# Patient Record
Sex: MALE | Race: WHITE | Employment: OTHER | URBAN - METROPOLITAN AREA
[De-identification: names, ages, dates, MRNs, and addresses within clinical notes are randomized per-mention and may not be internally consistent; named-entity substitution may affect disease eponyms.]

---

## 2017-01-27 PROBLEM — Z01.810 PREOPERATIVE CARDIOVASCULAR EXAMINATION: Status: ACTIVE | Noted: 2017-01-27

## 2019-01-01 ENCOUNTER — TELEPHONE (OUTPATIENT)
Dept: CARDIAC CATH/INVASIVE PROCEDURES | Age: 79
End: 2019-01-01

## 2019-01-01 ENCOUNTER — HOSPITAL ENCOUNTER (INPATIENT)
Age: 79
LOS: 1 days | Discharge: HOME HEALTH | DRG: 638 | End: 2019-12-23
Attending: STUDENT IN AN ORGANIZED HEALTH CARE EDUCATION/TRAINING PROGRAM | Admitting: INTERNAL MEDICINE
Payer: MEDICARE

## 2019-01-01 ENCOUNTER — APPOINTMENT (OUTPATIENT)
Dept: GENERAL RADIOLOGY | Age: 79
DRG: 638 | End: 2019-01-01
Attending: STUDENT IN AN ORGANIZED HEALTH CARE EDUCATION/TRAINING PROGRAM
Payer: MEDICARE

## 2019-01-01 VITALS
WEIGHT: 315 LBS | HEIGHT: 75 IN | BODY MASS INDEX: 39.17 KG/M2 | HEART RATE: 76 BPM | SYSTOLIC BLOOD PRESSURE: 186 MMHG | DIASTOLIC BLOOD PRESSURE: 80 MMHG | TEMPERATURE: 98.1 F | RESPIRATION RATE: 18 BRPM | OXYGEN SATURATION: 99 %

## 2019-01-01 DIAGNOSIS — E16.2 HYPOGLYCEMIA: Primary | ICD-10-CM

## 2019-01-01 DIAGNOSIS — N39.0 URINARY TRACT INFECTION WITHOUT HEMATURIA, SITE UNSPECIFIED: ICD-10-CM

## 2019-01-01 LAB
ACC. NO. FROM MICRO ORDER, ACCP: ABNORMAL
ALBUMIN SERPL-MCNC: 2.5 G/DL (ref 3.2–4.6)
ALBUMIN/GLOB SERPL: 0.6 {RATIO} (ref 1.2–3.5)
ALP SERPL-CCNC: 50 U/L (ref 50–136)
ALT SERPL-CCNC: 10 U/L (ref 12–65)
ANION GAP SERPL CALC-SCNC: 5 MMOL/L (ref 7–16)
ANION GAP SERPL CALC-SCNC: 6 MMOL/L (ref 7–16)
APPEARANCE UR: CLEAR
AST SERPL-CCNC: 14 U/L (ref 15–37)
ATRIAL RATE: 77 BPM
BACTERIA SPEC CULT: ABNORMAL
BACTERIA SPEC CULT: NORMAL
BACTERIA SPEC CULT: NORMAL
BACTERIA URNS QL MICRO: 0 /HPF
BASOPHILS # BLD: 0 K/UL (ref 0–0.2)
BASOPHILS # BLD: 0 K/UL (ref 0–0.2)
BASOPHILS NFR BLD: 0 % (ref 0–2)
BASOPHILS NFR BLD: 0 % (ref 0–2)
BILIRUB SERPL-MCNC: 0.3 MG/DL (ref 0.2–1.1)
BILIRUB UR QL: NEGATIVE
BNP SERPL-MCNC: 294 PG/ML
BUN SERPL-MCNC: 18 MG/DL (ref 8–23)
BUN SERPL-MCNC: 20 MG/DL (ref 8–23)
CALCIUM SERPL-MCNC: 8.3 MG/DL (ref 8.3–10.4)
CALCIUM SERPL-MCNC: 8.8 MG/DL (ref 8.3–10.4)
CALCULATED P AXIS, ECG09: 88 DEGREES
CALCULATED R AXIS, ECG10: -74 DEGREES
CALCULATED T AXIS, ECG11: 82 DEGREES
CASTS URNS QL MICRO: ABNORMAL /LPF
CHLORIDE SERPL-SCNC: 108 MMOL/L (ref 98–107)
CHLORIDE SERPL-SCNC: 109 MMOL/L (ref 98–107)
CLINICAL CONSIDERATION: ABNORMAL
CO2 SERPL-SCNC: 26 MMOL/L (ref 21–32)
CO2 SERPL-SCNC: 27 MMOL/L (ref 21–32)
COLOR UR: YELLOW
CREAT SERPL-MCNC: 1.25 MG/DL (ref 0.8–1.5)
CREAT SERPL-MCNC: 1.5 MG/DL (ref 0.8–1.5)
DIAGNOSIS, 93000: NORMAL
DIFFERENTIAL METHOD BLD: ABNORMAL
DIFFERENTIAL METHOD BLD: ABNORMAL
EOSINOPHIL # BLD: 0.1 K/UL (ref 0–0.8)
EOSINOPHIL # BLD: 0.1 K/UL (ref 0–0.8)
EOSINOPHIL NFR BLD: 1 % (ref 0.5–7.8)
EOSINOPHIL NFR BLD: 1 % (ref 0.5–7.8)
EPI CELLS #/AREA URNS HPF: 0 /HPF
ERYTHROCYTE [DISTWIDTH] IN BLOOD BY AUTOMATED COUNT: 18.1 % (ref 11.9–14.6)
ERYTHROCYTE [DISTWIDTH] IN BLOOD BY AUTOMATED COUNT: 18.1 % (ref 11.9–14.6)
EST. AVERAGE GLUCOSE BLD GHB EST-MCNC: 154 MG/DL
GLOBULIN SER CALC-MCNC: 4.2 G/DL (ref 2.3–3.5)
GLUCOSE BLD STRIP.AUTO-MCNC: 108 MG/DL (ref 65–100)
GLUCOSE BLD STRIP.AUTO-MCNC: 117 MG/DL (ref 65–100)
GLUCOSE BLD STRIP.AUTO-MCNC: 119 MG/DL (ref 65–100)
GLUCOSE BLD STRIP.AUTO-MCNC: 149 MG/DL (ref 65–100)
GLUCOSE BLD STRIP.AUTO-MCNC: 151 MG/DL (ref 65–100)
GLUCOSE BLD STRIP.AUTO-MCNC: 160 MG/DL (ref 65–100)
GLUCOSE BLD STRIP.AUTO-MCNC: 180 MG/DL (ref 65–100)
GLUCOSE BLD STRIP.AUTO-MCNC: 198 MG/DL (ref 65–100)
GLUCOSE BLD STRIP.AUTO-MCNC: 58 MG/DL (ref 65–100)
GLUCOSE BLD STRIP.AUTO-MCNC: 62 MG/DL (ref 65–100)
GLUCOSE BLD STRIP.AUTO-MCNC: 84 MG/DL (ref 65–100)
GLUCOSE BLD STRIP.AUTO-MCNC: 91 MG/DL (ref 65–100)
GLUCOSE SERPL-MCNC: 150 MG/DL (ref 65–100)
GLUCOSE SERPL-MCNC: 57 MG/DL (ref 65–100)
GLUCOSE UR STRIP.AUTO-MCNC: NEGATIVE MG/DL
GRAM STN SPEC: ABNORMAL
HBA1C MFR BLD: 7 % (ref 4.8–6)
HCT VFR BLD AUTO: 28.1 % (ref 41.1–50.3)
HCT VFR BLD AUTO: 30.8 % (ref 41.1–50.3)
HGB BLD-MCNC: 8.2 G/DL (ref 13.6–17.2)
HGB BLD-MCNC: 9.3 G/DL (ref 13.6–17.2)
HGB UR QL STRIP: ABNORMAL
IMM GRANULOCYTES # BLD AUTO: 0 K/UL (ref 0–0.5)
IMM GRANULOCYTES # BLD AUTO: 0.1 K/UL (ref 0–0.5)
IMM GRANULOCYTES NFR BLD AUTO: 0 % (ref 0–5)
IMM GRANULOCYTES NFR BLD AUTO: 1 % (ref 0–5)
INTERPRETATION: ABNORMAL
KETONES UR QL STRIP.AUTO: NEGATIVE MG/DL
LACTATE BLD-SCNC: 0.52 MMOL/L (ref 0.5–1.9)
LEUKOCYTE ESTERASE UR QL STRIP.AUTO: ABNORMAL
LYMPHOCYTES # BLD: 1.5 K/UL (ref 0.5–4.6)
LYMPHOCYTES # BLD: 1.5 K/UL (ref 0.5–4.6)
LYMPHOCYTES NFR BLD: 12 % (ref 13–44)
LYMPHOCYTES NFR BLD: 18 % (ref 13–44)
MCH RBC QN AUTO: 21.9 PG (ref 26.1–32.9)
MCH RBC QN AUTO: 22.8 PG (ref 26.1–32.9)
MCHC RBC AUTO-ENTMCNC: 29.2 G/DL (ref 31.4–35)
MCHC RBC AUTO-ENTMCNC: 30.2 G/DL (ref 31.4–35)
MCV RBC AUTO: 75.1 FL (ref 79.6–97.8)
MCV RBC AUTO: 75.5 FL (ref 79.6–97.8)
MECA (METHICILLIN-RESISTANCE GENES), MRGP: NOT DETECTED
MM INDURATION POC: 0 MM (ref 0–5)
MONOCYTES # BLD: 0.6 K/UL (ref 0.1–1.3)
MONOCYTES # BLD: 0.8 K/UL (ref 0.1–1.3)
MONOCYTES NFR BLD: 6 % (ref 4–12)
MONOCYTES NFR BLD: 7 % (ref 4–12)
NEUTS SEG # BLD: 10.2 K/UL (ref 1.7–8.2)
NEUTS SEG # BLD: 6.3 K/UL (ref 1.7–8.2)
NEUTS SEG NFR BLD: 73 % (ref 43–78)
NEUTS SEG NFR BLD: 80 % (ref 43–78)
NITRITE UR QL STRIP.AUTO: NEGATIVE
NRBC # BLD: 0 K/UL (ref 0–0.2)
NRBC # BLD: 0 K/UL (ref 0–0.2)
P-R INTERVAL, ECG05: 240 MS
PH UR STRIP: 6.5 [PH] (ref 5–9)
PLATELET # BLD AUTO: 255 K/UL (ref 150–450)
PLATELET # BLD AUTO: 290 K/UL (ref 150–450)
PMV BLD AUTO: 11 FL (ref 9.4–12.3)
PMV BLD AUTO: 11.5 FL (ref 9.4–12.3)
POTASSIUM SERPL-SCNC: 3.7 MMOL/L (ref 3.5–5.1)
POTASSIUM SERPL-SCNC: 4.1 MMOL/L (ref 3.5–5.1)
PPD POC: NORMAL
PROCALCITONIN SERPL-MCNC: <0.05 NG/ML
PROT SERPL-MCNC: 6.7 G/DL (ref 6.3–8.2)
PROT UR STRIP-MCNC: 100 MG/DL
Q-T INTERVAL, ECG07: 502 MS
QRS DURATION, ECG06: 216 MS
QTC CALCULATION (BEZET), ECG08: 568 MS
RBC # BLD AUTO: 3.74 M/UL (ref 4.23–5.6)
RBC # BLD AUTO: 4.08 M/UL (ref 4.23–5.6)
RBC #/AREA URNS HPF: ABNORMAL /HPF
SERVICE CMNT-IMP: ABNORMAL
SERVICE CMNT-IMP: NORMAL
SERVICE CMNT-IMP: NORMAL
SODIUM SERPL-SCNC: 140 MMOL/L (ref 136–145)
SODIUM SERPL-SCNC: 141 MMOL/L (ref 136–145)
SP GR UR REFRACTOMETRY: 1.01 (ref 1–1.02)
STAPHYLOCOCCUS, STAPP: DETECTED
TROPONIN I SERPL-MCNC: <0.02 NG/ML (ref 0.02–0.05)
UROBILINOGEN UR QL STRIP.AUTO: 0.2 EU/DL (ref 0.2–1)
VENTRICULAR RATE, ECG03: 77 BPM
WBC # BLD AUTO: 12.7 K/UL (ref 4.3–11.1)
WBC # BLD AUTO: 8.7 K/UL (ref 4.3–11.1)
WBC URNS QL MICRO: >100 /HPF

## 2019-01-01 PROCEDURE — 77030019605

## 2019-01-01 PROCEDURE — 85025 COMPLETE CBC W/AUTO DIFF WBC: CPT

## 2019-01-01 PROCEDURE — 96361 HYDRATE IV INFUSION ADD-ON: CPT | Performed by: STUDENT IN AN ORGANIZED HEALTH CARE EDUCATION/TRAINING PROGRAM

## 2019-01-01 PROCEDURE — 84145 PROCALCITONIN (PCT): CPT

## 2019-01-01 PROCEDURE — 74011000258 HC RX REV CODE- 258: Performed by: STUDENT IN AN ORGANIZED HEALTH CARE EDUCATION/TRAINING PROGRAM

## 2019-01-01 PROCEDURE — 81001 URINALYSIS AUTO W/SCOPE: CPT

## 2019-01-01 PROCEDURE — 74011250636 HC RX REV CODE- 250/636: Performed by: FAMILY MEDICINE

## 2019-01-01 PROCEDURE — 74011250637 HC RX REV CODE- 250/637: Performed by: INTERNAL MEDICINE

## 2019-01-01 PROCEDURE — 65270000029 HC RM PRIVATE

## 2019-01-01 PROCEDURE — 96367 TX/PROPH/DG ADDL SEQ IV INF: CPT

## 2019-01-01 PROCEDURE — 74011250636 HC RX REV CODE- 250/636: Performed by: STUDENT IN AN ORGANIZED HEALTH CARE EDUCATION/TRAINING PROGRAM

## 2019-01-01 PROCEDURE — 99218 HC RM OBSERVATION: CPT

## 2019-01-01 PROCEDURE — 74011000302 HC RX REV CODE- 302: Performed by: INTERNAL MEDICINE

## 2019-01-01 PROCEDURE — 71046 X-RAY EXAM CHEST 2 VIEWS: CPT

## 2019-01-01 PROCEDURE — 93005 ELECTROCARDIOGRAM TRACING: CPT | Performed by: STUDENT IN AN ORGANIZED HEALTH CARE EDUCATION/TRAINING PROGRAM

## 2019-01-01 PROCEDURE — 82962 GLUCOSE BLOOD TEST: CPT

## 2019-01-01 PROCEDURE — 97162 PT EVAL MOD COMPLEX 30 MIN: CPT

## 2019-01-01 PROCEDURE — 87150 DNA/RNA AMPLIFIED PROBE: CPT

## 2019-01-01 PROCEDURE — 80053 COMPREHEN METABOLIC PANEL: CPT

## 2019-01-01 PROCEDURE — 99285 EMERGENCY DEPT VISIT HI MDM: CPT | Performed by: STUDENT IN AN ORGANIZED HEALTH CARE EDUCATION/TRAINING PROGRAM

## 2019-01-01 PROCEDURE — 74011000250 HC RX REV CODE- 250

## 2019-01-01 PROCEDURE — 96365 THER/PROPH/DIAG IV INF INIT: CPT | Performed by: STUDENT IN AN ORGANIZED HEALTH CARE EDUCATION/TRAINING PROGRAM

## 2019-01-01 PROCEDURE — 86580 TB INTRADERMAL TEST: CPT | Performed by: INTERNAL MEDICINE

## 2019-01-01 PROCEDURE — 83605 ASSAY OF LACTIC ACID: CPT

## 2019-01-01 PROCEDURE — 87086 URINE CULTURE/COLONY COUNT: CPT

## 2019-01-01 PROCEDURE — 80048 BASIC METABOLIC PNL TOTAL CA: CPT

## 2019-01-01 PROCEDURE — 97161 PT EVAL LOW COMPLEX 20 MIN: CPT

## 2019-01-01 PROCEDURE — 87040 BLOOD CULTURE FOR BACTERIA: CPT

## 2019-01-01 PROCEDURE — 83880 ASSAY OF NATRIURETIC PEPTIDE: CPT

## 2019-01-01 PROCEDURE — 96375 TX/PRO/DX INJ NEW DRUG ADDON: CPT

## 2019-01-01 PROCEDURE — 74011000258 HC RX REV CODE- 258: Performed by: INTERNAL MEDICINE

## 2019-01-01 PROCEDURE — 74011250636 HC RX REV CODE- 250/636: Performed by: INTERNAL MEDICINE

## 2019-01-01 PROCEDURE — 84484 ASSAY OF TROPONIN QUANT: CPT

## 2019-01-01 PROCEDURE — 36415 COLL VENOUS BLD VENIPUNCTURE: CPT

## 2019-01-01 PROCEDURE — 83036 HEMOGLOBIN GLYCOSYLATED A1C: CPT

## 2019-01-01 PROCEDURE — 96366 THER/PROPH/DIAG IV INF ADDON: CPT

## 2019-01-01 PROCEDURE — 87205 SMEAR GRAM STAIN: CPT

## 2019-01-01 RX ORDER — VANCOMYCIN HYDROCHLORIDE 1 G/20ML
INJECTION, POWDER, LYOPHILIZED, FOR SOLUTION INTRAVENOUS EVERY 24 HOURS
Status: DISCONTINUED | OUTPATIENT
Start: 2019-01-01 | End: 2019-01-01 | Stop reason: DRUGHIGH

## 2019-01-01 RX ORDER — ATORVASTATIN CALCIUM 10 MG/1
10 TABLET, FILM COATED ORAL
Status: DISCONTINUED | OUTPATIENT
Start: 2019-01-01 | End: 2019-01-01 | Stop reason: HOSPADM

## 2019-01-01 RX ORDER — SOTALOL HYDROCHLORIDE 80 MG/1
120 TABLET ORAL EVERY 12 HOURS
Status: DISCONTINUED | OUTPATIENT
Start: 2019-01-01 | End: 2019-01-01 | Stop reason: HOSPADM

## 2019-01-01 RX ORDER — VANCOMYCIN 2 GRAM/500 ML IN 0.9 % SODIUM CHLORIDE INTRAVENOUS
2000
Status: DISCONTINUED | OUTPATIENT
Start: 2019-01-01 | End: 2019-01-01

## 2019-01-01 RX ORDER — DEXTROSE 50 % IN WATER (D50W) INTRAVENOUS SYRINGE
50
Status: COMPLETED | OUTPATIENT
Start: 2019-01-01 | End: 2019-01-01

## 2019-01-01 RX ORDER — ACETAMINOPHEN 325 MG/1
650 TABLET ORAL
Status: DISCONTINUED | OUTPATIENT
Start: 2019-01-01 | End: 2019-01-01 | Stop reason: HOSPADM

## 2019-01-01 RX ORDER — NALOXONE HYDROCHLORIDE 0.4 MG/ML
0.4 INJECTION, SOLUTION INTRAMUSCULAR; INTRAVENOUS; SUBCUTANEOUS AS NEEDED
Status: DISCONTINUED | OUTPATIENT
Start: 2019-01-01 | End: 2019-01-01 | Stop reason: HOSPADM

## 2019-01-01 RX ORDER — PROBENECID 500 MG/1
500 TABLET, FILM COATED ORAL 2 TIMES DAILY
Status: DISCONTINUED | OUTPATIENT
Start: 2019-01-01 | End: 2019-01-01 | Stop reason: HOSPADM

## 2019-01-01 RX ORDER — CEPHALEXIN 250 MG/1
250 CAPSULE ORAL 4 TIMES DAILY
Qty: 20 CAP | Refills: 0 | Status: SHIPPED | OUTPATIENT
Start: 2019-01-01

## 2019-01-01 RX ORDER — SODIUM CHLORIDE 0.9 % (FLUSH) 0.9 %
5-40 SYRINGE (ML) INJECTION AS NEEDED
Status: DISCONTINUED | OUTPATIENT
Start: 2019-01-01 | End: 2019-01-01 | Stop reason: HOSPADM

## 2019-01-01 RX ORDER — FUROSEMIDE 40 MG/1
40 TABLET ORAL DAILY
Status: DISCONTINUED | OUTPATIENT
Start: 2019-01-01 | End: 2019-01-01 | Stop reason: HOSPADM

## 2019-01-01 RX ORDER — ONDANSETRON 2 MG/ML
4 INJECTION INTRAMUSCULAR; INTRAVENOUS
Status: DISCONTINUED | OUTPATIENT
Start: 2019-01-01 | End: 2019-01-01 | Stop reason: HOSPADM

## 2019-01-01 RX ORDER — DEXTROSE 50 % IN WATER (D50W) INTRAVENOUS SYRINGE
Status: COMPLETED
Start: 2019-01-01 | End: 2019-01-01

## 2019-01-01 RX ORDER — ONDANSETRON 4 MG/1
4 TABLET, ORALLY DISINTEGRATING ORAL
Qty: 8 TAB | Refills: 2 | Status: SHIPPED | OUTPATIENT
Start: 2019-01-01

## 2019-01-01 RX ORDER — GUAIFENESIN 100 MG/5ML
81 LIQUID (ML) ORAL DAILY
Status: DISCONTINUED | OUTPATIENT
Start: 2019-01-01 | End: 2019-01-01 | Stop reason: HOSPADM

## 2019-01-01 RX ORDER — HYDROCODONE BITARTRATE AND ACETAMINOPHEN 7.5; 325 MG/1; MG/1
1 TABLET ORAL
Status: DISCONTINUED | OUTPATIENT
Start: 2019-01-01 | End: 2019-01-01 | Stop reason: HOSPADM

## 2019-01-01 RX ORDER — INSULIN LISPRO 100 [IU]/ML
12 INJECTION, SOLUTION INTRAVENOUS; SUBCUTANEOUS 2 TIMES DAILY WITH MEALS
COMMUNITY
End: 2019-01-01

## 2019-01-01 RX ORDER — CEFPODOXIME PROXETIL 100 MG/1
200 TABLET, FILM COATED ORAL 2 TIMES DAILY
Qty: 28 TAB | Refills: 0 | Status: SHIPPED | OUTPATIENT
Start: 2019-01-01 | End: 2019-01-01

## 2019-01-01 RX ORDER — ADHESIVE BANDAGE
30 BANDAGE TOPICAL DAILY PRN
Status: DISCONTINUED | OUTPATIENT
Start: 2019-01-01 | End: 2019-01-01 | Stop reason: HOSPADM

## 2019-01-01 RX ORDER — TAMSULOSIN HYDROCHLORIDE 0.4 MG/1
0.4 CAPSULE ORAL DAILY
Status: DISCONTINUED | OUTPATIENT
Start: 2019-01-01 | End: 2019-01-01 | Stop reason: HOSPADM

## 2019-01-01 RX ORDER — INSULIN LISPRO 100 [IU]/ML
INJECTION, SOLUTION INTRAVENOUS; SUBCUTANEOUS
Qty: 1 VIAL | Refills: 0 | Status: SHIPPED | OUTPATIENT
Start: 2019-01-01

## 2019-01-01 RX ORDER — CEPHALEXIN 250 MG/1
250 CAPSULE ORAL 4 TIMES DAILY
Status: DISCONTINUED | OUTPATIENT
Start: 2019-01-01 | End: 2019-01-01 | Stop reason: HOSPADM

## 2019-01-01 RX ORDER — SODIUM CHLORIDE 0.9 % (FLUSH) 0.9 %
5-40 SYRINGE (ML) INJECTION EVERY 8 HOURS
Status: DISCONTINUED | OUTPATIENT
Start: 2019-01-01 | End: 2019-01-01 | Stop reason: HOSPADM

## 2019-01-01 RX ORDER — GABAPENTIN 300 MG/1
300 CAPSULE ORAL 2 TIMES DAILY
Status: DISCONTINUED | OUTPATIENT
Start: 2019-01-01 | End: 2019-01-01 | Stop reason: HOSPADM

## 2019-01-01 RX ORDER — INSULIN LISPRO 100 [IU]/ML
INJECTION, SOLUTION INTRAVENOUS; SUBCUTANEOUS
Status: DISCONTINUED | OUTPATIENT
Start: 2019-01-01 | End: 2019-01-01 | Stop reason: HOSPADM

## 2019-01-01 RX ADMIN — ASPIRIN 81 MG 81 MG: 81 TABLET ORAL at 08:23

## 2019-01-01 RX ADMIN — TAMSULOSIN HYDROCHLORIDE 0.4 MG: 0.4 CAPSULE ORAL at 08:22

## 2019-01-01 RX ADMIN — FUROSEMIDE 40 MG: 40 TABLET ORAL at 08:49

## 2019-01-01 RX ADMIN — HYDROCODONE BITARTRATE AND ACETAMINOPHEN 1 TABLET: 7.5; 325 TABLET ORAL at 02:48

## 2019-01-01 RX ADMIN — GABAPENTIN 300 MG: 300 CAPSULE ORAL at 08:49

## 2019-01-01 RX ADMIN — Medication 10 ML: at 13:17

## 2019-01-01 RX ADMIN — CEPHALEXIN 250 MG: 250 CAPSULE ORAL at 14:19

## 2019-01-01 RX ADMIN — ASPIRIN 81 MG 81 MG: 81 TABLET ORAL at 08:49

## 2019-01-01 RX ADMIN — Medication 10 ML: at 05:29

## 2019-01-01 RX ADMIN — Medication 10 ML: at 06:00

## 2019-01-01 RX ADMIN — APIXABAN 5 MG: 5 TABLET, FILM COATED ORAL at 08:22

## 2019-01-01 RX ADMIN — Medication 10 ML: at 21:01

## 2019-01-01 RX ADMIN — VANCOMYCIN HYDROCHLORIDE 2500 MG: 10 INJECTION, POWDER, LYOPHILIZED, FOR SOLUTION INTRAVENOUS at 00:57

## 2019-01-01 RX ADMIN — GABAPENTIN 300 MG: 300 CAPSULE ORAL at 18:00

## 2019-01-01 RX ADMIN — TUBERCULIN PURIFIED PROTEIN DERIVATIVE 5 UNITS: 5 INJECTION, SOLUTION INTRADERMAL at 05:13

## 2019-01-01 RX ADMIN — GABAPENTIN 300 MG: 300 CAPSULE ORAL at 08:22

## 2019-01-01 RX ADMIN — DEXTROSE 50 % IN WATER (D50W) INTRAVENOUS SYRINGE 25 G: at 23:08

## 2019-01-01 RX ADMIN — APIXABAN 5 MG: 5 TABLET, FILM COATED ORAL at 20:46

## 2019-01-01 RX ADMIN — SOTALOL HYDROCHLORIDE 120 MG: 80 TABLET ORAL at 20:46

## 2019-01-01 RX ADMIN — HYDROCODONE BITARTRATE AND ACETAMINOPHEN 1 TABLET: 7.5; 325 TABLET ORAL at 08:49

## 2019-01-01 RX ADMIN — APIXABAN 5 MG: 5 TABLET, FILM COATED ORAL at 08:49

## 2019-01-01 RX ADMIN — HYDROCODONE BITARTRATE AND ACETAMINOPHEN 1 TABLET: 7.5; 325 TABLET ORAL at 17:19

## 2019-01-01 RX ADMIN — TAMSULOSIN HYDROCHLORIDE 0.4 MG: 0.4 CAPSULE ORAL at 08:49

## 2019-01-01 RX ADMIN — CEFTRIAXONE 1 G: 1 INJECTION, POWDER, FOR SOLUTION INTRAMUSCULAR; INTRAVENOUS at 23:06

## 2019-01-01 RX ADMIN — SODIUM CHLORIDE 1000 ML: 900 INJECTION, SOLUTION INTRAVENOUS at 22:42

## 2019-01-01 RX ADMIN — PROBENECID 500 MG: 500 TABLET, FILM COATED ORAL at 08:22

## 2019-01-01 RX ADMIN — ATORVASTATIN CALCIUM 10 MG: 10 TABLET, FILM COATED ORAL at 20:46

## 2019-01-01 RX ADMIN — SOTALOL HYDROCHLORIDE 120 MG: 80 TABLET ORAL at 08:21

## 2019-01-01 RX ADMIN — SOTALOL HYDROCHLORIDE 120 MG: 80 TABLET ORAL at 08:50

## 2019-01-01 RX ADMIN — PROBENECID 500 MG: 500 TABLET, FILM COATED ORAL at 08:49

## 2019-01-01 RX ADMIN — CEFTRIAXONE 1 G: 1 INJECTION, POWDER, FOR SOLUTION INTRAMUSCULAR; INTRAVENOUS at 00:14

## 2019-01-01 RX ADMIN — PROBENECID 500 MG: 500 TABLET, FILM COATED ORAL at 17:18

## 2019-01-01 RX ADMIN — HYDROCODONE BITARTRATE AND ACETAMINOPHEN 1 TABLET: 7.5; 325 TABLET ORAL at 09:16

## 2019-08-14 NOTE — TELEPHONE ENCOUNTER
Patient contacted to discuss watchman procedure. The watchman procedure and process explained in depth to patient.  His watchman consult appointment is with Dr. Belkis Govea on 8/19/19

## 2019-09-25 PROBLEM — Z01.810 PREOPERATIVE CARDIOVASCULAR EXAMINATION: Status: RESOLVED | Noted: 2017-01-27 | Resolved: 2019-01-01

## 2019-12-22 PROBLEM — E16.2 HYPOGLYCEMIA: Status: ACTIVE | Noted: 2019-01-01

## 2019-12-22 PROBLEM — R55 SYNCOPE: Status: ACTIVE | Noted: 2019-01-01

## 2019-12-22 NOTE — PROGRESS NOTES
Patient seen and examined, HPI from H&P as below. Near syncope very very likely due to his documented hypoglycemia. Without any insulin sugars are below 150. Diabetic diet. Diabetic educator consulted. UTI. Likely discharge in a.m. if telemetry stable. Will need home health for medication supervision. Diabetic educator consulted for recommendations and outpatient assistance if possible. He will need home health at discharge and possibly home PTwe will order PT and OT eval.  Home health needed for medical supervision and it sounds like he and his spouse need a home health  unclear if they are capable of independent living. Wife is postop from bleeding gastric ulcer by his report. Addendum he reports that 3 weeks ago had cataract surgery and was given a special drop compounded from out [facility] not from drugstorespecial ordered by his ophthalmologist.  He has weaned this down to once daily. He asked that I call his ocular surgeon. I asked him to contact wife for name from prescription bottle and if truly a nonformulary drug someone will have to bring it from home. He appears to understand. If a prescription eyedropwe may order here for him. Expected discharge tomorrow a.m. HPI from history and physical: The patient is a 68-year-old male past medical history of hypertension, atrial fibrillation, diabetes mellitus, hyperlipidemia, essential tremor, equilibrium issue, CAD (status post stenting), and permanent pacemaker who presents to the emergency department with an episode of near syncope. Patient reports he has been living at home alone because his wife was in the hospital and he has been having issues taking care of himself and feeding himself. He is also been having issues with his medication. He reports sometimes he is unsure if he is taking his insulin and might be taking it multiple times.   He reports wife returned yesterday and he has been trying to take care of her. He reports today earlier he had made her aldridge and was sitting on the couch. He reports while he was sitting on the couch he began to feel lightheaded and like he was going to faint for about 30 minutes. He reports he was unable to get himself off the couch. He reported he asked for his wife to call EMS. Apparently patient's blood sugar was 64 for EMS providers.  This is extremely low for patient per his report.  His normal blood sugars run at approximately 120.  He denies ongoing fever or chills.  There is no chest pain, shortness of breath tightness or pressure.  He denies abdominal pain and states he has been able to have normal bowel movements for the past 24 hours.  He denies any nausea or vomiting. Patient complains of increased urinary frequency. He denies dysuria or dark urine. He reports foul odor for few days.

## 2019-12-22 NOTE — H&P
Hospitalist Note Admit Date:  2019  9:59 PM  
Name:  Jonathan Sidhu Age:  70 y.o. 
:  1940 MRN:  [id number] PCP:  Kaushik Olivia MD 
Treatment Team: Attending Provider: Tiffany Carnes MD; Primary Nurse: Jigar Betts RN 
 
HPI/Subjective: The patient is a 68-year-old male past medical history of hypertension, atrial fibrillation, diabetes mellitus, hyperlipidemia, essential tremor, equilibrium issue, CAD (status post stenting), and permanent pacemaker who presents to the emergency department with an episode of near syncope. Patient reports he has been living at home alone because his wife was in the hospital and he has been having issues taking care of himself and feeding himself. He is also been having issues with his medication. He reports sometimes he is unsure if he is taking his insulin and might be taking it multiple times. He reports wife returned yesterday and he has been trying to take care of her. He reports today earlier he had made her aldridge and was sitting on the couch. He reports while he was sitting on the couch he began to feel lightheaded and like he was going to faint for about 30 minutes. He reports he was unable to get himself off the couch. He reported he asked for his wife to call EMS. Apparently patient's blood sugar was 64 for EMS providers. This is extremely low for patient per his report. His normal blood sugars run at approximately 120. He denies ongoing fever or chills. There is no chest pain, shortness of breath tightness or pressure. He denies abdominal pain and states he has been able to have normal bowel movements for the past 24 hours. He denies any nausea or vomiting. Patient complains of increased urinary frequency. He denies dysuria or dark urine. He reports foul odor for few days. 10 systems reviewed and negative except as noted in HPI. Past Medical History:  
Diagnosis Date  Arrhythmia Paroxysmal Atrial fibrillation  Arthritis   
 hip and knee  CAD (coronary artery disease)  Cardiac pacemaker 2015  Chronic diastolic heart failure ([facility] [address] 2015  Chronic pain   
 hips & knees  Diabetes ([facility] [address]  Diabetes mellitus type II, uncontrolled ([facility] [address] adult onset  Dyspnea/shortness of breath 2015  GERD (gastroesophageal reflux disease)  Heart failure ([facility] [address]  HTN - uncontrolled, malignant 2016  Hyperlipidemia 2015  Hypertension   
 malignant, uncontrolled  Malaise/fatigue/weakness/tiredness 2015  Mixed hyperlipidemia  Morbid obesity ([facility] [address]  Orthostatic hypotension 2015  Other ill-defined conditions(799.89) mixed hyperlipidemia  Paroxysmal atrial fibrillation (HCC)   
 spontaneously converted from Afib to sinus with sotalol, no eCV  Permanent atrial fibrillation ([facility] [address] 2015  Tendon injury R foot  Unspecified sleep apnea Past Surgical History:  
Procedure Laterality Date  HX HERNIA REPAIR    
 HX OTHER SURGICAL    
 hemorrhoidectomy  HX PACEMAKER    
 [place] LEFT HEART CATH,PERCUTANEOUS  3/31/2014 BMS to mid LAD Allergies Allergen Reactions  Demerol [Meperidine] Other (comments) Bradycardia  Pcn [Penicillins] Rash Social History Tobacco Use  Smoking status: Former Smoker Years: 8.00 Last attempt to quit: 3/25/1970 Years since quittin.7  Smokeless tobacco: Never Used Substance Use Topics  Alcohol use: No  
  
Family History Problem Relation Age of Onset  Diabetes Mother  Cancer Father  Diabetes Brother  Hypertension Brother There is no immunization history on file for this patient. PTA Medications: 
Prior to Admission Medications Prescriptions Last Dose Informant Patient Reported? Taking? ATORVASTATIN CALCIUM (ATORVASTATIN PO)   Yes No  
Sig: Take 10 mg by mouth nightly. HYDROcodone-acetaminophen (NORCO) 7.5-325 mg per tablet   Yes No  
Sig: Take 1 Tab by mouth. apixaban (ELIQUIS) 5 mg tablet   No No  
Sig: Take 1 Tab by mouth two (2) times a day. aspirin 81 mg chewable tablet   Yes No  
Sig: Take 81 mg by mouth daily. clotrimazole-betamethasone (LOTRISONE) topical cream   Yes No  
Sig: Apply  to affected area two (2) times a day. furosemide (LASIX) 40 mg tablet   Yes No  
Sig: Take 40 mg by mouth as needed. gabapentin (NEURONTIN) 300 mg capsule   Yes No  
Sig: Take 300 mg by mouth two (2) times a day. indomethacin (INDOCIN) 50 mg capsule   Yes No  
Sig: Take  by mouth as needed. insulin aspart (NOVOLOG FLEXPEN) 100 unit/mL flexpen   Yes No  
Sig: by SubCUTAneous route. insulin detemir (LEVEMIR) 100 unit/mL injection   Yes No  
Sig: by SubCUTAneous route nightly. omeprazole (PRILOSEC) 20 mg capsule   Yes No  
Sig: Take 20 mg by mouth.  
probenecid (BENEMID) 500 mg tablet   Yes No  
Sig: Take 500 mg by mouth two (2) times a day. sotalol (BETAPACE) 120 mg tablet   No No  
Sig: Take 1 Tab by mouth every twelve (12) hours. tamsulosin (FLOMAX) 0.4 mg capsule   Yes No  
Sig: Take 0.4 mg by mouth daily. traMADol (ULTRAM) 50 mg tablet   Yes No  
Sig: Take 50 mg by mouth every six (6) hours as needed for Pain. Facility-Administered Medications: None Objective:  
 
Patient Vitals for the past 24 hrs: 
 Temp Pulse Resp BP SpO2  
12/22/19 0255  77   96 % 12/22/19 0017  75 24 166/88 99 % 12/21/19 2325     96 % 12/21/19 2320  77 21 177/84   
12/21/19 2315  82 19 175/87   
12/21/19 2240  77 19 (!) 197/94 98 % 12/21/19 2201 97.4 °F (36.3 °C) 81 18 (!) 196/92 98 % Oxygen Therapy O2 Sat (%): 96 % (12/22/19 0255) Pulse via Oximetry: 77 beats per minute (12/22/19 0255) O2 Device: Room air (12/21/19 9550) Estimated body mass index is 41.25 kg/m² as calculated from the following: 
  Height as of this encounter: 6' 3\" (1.905 m). Weight as of this encounter: 149.7 kg (330 lb). No intake or output data in the 24 hours ending 12/22/19 0343 *Note that automatically entered I/Os may not be accurate; dependent on patient compliance with collection and accurate  by assistants. Physical Exam: 
General: Elderly male in no acute distress Eyes:nonicteric ENT: Moist mucous membranes Cardiovascular: RRR, no significant rubs or murmurs appreciated Respiratory: No wheezes, rales, or rhonchi; clear to auscultation bilaterally Gastrointestinal: Soft, nontender, nondistended, bowel sounds active Genitourinary: No suprapubic tenderness Musculoskeletal: No joint swelling appreciated Skin: No lesions on examined area Neurological: Alert and oriented, no focal deficits noted Psychiatric: Normal mood and affect I reviewed the labs, imaging, EKGs, telemetry, and other studies done this admission. Data Review:  
Recent Results (from the past 24 hour(s)) GLUCOSE, POC Collection Time: 12/21/19  9:59 PM  
Result Value Ref Range Glucose (POC) 62 (L) 65 - 100 mg/dL CBC WITH AUTOMATED DIFF Collection Time: 12/21/19 10:05 PM  
Result Value Ref Range WBC 12.7 (H) 4.3 - 11.1 K/uL  
 RBC 4.08 (L) 4.23 - 5.6 M/uL HGB 9.3 (L) 13.6 - 17.2 g/dL HCT 30.8 (L) 41.1 - 50.3 % MCV 75.5 (L) 79.6 - 97.8 FL  
 MCH 22.8 (L) 26.1 - 32.9 PG  
 MCHC 30.2 (L) 31.4 - 35.0 g/dL  
 RDW 18.1 (H) 11.9 - 14.6 % PLATELET 553 567 - 105 K/uL MPV 11.0 9.4 - 12.3 FL ABSOLUTE NRBC 0.00 0.0 - 0.2 K/uL  
 DF AUTOMATED NEUTROPHILS 80 (H) 43 - 78 % LYMPHOCYTES 12 (L) 13 - 44 % MONOCYTES 6 4.0 - 12.0 % EOSINOPHILS 1 0.5 - 7.8 % BASOPHILS 0 0.0 - 2.0 % IMMATURE GRANULOCYTES 0 0.0 - 5.0 %  
 ABS. NEUTROPHILS 10.2 (H) 1.7 - 8.2 K/UL  
 ABS. LYMPHOCYTES 1.5 0.5 - 4.6 K/UL  
 ABS. MONOCYTES 0.8 0.1 - 1.3 K/UL  
 ABS. EOSINOPHILS 0.1 0.0 - 0.8 K/UL  
 ABS. BASOPHILS 0.0 0.0 - 0.2 K/UL  
 ABS. IMM. GRANS. 0.1 0.0 - 0.5 K/UL METABOLIC PANEL, COMPREHENSIVE Collection Time: 12/21/19 10:05 PM  
Result Value Ref Range Sodium 140 136 - 145 mmol/L Potassium 3.7 3.5 - 5.1 mmol/L Chloride 108 (H) 98 - 107 mmol/L  
 CO2 26 21 - 32 mmol/L Anion gap 6 (L) 7 - 16 mmol/L Glucose 57 (L) 65 - 100 mg/dL BUN 20 8 - 23 MG/DL Creatinine 1.50 0.8 - 1.5 MG/DL  
 GFR est AA 58 (L) >60 ml/min/1.73m2 GFR est non-AA 48 (L) >60 ml/min/1.73m2 Calcium 8.8 8.3 - 10.4 MG/DL Bilirubin, total 0.3 0.2 - 1.1 MG/DL  
 ALT (SGPT) 10 (L) 12 - 65 U/L  
 AST (SGOT) 14 (L) 15 - 37 U/L Alk. phosphatase 50 50 - 136 U/L Protein, total 6.7 6.3 - 8.2 g/dL Albumin 2.5 (L) 3.2 - 4.6 g/dL Globulin 4.2 (H) 2.3 - 3.5 g/dL A-G Ratio 0.6 (L) 1.2 - 3.5 PROCALCITONIN Collection Time: 12/21/19 10:05 PM  
Result Value Ref Range Procalcitonin <0.05 ng/mL NT-PRO BNP Collection Time: 12/21/19 10:05 PM  
Result Value Ref Range NT pro- <450 PG/ML  
TROPONIN I Collection Time: 12/21/19 10:05 PM  
Result Value Ref Range Troponin-I, Qt. <0.02 (L) 0.02 - 0.05 NG/ML  
URINALYSIS W/ RFLX MICROSCOPIC Collection Time: 12/21/19 10:44 PM  
Result Value Ref Range Color YELLOW Appearance CLEAR Specific gravity 1.011 1.001 - 1.023    
 pH (UA) 6.5 5.0 - 9.0 Protein 100 (A) NEG mg/dL Glucose NEGATIVE  mg/dL Ketone NEGATIVE  NEG mg/dL Bilirubin NEGATIVE  NEG Blood TRACE (A) NEG Urobilinogen 0.2 0.2 - 1.0 EU/dL Nitrites NEGATIVE  NEG Leukocyte Esterase MODERATE (A) NEG    
 WBC >100 (H) 0 /hpf  
 RBC 0-3 0 /hpf Epithelial cells 0 0 /hpf Bacteria 0 0 /hpf Casts 0-3 0 /lpf POC LACTIC ACID Collection Time: 12/21/19 10:54 PM  
Result Value Ref Range Lactic Acid (POC) 0.52 0.5 - 1.9 mmol/L  
GLUCOSE, POC Collection Time: 12/21/19 11:05 PM  
Result Value Ref Range Glucose (POC) 58 (L) 65 - 100 mg/dL GLUCOSE, POC  Collection Time: 12/21/19 11:26 PM  
 Result Value Ref Range Glucose (POC) 151 (H) 65 - 100 mg/dL GLUCOSE, POC Collection Time: 12/22/19  1:17 AM  
Result Value Ref Range Glucose (POC) 84 65 - 100 mg/dL GLUCOSE, POC Collection Time: 12/22/19  2:14 AM  
Result Value Ref Range Glucose (POC) 91 65 - 100 mg/dL All Micro Results Procedure Component Value Units Date/Time Last [place]av[place] [[id number]] Collected:  12/21/19 2244 Order Status:  Completed Specimen:  Urine from Clean catch Updated:  12/22/19 4751 CULTURE, BLOOD [[id number]] Collected:  12/22/19 0024 Order Status:  Completed Specimen:  Blood Updated:  12/22/19 0030 CULTURE, BLOOD [[id number]] Collected:  12/22/19 0014 Order Status:  Completed Specimen:  Blood Updated:  12/22/19 0030 Current Facility-Administered Medications Medication Dose Route Frequency  [START ON 12/23/2019] cefTRIAXone (ROCEPHIN) 1 g in 0.9% sodium chloride (MBP/ADV) 50 mL  1 g IntraVENous Q24H Current Outpatient Medications Medication Sig  
 ondansetron (ZOFRAN ODT) 4 mg disintegrating tablet Take 1 Tab by mouth every eight (8) hours as needed for Nausea.  cefpodoxime (VANTIN) 100 mg tablet Take 2 Tabs by mouth two (2) times a day for 7 days.  omeprazole (PRILOSEC) 20 mg capsule Take 20 mg by mouth.  sotalol (BETAPACE) 120 mg tablet Take 1 Tab by mouth every twelve (12) hours.  apixaban (ELIQUIS) 5 mg tablet Take 1 Tab by mouth two (2) times a day.  HYDROcodone-acetaminophen (NORCO) 7.5-325 mg per tablet Take 1 Tab by mouth.  probenecid (BENEMID) 500 mg tablet Take 500 mg by mouth two (2) times a day.  indomethacin (INDOCIN) 50 mg capsule Take  by mouth as needed.  traMADol (ULTRAM) 50 mg tablet Take 50 mg by mouth every six (6) hours as needed for Pain.  clotrimazole-betamethasone (LOTRISONE) topical cream Apply  to affected area two (2) times a day.  aspirin 81 mg chewable tablet Take 81 mg by mouth daily.  gabapentin (NEURONTIN) 300 mg capsule Take 300 mg by mouth two (2) times a day.  tamsulosin (FLOMAX) 0.4 mg capsule Take 0.4 mg by mouth daily.  insulin aspart (NOVOLOG FLEXPEN) 100 unit/mL flexpen by SubCUTAneous route.  insulin detemir (LEVEMIR) 100 unit/mL injection by SubCUTAneous route nightly.  ATORVASTATIN CALCIUM (ATORVASTATIN PO) Take 10 mg by mouth nightly.  furosemide (LASIX) 40 mg tablet Take 40 mg by mouth as needed. Other Studies: Xr Chest Pa Lat Result Date: 12/22/2019 Frontal and lateral views of the chest COMPARISON: April 3, 2014 INDICATION: Chest pain FINDINGS: There are bilateral interstitial opacities, likely pulmonary edema. No pneumothorax. Heart is enlarged. Mediastinal contour is within normal limits. Left-sided cardiac pacer is stable. IMPRESSION: Cardiomegaly and pulmonary edema. Assessment and Plan:  
 
Hospital Problems as of 12/22/2019 Date Reviewed: 7/15/2019 Codes Class Noted - Resolved POA Hypoglycemia ICD-10-CM: E16.2 ICD-9-CM: 251.2  12/22/2019 - Present Unknown Plan: Hypoglycemia/diabetes mellitus 
-Patient's episode of near syncope most likely related to hypoglycemia -Hypoglycemia most likely secondary to incorrect insulin usage as patient is unsure if he has been taking it properly or using it multiple times as well as poor p.o. intake Plan: 
-POC glucose every 4 
-Hold all insulin 
-Continue diabetic diet 
-Telemetry monitoring for any possibility for underlying arrhythmia 
-Observation UTI/leukocytosis 
-Urinalysis: Positive for leukocyte esterase but negative for bacteria 
-Patient complains of increased urinary frequency 
-WBC 12.7 
-CXR: No consolidation seen 
-Blood cultures ordered 
-Procalcitonin negative Plan: 
-Urine culture 
-Rocephin 1 g daily 
-Follow-up blood cultures Hypertension 
-Continue home medications Atrial fibrillation Status post permanent pacemaker -Continue home medications CAD Status post stent placement 
-Continue home medications Discharge planning: Home versus rehab DVT ppx ordered: On Eliquis Code status:  Full Estimated LOS:  Greater than 2 midnights Risk:  high Signed: 
Henderson Lanes, MD

## 2019-12-22 NOTE — ED PROVIDER NOTES
Male patient presents via EMS with reports of near syncopal episode. Patient states he felt as though he may pass out for approximately 30 minutes prior to arrival.  Patient states he has had multiple episodes of similar nature over the past several days. He reports simply experiencing nausea, and profuse diarrhea last approximately 36 to 48 hours. This subsided earlier this month. Since onset of the symptoms and subsequent resolution, patient reports little p.o. fluid or food. He continues to take his insulin as prescribed and does not regularly check his blood sugar. Apparently patient's blood sugar was 64 for EMS providers. This is extremely low for patient per his report. His normal blood sugars run at approximately 120. She denies ongoing fever or chills. There is no chest pain, shortness of breath tightness or pressure. He denies abdominal pain and states he has been able to have normal bowel movements for the past 24 hours. He denies any nausea or vomiting. Past Medical History:  
Diagnosis Date  Arrhythmia Paroxysmal Atrial fibrillation  Arthritis   
 hip and knee  CAD (coronary artery disease)  Cardiac pacemaker 11/12/2015  Chronic diastolic heart failure ([address] 11/12/2015  Chronic pain   
 hips & knees  Diabetes ([address]  Diabetes mellitus type II, uncontrolled ([address] adult onset  Dyspnea/shortness of breath 11/12/2015  GERD (gastroesophageal reflux disease)  Heart failure ([address]  HTN - uncontrolled, malignant 8/8/2016  Hyperlipidemia 11/12/2015  Hypertension   
 malignant, uncontrolled  Malaise/fatigue/weakness/tiredness 11/12/2015  Mixed hyperlipidemia  Morbid obesity ([address]  Orthostatic hypotension 11/12/2015  Other ill-defined conditions(799.89) mixed hyperlipidemia  Paroxysmal atrial fibrillation (HCC)   
 spontaneously converted from Afib to sinus with sotalol, no eCV  
  Permanent atrial fibrillation ([facility] [address] 2015  Tendon injury R foot  Unspecified sleep apnea Past Surgical History:  
Procedure Laterality Date  HX HERNIA REPAIR    
 HX OTHER SURGICAL    
 hemorrhoidectomy  HX PACEMAKER    
 [place] LEFT HEART CATH,PERCUTANEOUS  3/31/2014 BMS to mid LAD Family History:  
Problem Relation Age of Onset  Diabetes Mother  Cancer Father  Diabetes Brother  Hypertension Brother Social History Socioeconomic History  Marital status:  Spouse name: Not on file  Number of children: Not on file  Years of education: Not on file  Highest education level: Not on file Occupational History  Not on file Social Needs  Financial resource strain: Not on file  Food insecurity:  
  Worry: Not on file Inability: Not on file  Transportation needs:  
  Medical: Not on file Non-medical: Not on file Tobacco Use  Smoking status: Former Smoker Years: 8.00 Last attempt to quit: 3/25/1970 Years since quittin.7  Smokeless tobacco: Never Used Substance and Sexual Activity  Alcohol use: No  
 Drug use: No  
 Sexual activity: Not on file Lifestyle  Physical activity:  
  Days per week: Not on file Minutes per session: Not on file  Stress: Not on file Relationships  Social connections:  
  Talks on phone: Not on file Gets together: Not on file Attends Gnosticist service: Not on file Active member of club or organization: Not on file Attends meetings of clubs or organizations: Not on file Relationship status: Not on file  Intimate partner violence:  
  Fear of current or ex partner: Not on file Emotionally abused: Not on file Physically abused: Not on file Forced sexual activity: Not on file Other Topics Concern  Not on file Social History Narrative  Not on file ALLERGIES: Demerol [meperidine] and Pcn [penicillins] Review of Systems Constitutional: Negative for chills, diaphoresis and fever. HENT: Negative for congestion, sneezing and sore throat. Eyes: Negative for visual disturbance. Respiratory: Negative for cough, chest tightness, shortness of breath and wheezing. Cardiovascular: Negative for chest pain and leg swelling. Gastrointestinal: Positive for diarrhea and nausea. Negative for abdominal pain, blood in stool and vomiting. Endocrine: Negative for polyuria. Genitourinary: Negative for difficulty urinating, dysuria, flank pain, hematuria and urgency. Musculoskeletal: Negative for back pain, myalgias, neck pain and neck stiffness. Skin: Negative for color change and rash. Neurological: Positive for weakness and light-headedness. Negative for dizziness, syncope, speech difficulty, numbness and headaches. Psychiatric/Behavioral: Negative for behavioral problems. All other systems reviewed and are negative. Vitals:  
 12/21/19 2201 12/21/19 2240 BP: (!) 196/92 (!) 197/94 Pulse: 81 77 Resp: 18 19 Temp: 97.4 °F (36.3 °C) SpO2: 98% 98% Weight: 149.7 kg (330 lb) Height: 6' 3\" (1.905 m) Physical Exam 
Vitals signs and nursing note reviewed. Constitutional:   
   General: He is not in acute distress. Appearance: He is well-developed. He is not diaphoretic. Comments: Disheveled appearing, morbidly obese, alert and oriented to person place and time. No acute distress, speaks in clear, fluid sentences. HENT:  
   Head: Normocephalic and atraumatic. Right Ear: External ear normal.  
   Left Ear: External ear normal.  
   Nose: Nose normal.  
Eyes:  
   Pupils: Pupils are equal, round, and reactive to light. Neck: Musculoskeletal: Normal range of motion. Cardiovascular:  
   Rate and Rhythm: Normal rate and regular rhythm. Heart sounds: Normal heart sounds. No murmur. No friction rub. No gallop. Pulmonary: Effort: Pulmonary effort is normal. No respiratory distress. Breath sounds: Normal breath sounds. No stridor. No decreased breath sounds, wheezing, rhonchi or rales. Comments: Diminished bilaterally secondary to body habitus. No focal findings. Chest:  
   Chest wall: No tenderness. Abdominal:  
   General: There is no distension. Palpations: Abdomen is soft. There is no mass. Tenderness: There is no tenderness. There is no guarding or rebound. Hernia: No hernia is present. Comments: No reproducible abdominal discomfort. No rebound guarding or distention. Obese abdomen Musculoskeletal: Normal range of motion. General: No tenderness or deformity. Skin: 
   General: Skin is warm and dry. Neurological:  
   Mental Status: He is alert and oriented to person, place, and time. Cranial Nerves: No cranial nerve deficit. MDM Number of Diagnoses or Management Options Diagnosis management comments: Initial blood sugar of 64 in triage. Patient received juice and peanut butter, on recheck blood sugar had dropped to 58. Remains weak and headache. Orders placed for D50. He is being hydrated as well. Slight leukocytosis of 12.7. Urinalysis shows evidence of UTI. Orders placed for blood cultures and IV Rocephin. EKG obtained on arrival shows a sinus rhythm with a rate of 77 beats a minute. Left bundle branch block present. No acute ischemic change. Amount and/or Complexity of Data Reviewed Clinical lab tests: ordered and reviewed Tests in the radiology section of CPT®: ordered and reviewed Tests in the medicine section of CPT®: ordered and reviewed Independent visualization of images, tracings, or specimens: yes Risk of Complications, Morbidity, and/or Mortality Presenting problems: moderate Diagnostic procedures: low Management options: moderate Patient Progress Patient progress: stable Procedures

## 2019-12-22 NOTE — PROGRESS NOTES
END OF SHIFT NOTE: 
 
INTAKE/OUTPUT No intake/output data recorded. Voiding: NO 
Catheter: NO 
Drain:   
 
 
 
 
 
Flatus: Patient does have flatus present. Stool:  0 occurrences. Characteristics: 
  
 
Emesis: 0 occurrences. Characteristics: VITAL SIGNS Patient Vitals for the past 12 hrs: 
 Temp Pulse Resp BP SpO2  
12/22/19 0446 97.2 °F (36.2 °C) 75 22 177/71 93 % 12/22/19 0255  77   96 % 12/22/19 0017  75 24 166/88 99 % 12/21/19 2325     96 % 12/21/19 2320  77 21 177/84   
12/21/19 2315  82 19 175/87   
12/21/19 2240  77 19 (!) 197/94 98 % 12/21/19 2201 97.4 °F (36.3 °C) 81 18 (!) 196/92 98 % Pain Assessment Pain Intensity 1: 5 (12/22/19 0446) Pain Location 1: Back Ambulating No 
 
Shift report given to oncoming nurse at the bedside.  
 
Eric Cheema RN

## 2019-12-22 NOTE — ED NOTES
TRANSFER - OUT REPORT: 
 
Verbal report given to Endy Ramírez RN(name) on Waldemar Leonard  being transferred to 203(unit) for routine progression of care Report consisted of patients Situation, Background, Assessment and  
Recommendations(SBAR). Information from the following report(s) SBAR and ED Summary was reviewed with the receiving nurse. Lines:  
Peripheral IV Left Hand (Active) Peripheral IV Left Antecubital (Active) Peripheral IV Right Hand (Active) Opportunity for questions and clarification was provided. Patient transported with: 
 [employer]

## 2019-12-22 NOTE — ED TRIAGE NOTES
Patient arrives to ED from home. Patient called out for feeling light headed today. Patient feels like he is going to pass out when he stands up. Patient had stomach flu x 1 week ago. Patient has felt tired ever since he has been sick. Patient has poor appetite. Patient diabetic. BGL 64. Denies n/v/d. VS- 
BP: 160/70 HR: 70 paced O2: 96% RA

## 2019-12-23 NOTE — PROGRESS NOTES
PHYSICAL THERAPY: Initial Assessment, Discharge and AM 12/23/2019 INPATIENT:   
[place] / Plan[place] PART A [place] B / Product Type: Medicare /   
  
NAME/AGE/GENDER: Alfreda Castle is a 70 y.o. male PRIMARY DIAGNOSIS: Hypoglycemia [E16.2] Hypoglycemia [E16.2] Syncope Syncope ICD-10: Treatment Diagnosis:  
 · Generalized Muscle Weakness (M62.81) · Difficulty in walking, Not elsewhere classified (R26.2) · History of falling (Z91.81) Precaution/Allergies: 
Demerol [meperidine] and Pcn [penicillins] ASSESSMENT:  
 
Mr. Kit Stoll is a 70year old WM with an admitting diagnosis of Hypoglycemia. His PMH includes hypertension, atrial fibrillation, diabetes mellitus, hyperlipidemia, essential tremor, equilibrium issue, CAD (status post stenting), and permanent pacemaker. He presents today sitting on the toilet with the CNA at the bedside. He required SBA/minimal assist with sit to stand from the toilet using the r/walker once up in standing. He was supervision to ambulated 10' with the r/walker over to the bed. He reports he lives with his wife in a 1 level home with 2 small step entry. His PLOF has been with use of his rollator (he has 2) and he has a lift chair and power scooter at home. He appears to have a good routine for functioning but if he gets sick or weak he has more difficulty and he reports falls. He manages his size and weight well when allowed to do things his way. His BLE AROM is WFLs although he state his knees have issues and buckle at times. Sit to stand was SBA with use of light bed elevation. Standing balance was good with use of the r/walker. He ambulated 25' with the r/walker with supervision and his gait was steady and controlled. His standing posture is compromised with his head flexion and positioned down to the left, which he reports has been long standing.   He returned to his bed and then to supine with moderate assist.  Mr. Rohit Hirsch was pleasant and cooperative with therapy and appears to be functioning at his baseline level with his mobility. This section established at most recent assessment PROBLEM LIST (Impairments causing functional limitations): 1. Decreased ADL/Functional Activities 2. Decreased Ambulation Ability/Technique 3. Increased Pain 4. Decreased Activity Tolerance INTERVENTIONS PLANNED: (Benefits and precautions of physical therapy have been discussed with the patient.) 1. No PT planned at this time as patient is being discharged and he is functioning at his baseline level TREATMENT PLAN: Frequency/Duration: N/A Rehabilitation Potential For Stated Goals: N/A  
 
REHAB RECOMMENDATIONS (at time of discharge pending progress):   
Placement: It is my opinion, based on this patient's performance to date, that Mr. Rohit Hirsch may benefit from [address] after discharge due to the functional deficits listed above that are likely to improve with skilled rehabilitation because he/she has multiple medical issues that affect his/her functional mobility in the community. Equipment:  
? None at this time HISTORY:  
History of Present Injury/Illness (Reason for Referral): The patient is a 68-year-old male past medical history of hypertension, atrial fibrillation, diabetes mellitus, hyperlipidemia, essential tremor, equilibrium issue, CAD (status post stenting), and permanent pacemaker who presents to the emergency department with an episode of near syncope. Patient reports he has been living at home alone because his wife was in the hospital and he has been having issues taking care of himself and feeding himself. He is also been having issues with his medication. He reports sometimes he is unsure if he is taking his insulin and might be taking it multiple times.   He reports wife returned yesterday and he has been trying to take care of her. He reports today earlier he had made her aldridge and was sitting on the couch. He reports while he was sitting on the couch he began to feel lightheaded and like he was going to faint for about 30 minutes. He reports he was unable to get himself off the couch. He reported he asked for his wife to call EMS. Apparently patient's blood sugar was 64 for EMS providers.  This is extremely low for patient per his report.  His normal blood sugars run at approximately 120.  He denies ongoing fever or chills.  There is no chest pain, shortness of breath tightness or pressure.  He denies abdominal pain and states he has been able to have normal bowel movements for the past 24 hours.  He denies any nausea or vomiting. Patient complains of increased urinary frequency. He denies dysuria or dark urine. He reports foul odor for few days.  
Past Medical History/Comorbidities:  
Mr. Rohit Hirsch  has a past medical history of Arrhythmia, Arthritis, CAD (coronary artery disease), Cardiac pacemaker (11/12/2015), Chronic diastolic heart failure ([address] (11/12/2015), Chronic pain, Diabetes ([address] Diabetes mellitus type II, uncontrolled ([address] Dyspnea/shortness of breath (11/12/2015), GERD (gastroesophageal reflux disease), Heart failure ([address] HTN - uncontrolled, malignant (8/8/2016), Hyperlipidemia (11/12/2015), Hypertension, Malaise/fatigue/weakness/tiredness (11/12/2015), Mixed hyperlipidemia, Morbid obesity ([address] Orthostatic hypotension (11/12/2015), Other ill-defined conditions(799.89), Paroxysmal atrial fibrillation ([address] Permanent atrial fibrillation ([address] (11/12/2015), Tendon injury, and Unspecified sleep apnea. Mr. Rohit Hrisch  has a past surgical history that includes hx other surgical; hx hernia repair; pr left heart cath,percutaneous (3/31/2014); and hx pacemaker. Social History/Living Environment:  
Home Environment: Private residence # Steps to Enter: 2 One/Two Story Residence: One story Living Alone: No 
Support Systems: Spouse/Significant Other/Partner, Friends \ neighbors Patient Expects to be Discharged to[de-identified] Private residence Current DME Used/Available at Home: Wheelchair, power, Walker, rollator, [place], straight, Grab bars, Lift chair Prior Level of Function/Work/Activity: 
Patient reports he has been functioning at home with use of his rollator and/or scooter Number of Personal Factors/Comorbidities that affect the Plan of Care: 3+: HIGH COMPLEXITY EXAMINATION:  
Most Recent Physical Functioning:  
Gross Assessment: 
AROM: Generally decreased, functional 
PROM: Generally decreased, functional 
Strength: Generally decreased, functional 
Tone: Normal 
Sensation: Intact Posture: 
Posture (WDL): Exceptions to [facility] Posture Assessment: Cervical, Asymmetry (comment), Forward head, Kyphosis, Trunk flexion Balance: 
Sitting: Intact Standing: Impaired Standing - Static: Good;Constant support Standing - Dynamic : Fair;Good;Constant support Bed Mobility: 
Supine to Sit: Moderate assistance Scooting: Minimum assistance(Sit to stand and side steps to scoot) Wheelchair Mobility: 
  
Transfers: 
Sit to Stand: Stand-by assistance;Minimum assistance(Depending on his seat height) Stand to Sit: Stand-by assistance Bed to Chair: Supervision Gait: 
  
Base of Support: Widened Speed/Edith: Slow Step Length: Left shortened;Right shortened Gait Abnormalities: Decreased step clearance;Trunk sway increased; Path deviations Distance (ft): 25 Feet (ft) Assistive Device: Walker, rolling Ambulation - Level of Assistance: Supervision Body Structures Involved: 1. Metabolic 2. Endocrine Body Functions Affected: 1. Movement Related 2. Metobolic/Endocrine Activities and Participation Affected: 1. Mobility Number of elements that affect the Plan of Care: 3: MODERATE COMPLEXITY CLINICAL PRESENTATION:  
 Presentation: Evolving clinical presentation with changing clinical characteristics: MODERATE COMPLEXITY CLINICAL DECISION MAKIN[address] [place] [id number] AM-PAC 6 Clicks Basic Mobility Inpatient Short Form How much difficulty does the patient currently have. .. Unable A Lot A Little None 1. Turning over in bed (including adjusting bedclothes, sheets and blankets)? [] 1   [] 2   [x] 3   [] 4  
2. Sitting down on and standing up from a chair with arms ( e.g., wheelchair, bedside commode, etc.)   [] 1   [] 2   [x] 3   [] 4  
3. Moving from lying on back to sitting on the side of the bed? [] 1   [] 2   [x] 3   [] 4 How much help from another person does the patient currently need. .. Total A Lot A Little None 4. Moving to and from a bed to a chair (including a wheelchair)? [] 1   [] 2   [x] 3   [] 4  
5. Need to walk in hospital room? [] 1   [] 2   [x] 3   [] 4  
6. Climbing 3-5 steps with a railing? [] 1   [x] 2   [] 3   [] 4  
© , Trustees of [address] [place] [address] [address] [id number], under license to [employer]. All rights reserved Score:  Initial: 17 Most Recent: X (Date: -- ) Interpretation of Tool:  Represents activities that are increasingly more difficult (i.e. Bed mobility, Transfers, Gait). Medical Necessity: · No skilled intervention on this admission as patient is being discharged and is at his baselevel. Reason for Services/Other Comments: 
· Patient has been observed to be functioning at his base level before, during or after an intervention. Use of outcome tool(s) and clinical judgement create a POC that gives a: Questionable prediction of patient's progress: MODERATE COMPLEXITY  
  
 
 
 
TREATMENT:  
(In addition to Assessment/Re-Assessment sessions the following treatments were rendered) Pre-treatment Symptoms/Complaints:  Patient reports back and BLE knee pain but had pain medication and he states his pain is under control Pain: Initial: Pain Intensity 1: 1  Post Session:  1/10 Assessment/Reassessment only, no treatment provided today Braces/Orthotics/Lines/Etc:  
· O2 Device: Room air Treatment/Session Assessment:   
· Response to Treatment:  Patient participated and tolerated therapy well. He was pleasant and likes to talk a lot. · Interdisciplinary Collaboration:  
o Physical Therapist 
o Registered Nurse 
o  · After treatment position/precautions:  
o Bed in low position 
o Call light within reach 
o RN notified 
o Sitting up in the bed · Compliance with Program/Exercises: N/A 
· Recommendations/Intent for next treatment session:  N/A Total Treatment Duration: PT Patient Time In/Time Out Time In: 7690 Time Out: [address] [place]

## 2019-12-23 NOTE — PROGRESS NOTES
Very nice man to visit [employer] He shared his david with the  Has been a [employer] since the age of 10 Encouraged each other Prayer offered Ernst Pittman, staff [address], [phone] [facility]  /   [email or web address]

## 2019-12-23 NOTE — PROGRESS NOTES
END OF SHIFT NOTE: 
 
INTAKE/OUTPUT 
12/22 0701 - 12/23 0700 In: -  
Out: 925 [Urine:925] Voiding: YES Catheter: NO 
Drain:   
 
 
 
 
 
Flatus: Patient does have flatus present. Stool:  0 occurrences. Characteristics: 
  
 
Emesis: 0 occurrences. Characteristics: VITAL SIGNS Patient Vitals for the past 12 hrs: 
 Temp Pulse Resp BP SpO2  
12/23/19 0357 98.2 °F (36.8 °C) 77 18 152/76 92 % 12/22/19 2306 98.6 °F (37 °C) 76 18 130/67 93 % 12/22/19 2028  75     
12/22/19 1932 97.9 °F (36.6 °C) 76 17 144/78 96 % Pain Assessment Pain Intensity 1: 0 (12/23/19 0341) Pain Location 1: Back, Knee Pain Intervention(s) 1: Medication (see MAR) Patient Stated Pain Goal: 0 Ambulating No 
 
Shift report given to oncoming nurse at the bedside.  
 
Tom Borja RN

## 2019-12-23 NOTE — DISCHARGE SUMMARY
Hospitalist Discharge Summary Admit Date:  2019  9:59 PM  
Name:  Lila Klein Age:  70 y.o. 
:  1940 MRN:  [id number] PCP:  Vish Henry MD 
Treatment Team: Attending Provider: Joseline Vasquez DO; Primary Nurse: Pooja Hernandez, RN; Primary Nurse: Gisell Quintana RN; Care Manager: Srinath Rodriguez, RN; Staff Nurse: Owen Lentz RN 
 
Problem List for this Hospitalization: 
Hospital Problems as of 2019 Date Reviewed: 7/15/2019 Codes Class Noted - Resolved POA Hypoglycemia ICD-10-CM: E16.2 ICD-9-CM: 251.2  2019 - Present Unknown * (Principal) Syncope ICD-10-CM: R55 
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   
\"   
The patient is a 68-year-old male past medical history of hypertension, atrial fibrillation, diabetes mellitus, hyperlipidemia, essential tremor, equilibrium issue, CAD (status post stenting), and permanent pacemaker who presents to the emergency department with an episode of near syncope.  Patient reports he has been living at home alone because his wife was in the hospital and he has been having issues taking care of himself and feeding himself. Malu Lozano is also been having issues with his medication. Malu Lozano reports sometimes he is unsure if he is taking his insulin and might be taking it multiple times. Malu Lozano reports wife returned yesterday and he has been trying to take care of her. Malu Lozano reports today earlier he had made her aldridge and was sitting on the couch.  He reports while he was sitting on the couch he began to feel lightheaded and like he was going to faint for about 30 minutes.  He reports he was unable to get himself off the couch.  He reported he asked for his wife to call EMS. Apparently patient's blood sugar was 64 for EMS providers.  This is extremely low for patient per his report.  His normal blood sugars run at approximately 120.  He denies ongoing fever or chills.  There is no chest pain, shortness of breath tightness or pressure.  He denies abdominal pain and states he has been able to have normal bowel movements for the past 24 hours.  He denies any nausea or vomiting.  Patient complains of increased urinary frequency.  He denies dysuria or dark urine.  He reports foul odor for few days. [facility] Course: 
 
Patient had near syncope correlated with documented hypoglycemiaadmits to Not monitoring sugars and using insulin. On no insulin first 24 hours of hospitalization sugars remained below 200. He was not eating well as he presented with nausea vomiting like related to urinary tract infection. Culture and sensitivity pending at time of discharge but tolerating cephalosporin and he received several dosages of intravenous Rocephin and clinically is improved. Nausea vomiting resolved. He saw diabetic educator and discussed frequent monitoring of blood sugars. Hemoglobin A1c was sent. It sounds like he and his wife could both use some assistance in the home and he does have some debility and postural instability and we discussed home health with home physical and occupational therapy and possible  in the home. His sugars are stable urinary infection is being treated nausea and vomiting resolved and he may be discharged home from observation in stable condition. He claims that he has a follow-up visit with his primary care doctor tomorrow.   We discussed sliding scale only initially and then when sugars consistently are above 200 he may resume long-acting insulin but only 10-12 units nocturnally. Follow up instructions and discharge meds at bottom of this note. Plan was discussed with patient and staff. All questions answered. Patient was stable at time of discharge. 10 systems reviewed and negative except as noted in HPI. Diagnostic Imaging/Tests:  
Xr Chest Pa Lat Result Date: 12/22/2019 Frontal and lateral views of the chest COMPARISON: April 3, 2014 INDICATION: Chest pain FINDINGS: There are bilateral interstitial opacities, likely pulmonary edema. No pneumothorax. Heart is enlarged. Mediastinal contour is within normal limits. Left-sided cardiac pacer is stable. IMPRESSION: Cardiomegaly and pulmonary edema. Echocardiogram results: No results found for this visit on 12/21/19. All Micro Results Procedure Component Value Units Date/Time CULTURE, BLOOD [[id number]] Collected:  12/22/19 0014 Order Status:  Completed Specimen:  Blood Updated:  12/23/19 0900 Special Requests: --     
  LEFT Antecubital 
  
  Culture result: NO GROWTH 1 DAY     
 CULTURE, URINE [[id number]] Collected:  12/21/19 2244 Order Status:  Completed Specimen:  Urine from Clean catch Updated:  12/23/19 9653 Special Requests: NO SPECIAL REQUESTS Culture result:    
  >100,000 COLONIES/mL MIXED SKIN JENNIFER ISOLATED  
     
 CULTURE, BLOOD [[id number]] Collected:  12/22/19 0024 Order Status:  Completed Specimen:  Blood Updated:  12/23/19 0835 Special Requests: --     
  RIGHT 
HAND 
  
  GRAM STAIN GRAM POSITIVE COCCI AEROBIC BOTTLE POSITIVE RESULTS VERIFIED, PHONED TO AND READ BACK BY LAMBERTO FUENTES RN @ 8938 ON [id number] BY TB  
     
  Culture result:    
  CULTURE IN PROGRESS,FURTHER UPDATES TO FOLLOW REFER TO BIOFIRE PANEL [address] [id number] BLOOD CULTURE ID PANEL [[id number]]  (Abnormal) Collected:  12/22/19 0024 Order Status:  Completed Specimen:  Blood Updated:  12/23/19 8824 Acc. no. from [place] [id number] Staphylococcus DETECTED Comment: RESULTS VERIFIED, PHONED TO AND READ BACK BY 
Dl An RN ON 12/23/19 @2007, ADS 
  
  
  mecA (Methicillin-Resistance Genes) NOT DETECTED INTERPRETATION Gram positive cocci in clusters. Identified by realtime PCR as  Coagulase negative Staphylococci Clinical Consideration A single positive culture of coagulase negative Staph is likely to be a contaminant in adult patients. Consider discontinuation of antibiotics for gram positive bloodstream infections if patient asymptomatic. THIS TEST DOES NOT REPLACE SENSITIVITY TESTING. Labs: Results:  
   
BMP, Mg, Phos Recent Labs  
  12/23/19 0458 12/21/19 2205  140  
K 4.1 3.7 * 108* CO2 27 26 AGAP 5* 6*  
BUN 18 20 CREA 1.25 1.50 CA 8.3 8.8 * 57* CBC Recent Labs  
  12/23/19 0458 12/21/19 2205 WBC 8.7 12.7*  
RBC 3.74* 4.08* HGB 8.2* 9.3* HCT 28.1* 30.8*  290 GRANS 73 80* LYMPH 18 12* EOS 1 1 MONOS 7 6 BASOS 0 0 IG 1 0 ANEU 6.3 10.2* ABL 1.5 1.5 SILVERIO 0.1 0.1 ABM 0.6 0.8 ABB 0.0 0.0 AIG 0.0 0.1 LFT Recent Labs  
  12/21/19 2205 SGOT 14* ALT 10* AP 50  
TP 6.7 ALB 2.5*  
GLOB 4.2* AGRAT 0.6* Cardiac Testing Lab Results Component Value Date/Time  05/07/2014 11:34 AM  
 BNP 92 04/03/2014 05:28 AM  
 BNP 83 04/02/2014 04:55 AM  
 Troponin-I, Qt. <0.02 (L) 12/21/2019 10:05 PM  
 Troponin-I, Qt. <0.04 07/14/2015 09:22 PM  
  
Coagulation Tests Lab Results Component Value Date/Time  Prothrombin time 11.0 07/14/2015 09:22 PM  
 Prothrombin time 12.7 (H) 03/28/2014 08:40 AM  
 Prothrombin time 16.9 (H) 03/24/2014 05:11 PM  
 INR 1.0 07/14/2015 09:22 PM  
 INR 1.2 03/28/2014 08:40 AM  
 INR 1.6 (H) 03/24/2014 05:11 PM  
 aPTT 34.1 (H) 07/14/2015 09:22 PM  
 aPTT 39.7 (H) 03/31/2014 07:55 AM  
 aPTT 47.2 (H) 03/31/2014 01:34 AM  
  
A1c No results found for: HBA1C, HGBE8, EMF3BPNI, KKO7NHOT Lipid Panel Lab Results Component Value Date/Time Cholesterol, total 132 04/01/2014 05:15 AM  
 HDL Cholesterol 31 (L) 04/01/2014 05:15 AM  
 LDL, calculated 77.4 04/01/2014 05:15 AM  
 VLDL, calculated 23.6 (H) 04/01/2014 05:15 AM  
 Triglyceride 118 04/01/2014 05:15 AM  
 CHOL/HDL Ratio 4.3 04/01/2014 05:15 AM  
  
Thyroid Panel No results found for: TSH, T4, FT4, TT3, T3U, TSHEXT, TSHEXT Most Recent UA Lab Results Component Value Date/Time Color YELLOW 12/21/2019 10:44 PM  
 Appearance CLEAR 12/21/2019 10:44 PM  
 Specific gravity 1.011 12/21/2019 10:44 PM  
 pH (UA) 6.5 12/21/2019 10:44 PM  
 Protein 100 (A) 12/21/2019 10:44 PM  
 Glucose NEGATIVE  12/21/2019 10:44 PM  
 Ketone NEGATIVE  12/21/2019 10:44 PM  
 Bilirubin NEGATIVE  12/21/2019 10:44 PM  
 Blood TRACE (A) 12/21/2019 10:44 PM  
 Urobilinogen 0.2 12/21/2019 10:44 PM  
 Nitrites NEGATIVE  12/21/2019 10:44 PM  
 Leukocyte Esterase MODERATE (A) 12/21/2019 10:44 PM  
  
 
Allergies Allergen Reactions  Demerol [Meperidine] Other (comments) Bradycardia  Pcn [Penicillins] Rash Immunization History Administered Date(s) Administered  TB Skin Test (PPD) Intradermal 12/22/2019 All Labs from Last 24 Hrs: 
Recent Results (from the past 24 hour(s)) GLUCOSE, POC Collection Time: 12/22/19 12:30 PM  
Result Value Ref Range Glucose (POC) 119 (H) 65 - 100 mg/dL GLUCOSE, POC Collection Time: 12/22/19  5:08 PM  
Result Value Ref Range Glucose (POC) 160 (H) 65 - 100 mg/dL GLUCOSE, POC Collection Time: 12/22/19  9:55 PM  
Result Value Ref Range Glucose (POC) 198 (H) 65 - 100 mg/dL CBC WITH AUTOMATED DIFF Collection Time: 12/23/19  4:58 AM  
Result Value Ref Range WBC 8.7 4.3 - 11.1 K/uL  
 RBC 3.74 (L) 4.23 - 5.6 M/uL HGB 8.2 (L) 13.6 - 17.2 g/dL HCT 28.1 (L) 41.1 - 50.3 % MCV 75.1 (L) 79.6 - 97.8 FL  
 MCH 21.9 (L) 26.1 - 32.9 PG  
 MCHC 29.2 (L) 31.4 - 35.0 g/dL  
 RDW 18.1 (H) 11.9 - 14.6 % PLATELET 999 225 - 858 K/uL MPV 11.5 9.4 - 12.3 FL ABSOLUTE NRBC 0.00 0.0 - 0.2 K/uL  
 DF AUTOMATED NEUTROPHILS 73 43 - 78 % LYMPHOCYTES 18 13 - 44 % MONOCYTES 7 4.0 - 12.0 % EOSINOPHILS 1 0.5 - 7.8 % BASOPHILS 0 0.0 - 2.0 % IMMATURE GRANULOCYTES 1 0.0 - 5.0 %  
 ABS. NEUTROPHILS 6.3 1.7 - 8.2 K/UL  
 ABS. LYMPHOCYTES 1.5 0.5 - 4.6 K/UL  
 ABS. MONOCYTES 0.6 0.1 - 1.3 K/UL  
 ABS. EOSINOPHILS 0.1 0.0 - 0.8 K/UL  
 ABS. BASOPHILS 0.0 0.0 - 0.2 K/UL  
 ABS. IMM. GRANS. 0.0 0.0 - 0.5 K/UL METABOLIC PANEL, BASIC Collection Time: 12/23/19  4:58 AM  
Result Value Ref Range Sodium 141 136 - 145 mmol/L Potassium 4.1 3.5 - 5.1 mmol/L Chloride 109 (H) 98 - 107 mmol/L  
 CO2 27 21 - 32 mmol/L Anion gap 5 (L) 7 - 16 mmol/L Glucose 150 (H) 65 - 100 mg/dL BUN 18 8 - 23 MG/DL Creatinine 1.25 0.8 - 1.5 MG/DL  
 GFR est AA >60 >60 ml/min/1.73m2 GFR est non-AA 60 (L) >60 ml/min/1.73m2 Calcium 8.3 8.3 - 10.4 MG/DL  
PLEASE READ & DOCUMENT PPD TEST IN 24 HRS Collection Time: 12/23/19  5:13 AM  
Result Value Ref Range PPD    
 mm Induration 0 0 - 5 mm GLUCOSE, POC Collection Time: 12/23/19  7:39 AM  
Result Value Ref Range Glucose (POC) 149 (H) 65 - 100 mg/dL GLUCOSE, POC Collection Time: 12/23/19 11:43 AM  
Result Value Ref Range Glucose (POC) 180 (H) 65 - 100 mg/dL Discharge Exam: 
Patient Vitals for the past 24 hrs: 
 Temp Pulse Resp BP SpO2  
12/23/19 1136 98.1 °F (36.7 °C) 76 18 186/80 99 % 12/23/19 0834     95 % 12/23/19 0800  77     
12/23/19 0728 98 °F (36.7 °C) 79 18 150/80 95 % 12/23/19 0357 98.2 °F (36.8 °C) 77 18 152/76 92 % 12/22/19 2306 98.6 °F (37 °C) 76 18 130/67 93 % 12/22/19 2028  75     
12/22/19 1932 97.9 °F (36.6 °C) 76 17 144/78 96 % 12/22/19 1531 98.1 °F (36.7 °C) 61 20 119/77 94 % Oxygen Therapy O2 Sat (%): 99 % (12/23/19 1136) Pulse via Oximetry: 95 beats per minute (12/23/19 0834) O2 Device: Room air (12/23/19 0834) Intake/Output Summary (Last 24 hours) at 12/23/2019 1209 Last data filed at 12/23/2019 0140 Gross per 24 hour Intake  Output 875 ml Net -875 ml Physical exam: 
General:    Well nourished. Alert. No distress. Eyes:   Normal sclera. Extraocular movements intact. ENT:  Normocephalic, atraumatic. Moist mucous membranes CV:   Regular rate and rhythm. No murmur, rub, or gallop. Lungs:  Clear to auscultation bilaterally. No wheezing, rhonchi, or rales. Abdomen: Soft, nontender, nondistended. Bowel sounds normal.  
Extremities: Warm and dry. No cyanosis or edema. Neurologic: No focal deficits Skin:     No rashes or jaundice. Psych:  Normal mood and affect. Discharge Info:  
Current Discharge Medication List  
  
START taking these medications Details  
cephALEXin (KEFLEX) 250 mg capsule Take 1 Cap by mouth four (4) times daily. Qty: 20 Cap, Refills: 0  
  
insulin lispro (HUMALOG) 100 unit/mL injection INITIATE INSULIN CORRECTIVE PROTOCOL: 
INITIATE INSULIN CORRECTIVE PROTOCOL: 
Normal Insulin Sensitivity For Blood Sugar (mg/dL) of:    
Less than 150 =   0 units 150 -199 =   2 units 200 -249 =   4 units 250 -299 =   6 units 300 -349 =   8 units 350 and above = 10 units and Call Physician Initiate Hypoglycemia protocol if blood glucose is <70 mg/dL Fast Acting - Administer Immediately - or within 15 minutes of start of meal, if mealtime coverage. Qty: 1 Vial, Refills: 0  
  
ondansetron (ZOFRAN ODT) 4 mg disintegrating tablet Take 1 Tab by mouth every eight (8) hours as needed for Nausea. Qty: 8 Tab, Refills: 2 CONTINUE these medications which have CHANGED  Details  
insulin detemir U-100 (LEVEMIR U-100 INSULIN) 100 unit/mL injection 6 Units by SubCUTAneous route nightly. Qty: 1 Vial, Refills: 0 CONTINUE these medications which have NOT CHANGED Details  
omeprazole (PRILOSEC) 20 mg capsule Take 20 mg by mouth.  
  
sotalol (BETAPACE) 120 mg tablet Take 1 Tab by mouth every twelve (12) hours. Qty: 180 Tab, Refills: 3  
  
apixaban (ELIQUIS) 5 mg tablet Take 1 Tab by mouth two (2) times a day. Qty: 180 Tab, Refills: 3 HYDROcodone-acetaminophen (NORCO) 7.5-325 mg per tablet Take 1 Tab by mouth.  
  
probenecid (BENEMID) 500 mg tablet Take 500 mg by mouth two (2) times a day. indomethacin (INDOCIN) 50 mg capsule Take  by mouth as needed. clotrimazole-betamethasone (LOTRISONE) topical cream Apply  to affected area two (2) times a day. aspirin 81 mg chewable tablet Take 81 mg by mouth daily. gabapentin (NEURONTIN) 300 mg capsule Take 300 mg by mouth two (2) times a day. tamsulosin (FLOMAX) 0.4 mg capsule Take 0.4 mg by mouth daily. ATORVASTATIN CALCIUM (ATORVASTATIN PO) Take 10 mg by mouth nightly. traMADol (ULTRAM) 50 mg tablet Take 50 mg by mouth every six (6) hours as needed for Pain. furosemide (LASIX) 40 mg tablet Take 40 mg by mouth as needed. STOP taking these medications  
  
 insulin lispro (HUMALOG) 100 unit/mL kwikpen Comments:  
Reason for Stopping:   
   
  
 
 
 
Disposition: home with home health Activity: No driving until cleared by primary care Diet: DIET DIABETIC CONSISTENT CARB Regular Follow-up Appointments Procedures  FOLLOW UP VISIT Appointment in: 3 - 5 Days Follow-up with primary care as soon as possible. Need to discuss attenuated insulin regimen and close monitoring. No driving until hypoglycemia resolved Follow-up with primary care as soon as possible. Need to discuss attenuated insulin regimen and close monitoring. No driving until hypoglycemia resolved Standing Status:   Standing Number of Occurrences:   1 Order Specific Question:   Appointment in Answer:   3 - 5 Days Follow-up Information Follow up With Specialties Details Why Contact Info Nina Zuluaga MD Internal Medicine   [address] [facility] Internal Medicine  
[place] [address] 
[phone] Time spent in patient discharge planning and coordination 44minutes. Signed: 
Lizbeth Strauss

## 2019-12-23 NOTE — PROGRESS NOTES
12/23/19 0250 Dual Skin Pressure Injury Assessment Dual Skin Pressure Injury Assessment [facility] Second Care Provider (Based on [address] [place] [address]) Jill Ochoa RN

## 2019-12-23 NOTE — PROGRESS NOTES
Called for gram-positive cocci in 1/2 blood culture bottles. Patient admitted with syncope and UTI. Started vancomycin for coverage of enterococcus. Repeat blood cultures scheduled for 24 hours from now.   Will defer echocardiogram and infectious disease consult to day team.

## 2019-12-23 NOTE — DIABETES MGMT
Patient admitted with syncope. Admitting blood glucose 57. Blood glucose range yesterday  with pt receiving no diabetic medications. This AM blood glucose 149. Hgb 8.2. Creatinine 1.25. GFR >60. Pt in bed this AM, no visitors at bedside. Pt states he was diagnosed with diabetes \"more than 20 years ago. \" Pt states he has a family history of diabetes. Pt states he has also had formal diabetes education in the past. Pt states he typically takes Levemir 44 units BID and Humalog 12 units with breakfast and dinner. When questioning pt regarding fingerstick blood glucose checks, pt states \"I used to check it 4-5 times a day, but then Dr. Kurt Rubi said since my A1c has stayed 6.4-6.7% I could check it once a day in the morning. \" Noted pt takes Betapace and has hypoglycemia unawareness. Pt states in early December on the 8th he got a 24 hour virus, after that he had no appetite, pt states he stopped checking his blood glucose levels but was still taking his insulin. Pt admits to knowing he should not have done that and states \"I have no excuses. \" Pt also states his wife was admitted to the hospital in December and had emergent surgery. Empathized with pt regarding external circumstances and discussed insulin safety. Educated pt regarding sick day rules. Encouraged pt to discuss with PCP a sick day insulin plan regarding reduced insulin doses. Pt verbalized understanding. Reviewed hypoglycemia signs, symptoms, and treatment. Pt states he is supposed to go home today. Pt verbalized understanding and voices no further questions regarding diabetes management at this time.

## 2019-12-23 NOTE — DISCHARGE INSTRUCTIONS
Disposition: home with home health  Activity: No driving until cleared by primary care  Diet: DIET DIABETIC CONSISTENT CARB Regular     No driving until hypoglycemia resolved  Eat regular meals throughout the day to maintain steady blood sugars. Check blood sugar regularly especially before administering insulin. Drink plenty of clear liquids to ensure hydration and prevent further episodes of dizziness. Take the antibiotic prescribed as directed and to run out of medication. Arrange follow-up care within 2 days with your primary care provider. Urinary Tract Infections in Men: Care Instructions  Your Care Instructions    A urinary tract infection, or UTI, is a general term for an infection anywhere between the kidneys and the tip of the penis. UTIs can also be a result of a prostate problem. Most cause pain or burning when you urinate. Most UTIs are caused by bacteria and can be cured with antibiotics. It is important to complete your treatment so that the infection does not get worse. Follow-up care is a key part of your treatment and safety. Be sure to make and go to all appointments, and call your doctor if you are having problems. It's also a good idea to know your test results and keep a list of the medicines you take. How can you care for yourself at home? · Take your antibiotics as prescribed. Do not stop taking them just because you feel better. You need to take the full course of antibiotics. · Take your medicines exactly as prescribed. Your doctor may have prescribed a medicine, such as phenazopyridine (Pyridium), to help relieve pain when you urinate. This turns your urine orange. You may stop taking it when your symptoms get better. But be sure to take all of your antibiotics, which treat the infection. · Drink extra water for the next day or two. This will help make the urine less concentrated and help wash out the bacteria causing the infection.  (If you have kidney, heart, or liver disease and have to limit your fluids, talk with your doctor before you increase your fluid intake.)  · Avoid drinks that are carbonated or have caffeine. They can irritate the bladder. · Urinate often. Try to empty your bladder each time. · To relieve pain, take a hot bath or lay a heating pad (set on low) over your lower belly or genital area. Never go to sleep with a heating pad in place. To help prevent UTIs  · Drink plenty of fluids, enough so that your urine is light yellow or clear like water. If you have kidney, heart, or liver disease and have to limit fluids, talk with your doctor before you increase the amount of fluids you drink. · Urinate when you have the urge. Do not hold your urine for a long time. Urinate before you go to sleep. · Keep your penis clean. Catheter care  If you have a drainage tube (catheter) in place, the following steps will help you care for it. · Always wash your hands before and after touching your catheter. · Check the area around the urethra for inflammation or signs of infection. Signs of infection include irritated, swollen, red, or tender skin, or pus around the catheter. · Clean the area around the catheter with soap and water two times a day. Dry with a clean towel afterward. · Do not apply powder or lotion to the skin around the catheter. To empty the urine collection bag  · Wash your hands with soap and water. · Without touching the drain spout, remove the spout from its sleeve at the bottom of the collection bag. Open the valve on the spout. · Let the urine flow out of the bag and into the toilet or a container. Do not let the tubing or drain spout touch anything. · After you empty the bag, clean the end of the drain spout with tissue and water. Close the valve and put the drain spout back into its sleeve at the bottom of the collection bag. · Wash your hands with soap and water. When should you call for help?   Call your doctor now or seek immediate medical care if:    · Symptoms such as a fever, chills, nausea, or vomiting get worse or happen for the first time.     · You have new pain in your back just below your rib cage. This is called flank pain.     · There is new blood or pus in your urine.     · You are not able to take or keep down your antibiotics.    Watch closely for changes in your health, and be sure to contact your doctor if:    · You are not getting better after taking an antibiotic for 2 days.     · Your symptoms go away but then come back. Where can you learn more? Go to http://cheyenne-mera.info/. Enter C486 in the search box to learn more about \"Urinary Tract Infections in Men: Care Instructions. \"  Current as of: December 19, 2018  Content Version: 12.2  © 5656-9640 [employer]. Care instructions adapted under license by [employer] (which disclaims liability or warranty for this information). If you have questions about a medical condition or this instruction, always ask your healthcare professional. [place] [address] any warranty or liability for your use of this information. Patient Education        Hypoglycemia: Care Instructions  Your Care Instructions    Hypoglycemia means that your blood sugar is low and your body is not getting enough fuel. Some people get low blood sugar from not eating often enough. Some medicines to treat diabetes can cause low blood sugar. People who have had surgery on their stomachs or intestines may get hypoglycemia. Problems with the pancreas, kidneys, or liver also can cause low blood sugar. A snack or drink with sugar in it will raise your blood sugar and should ease your symptoms right away. Your doctor may recommend that you change or stop your medicines until you can get your blood sugar levels under control.  In the long run, you may need to change your diet and eating habits so that you get enough fuel for your body throughout the day.  Follow-up care is a key part of your treatment and safety. Be sure to make and go to all appointments, and call your doctor if you are having problems. It's also a good idea to know your test results and keep a list of the medicines you take. How can you care for yourself at home? · Learn to recognize the early signs of low blood sugar. Signs include:  ? Nausea. ? Hunger. ? Feeling nervous, irritable, or shaky. ? Cold, clammy, wet skin. ? Sweating (when you are not exercising). ? A fast heartbeat.  ? Numbness or tingling of the fingertips or lips. · If you feel an episode of low blood sugar coming on, drink fruit juice or sugared (not diet) soda, or eat sugar in the form of candy, cubes, or tablets. [employer] are another [employer]. · Eat small, frequent meals so that you do not get too hungry between meals. · Balance extra exercise with eating more. · Keep a written record of your low blood sugar episodes, including when you last ate and what you ate, so that you can learn what causes your blood sugar to drop. · Make sure your family, friends, and coworkers know the symptoms of low blood sugar and know what to do to get your sugar level up. · Wear medical alert jewelry that lists your condition. You can buy this at most drugstores. When should you call for help? Call 911 anytime you think you may need emergency care. For example, call if:    · You passed out (lost consciousness).     · You are confused or cannot think clearly.     · Your blood sugar is very high or very low.    Watch closely for changes in your health, and be sure to contact your doctor if:    · Your blood sugar stays outside the level your doctor set for you.     · You have any problems. Where can you learn more? Go to http://cheyenne-mera.info/. Enter O027 in the search box to learn more about \"Hypoglycemia: Care Instructions. \"  Current as of: April 16, 2019  Content Version: 12.2  © 4534-2024 [employer]. Care instructions adapted under license by [employer] (which disclaims liability or warranty for this information). If you have questions about a medical condition or this instruction, always ask your healthcare professional. [address] any warranty or liability for your use of this information. Patient Education        Lightheadedness or Faintness: Care Instructions  Your Care Instructions  Lightheadedness is a feeling that you are about to faint or \"pass out. \" You do not feel as if you or your surroundings are moving. It is different from vertigo, which is the feeling that you or things around you are spinning or tilting. Lightheadedness usually goes away or gets better when you lie down. If lightheadedness gets worse, it can lead to a fainting spell. It is common to feel lightheaded from time to time. Lightheadedness usually is not caused by a serious problem. It often is caused by a short-lasting drop in blood pressure and blood flow to your head that occurs when you get up too quickly from a seated or lying position. Follow-up care is a key part of your treatment and safety. Be sure to make and go to all appointments, and call your doctor if you are having problems. It's also a good idea to know your test results and keep a list of the medicines you take. How can you care for yourself at home? · Lie down for 1 or 2 minutes when you feel lightheaded. After lying down, sit up slowly and remain sitting for 1 to 2 minutes before slowly standing up. · Avoid movements, positions, or activities that have made you lightheaded in the past.  · Get plenty of rest, especially if you have a cold or flu, which can cause lightheadedness. · Make sure you drink plenty of fluids, especially if you have a fever or have been sweating. · Do not drive or put yourself and others in danger while you feel lightheaded. When should you call for help?   Call 911 anytime you think you may need emergency care. For example, call if:    · You have symptoms of a stroke. These may include:  ? Sudden numbness, tingling, weakness, or loss of movement in your face, arm, or leg, especially on only one side of your body. ? Sudden vision changes. ? Sudden trouble speaking. ? Sudden confusion or trouble understanding simple statements. ? Sudden problems with walking or balance. ? A sudden, severe headache that is different from past headaches.     · You have symptoms of a heart attack. These may include:  ? Chest pain or pressure, or a strange feeling in the chest.  ? Sweating. ? Shortness of breath. ? Nausea or vomiting. ? Pain, pressure, or a strange feeling in the back, neck, jaw, or upper belly or in one or both shoulders or arms. ? Lightheadedness or sudden weakness. ? A fast or irregular heartbeat. After you call 911, the  may tell you to chew 1 adult-strength or 2 to 4 low-dose aspirin. Wait for an ambulance. Do not try to drive yourself.    Watch closely for changes in your health, and be sure to contact your doctor if:    · Your lightheadedness gets worse or does not get better with home care. Where can you learn more? Go to http://cheyenne-mera.info/. Enter N860 in the search box to learn more about \"Lightheadedness or Faintness: Care Instructions. \"  Current as of: June 26, 2019  Content Version: 12.2  © 2218-9736 [employer]. Care instructions adapted under license by [employer] (which disclaims liability or warranty for this information). If you have questions about a medical condition or this instruction, always ask your healthcare professional. [place] [address] any warranty or liability for your use of this information.     DISCHARGE SUMMARY from Nurse    PATIENT INSTRUCTIONS:    After general anesthesia or intravenous sedation, for 24 hours or while taking prescription Narcotics:  · Limit your activities  · Do not drive and operate hazardous machinery  · Do not make important personal or business decisions  · Do  not drink alcoholic beverages  · If you have not urinated within 8 hours after discharge, please contact your surgeon on call. Report the following to your surgeon:  · Excessive pain, swelling, redness or odor of or around the surgical area  · Temperature over 100.5  · Nausea and vomiting lasting longer than 4 hours or if unable to take medications  · Any signs of decreased circulation or nerve impairment to extremity: change in color, persistent  numbness, tingling, coldness or increase pain  · Any questions    What to do at Home:  Recommended activity: Activity as tolerated. If you experience any of the following symptoms:  Fever greater than 100.5/ chills,  Lightheadedness or faint feeling,  please follow up with your doctor. *  Please give a list of your current medications to your Primary Care Provider. *  Please update this list whenever your medications are discontinued, doses are      changed, or new medications (including over-the-counter products) are added. *  Please carry medication information at all times in case of emergency situations. These are general instructions for a healthy lifestyle:    No smoking/ No tobacco products/ Avoid exposure to second hand smoke  Surgeon General's Warning:  Quitting smoking now greatly reduces serious risk to your health. Obesity, smoking, and sedentary lifestyle greatly increases your risk for illness    A healthy diet, regular physical exercise & weight monitoring are important for maintaining a healthy lifestyle    You may be retaining fluid if you have a history of heart failure or if you experience any of the following symptoms:  Weight gain of 3 pounds or more overnight or 5 pounds in a week, increased swelling in our hands or feet or shortness of breath while lying flat in bed.   Please call your doctor as soon as you notice any of these symptoms; do not wait until your next office visit. The discharge information has been reviewed with the patient. The patient verbalized understanding. Discharge medications reviewed with the patient and appropriate educational materials and side effects teaching were provided.   ___________________________________________________________________________________________________________________________________

## 2019-12-23 NOTE — PROGRESS NOTES
Spoke to Mr. Victoria Montoya in room 203 about Case Management (consult noted) and discharge planning. He lives with his [place] in a one-story house with 2 steps up. Prior to admit, Mr. Victoria Montoya was somewhat limited with ADLs for the last 5 years or so. However, he has adapted with DME such as a rollator and a lift chair. His wife just got out of the hospital related to a GI bleed. We discussed home health (as recommended by PT). Discussed various agencies ([facility] cannot take any new referrals today). He agreed to [facility] home health. Referral faxed to [facility] at fax [phone] (phone [phone]) for RN, OT, and PT (he declined SW and aide). No additional discharge needs identified, except for him and his wife to discuss longer-term plans, such as moving in with their daughter in [place], [place]. Care Management Interventions Transition of Care Consult (CM Consult): Home Health [address]: No 
Reason Outside [employer] Chosen: Unable to staff case Physical Therapy Consult: Yes Current Support Network: Lives with Spouse, Own Home The Plan for Transition of Care is Related to the Following Treatment Goals : Home health OT, PT, and RN The Patient and/or Patient Representative was Provided with a Choice of Provider and Agrees with the Discharge Plan?: Yes Name of the Patient Representative Who was Provided with a Choice of Provider and Agrees with the Discharge Plan: Mr. Victoria Montoya Freedom of Choice List was Provided with Basic Dialogue that Supports the Patient's Individualized Plan of Care/Goals, Treatment Preferences and Shares the Quality Data Associated with the Providers?: Yes [employer] Information Provided?: No

## 2020-01-01 ENCOUNTER — APPOINTMENT (OUTPATIENT)
Dept: CT IMAGING | Age: 80
DRG: 604 | End: 2020-01-01
Attending: EMERGENCY MEDICINE
Payer: MEDICARE

## 2020-01-01 ENCOUNTER — HOSPITAL ENCOUNTER (INPATIENT)
Age: 80
LOS: 18 days | DRG: 604 | End: 2020-02-04
Attending: EMERGENCY MEDICINE | Admitting: INTERNAL MEDICINE
Payer: MEDICARE

## 2020-01-01 ENCOUNTER — APPOINTMENT (OUTPATIENT)
Dept: GENERAL RADIOLOGY | Age: 80
DRG: 604 | End: 2020-01-01
Attending: INTERNAL MEDICINE
Payer: MEDICARE

## 2020-01-01 ENCOUNTER — APPOINTMENT (OUTPATIENT)
Dept: CT IMAGING | Age: 80
DRG: 604 | End: 2020-01-01
Attending: INTERNAL MEDICINE
Payer: MEDICARE

## 2020-01-01 ENCOUNTER — APPOINTMENT (OUTPATIENT)
Dept: ULTRASOUND IMAGING | Age: 80
DRG: 604 | End: 2020-01-01
Attending: INTERNAL MEDICINE
Payer: MEDICARE

## 2020-01-01 ENCOUNTER — APPOINTMENT (OUTPATIENT)
Dept: GENERAL RADIOLOGY | Age: 80
DRG: 604 | End: 2020-01-01
Attending: EMERGENCY MEDICINE
Payer: MEDICARE

## 2020-01-01 ENCOUNTER — APPOINTMENT (OUTPATIENT)
Dept: GENERAL RADIOLOGY | Age: 80
DRG: 604 | End: 2020-01-01
Attending: FAMILY MEDICINE
Payer: MEDICARE

## 2020-01-01 VITALS
OXYGEN SATURATION: 96 % | BODY MASS INDEX: 42.66 KG/M2 | RESPIRATION RATE: 30 BRPM | TEMPERATURE: 98.4 F | SYSTOLIC BLOOD PRESSURE: 74 MMHG | HEIGHT: 72 IN | HEART RATE: 78 BPM | DIASTOLIC BLOOD PRESSURE: 41 MMHG | WEIGHT: 315 LBS

## 2020-01-01 DIAGNOSIS — N64.89 HEMATOMA (NONTRAUMATIC) OF BREAST: Primary | ICD-10-CM

## 2020-01-01 DIAGNOSIS — I95.89 OTHER SPECIFIED HYPOTENSION: ICD-10-CM

## 2020-01-01 LAB
ABO + RH BLD: NORMAL
ABO + RH BLD: NORMAL
ACC. NO. FROM MICRO ORDER, ACCP: ABNORMAL
ALBUMIN SERPL-MCNC: 1.8 G/DL (ref 3.2–4.6)
ALBUMIN SERPL-MCNC: 1.8 G/DL (ref 3.2–4.6)
ALBUMIN SERPL-MCNC: 1.9 G/DL (ref 3.2–4.6)
ALBUMIN SERPL-MCNC: 2.1 G/DL (ref 3.2–4.6)
ALBUMIN/GLOB SERPL: 0.4 {RATIO} (ref 1.2–3.5)
ALBUMIN/GLOB SERPL: 0.5 {RATIO} (ref 1.2–3.5)
ALBUMIN/GLOB SERPL: 0.6 {RATIO} (ref 1.2–3.5)
ALP SERPL-CCNC: 33 U/L (ref 50–136)
ALP SERPL-CCNC: 34 U/L (ref 50–136)
ALP SERPL-CCNC: 35 U/L (ref 50–136)
ALP SERPL-CCNC: 36 U/L (ref 50–136)
ALP SERPL-CCNC: 37 U/L (ref 50–136)
ALP SERPL-CCNC: 38 U/L (ref 50–136)
ALP SERPL-CCNC: 38 U/L (ref 50–136)
ALP SERPL-CCNC: 40 U/L (ref 50–136)
ALP SERPL-CCNC: 41 U/L (ref 50–136)
ALP SERPL-CCNC: 49 U/L (ref 50–136)
ALT SERPL-CCNC: 11 U/L (ref 12–65)
ALT SERPL-CCNC: 12 U/L (ref 12–65)
ALT SERPL-CCNC: 13 U/L (ref 12–65)
ALT SERPL-CCNC: 15 U/L (ref 12–65)
ALT SERPL-CCNC: 9 U/L (ref 12–65)
ALT SERPL-CCNC: 93 U/L (ref 12–65)
ANION GAP SERPL CALC-SCNC: 10 MMOL/L (ref 7–16)
ANION GAP SERPL CALC-SCNC: 13 MMOL/L (ref 7–16)
ANION GAP SERPL CALC-SCNC: 13 MMOL/L (ref 7–16)
ANION GAP SERPL CALC-SCNC: 2 MMOL/L (ref 7–16)
ANION GAP SERPL CALC-SCNC: 4 MMOL/L (ref 7–16)
ANION GAP SERPL CALC-SCNC: 5 MMOL/L (ref 7–16)
ANION GAP SERPL CALC-SCNC: 6 MMOL/L (ref 7–16)
ANION GAP SERPL CALC-SCNC: 6 MMOL/L (ref 7–16)
ANION GAP SERPL CALC-SCNC: 7 MMOL/L (ref 7–16)
ANION GAP SERPL CALC-SCNC: 8 MMOL/L (ref 7–16)
ANION GAP SERPL CALC-SCNC: 9 MMOL/L (ref 7–16)
APPEARANCE UR: CLEAR
APPEARANCE UR: CLEAR
APTT PPP: 33.1 SEC (ref 24.7–39.8)
APTT PPP: 39.1 SEC (ref 24.7–39.8)
ARTERIAL PATENCY WRIST A: ABNORMAL
ARTERIAL PATENCY WRIST A: YES
AST SERPL-CCNC: 14 U/L (ref 15–37)
AST SERPL-CCNC: 14 U/L (ref 15–37)
AST SERPL-CCNC: 16 U/L (ref 15–37)
AST SERPL-CCNC: 19 U/L (ref 15–37)
AST SERPL-CCNC: 19 U/L (ref 15–37)
AST SERPL-CCNC: 20 U/L (ref 15–37)
AST SERPL-CCNC: 208 U/L (ref 15–37)
AST SERPL-CCNC: 21 U/L (ref 15–37)
AST SERPL-CCNC: 23 U/L (ref 15–37)
AST SERPL-CCNC: 26 U/L (ref 15–37)
ATRIAL RATE: 78 BPM
ATRIAL RATE: 82 BPM
BACTERIA SPEC CULT: ABNORMAL
BACTERIA SPEC CULT: NORMAL
BACTERIA URNS QL MICRO: ABNORMAL /HPF
BACTERIA URNS QL MICRO: ABNORMAL /HPF
BASE DEFICIT BLD-SCNC: 1 MMOL/L
BASE DEFICIT BLD-SCNC: 2 MMOL/L
BASE DEFICIT BLD-SCNC: 5 MMOL/L
BASE EXCESS BLD CALC-SCNC: 0 MMOL/L
BASOPHILS # BLD: 0 K/UL (ref 0–0.2)
BASOPHILS # BLD: 0.1 K/UL (ref 0–0.2)
BASOPHILS NFR BLD: 0 % (ref 0–2)
BDY SITE: ABNORMAL
BDY SITE: NORMAL
BILIRUB SERPL-MCNC: 0.3 MG/DL (ref 0.2–1.1)
BILIRUB SERPL-MCNC: 0.6 MG/DL (ref 0.2–1.1)
BILIRUB SERPL-MCNC: 0.7 MG/DL (ref 0.2–1.1)
BILIRUB SERPL-MCNC: 0.8 MG/DL (ref 0.2–1.1)
BILIRUB SERPL-MCNC: 1 MG/DL (ref 0.2–1.1)
BILIRUB SERPL-MCNC: 1.1 MG/DL (ref 0.2–1.1)
BILIRUB SERPL-MCNC: 1.3 MG/DL (ref 0.2–1.1)
BILIRUB SERPL-MCNC: 2.1 MG/DL (ref 0.2–1.1)
BILIRUB UR QL: NEGATIVE
BILIRUB UR QL: NEGATIVE
BLD PROD TYP BPU: NORMAL
BLOOD GROUP ANTIBODIES SERPL: NORMAL
BLOOD GROUP ANTIBODIES SERPL: NORMAL
BNP SERPL-MCNC: 2147 PG/ML
BPU ID: NORMAL
BUN SERPL-MCNC: 18 MG/DL (ref 8–23)
BUN SERPL-MCNC: 18 MG/DL (ref 8–23)
BUN SERPL-MCNC: 21 MG/DL (ref 8–23)
BUN SERPL-MCNC: 21 MG/DL (ref 8–23)
BUN SERPL-MCNC: 23 MG/DL (ref 8–23)
BUN SERPL-MCNC: 24 MG/DL (ref 8–23)
BUN SERPL-MCNC: 28 MG/DL (ref 8–23)
BUN SERPL-MCNC: 28 MG/DL (ref 8–23)
BUN SERPL-MCNC: 29 MG/DL (ref 8–23)
BUN SERPL-MCNC: 36 MG/DL (ref 8–23)
BUN SERPL-MCNC: 44 MG/DL (ref 8–23)
BUN SERPL-MCNC: 50 MG/DL (ref 8–23)
BUN SERPL-MCNC: 55 MG/DL (ref 8–23)
BUN SERPL-MCNC: 56 MG/DL (ref 8–23)
BUN SERPL-MCNC: 56 MG/DL (ref 8–23)
BUN SERPL-MCNC: 57 MG/DL (ref 8–23)
CALCIUM SERPL-MCNC: 7.5 MG/DL (ref 8.3–10.4)
CALCIUM SERPL-MCNC: 7.8 MG/DL (ref 8.3–10.4)
CALCIUM SERPL-MCNC: 8.1 MG/DL (ref 8.3–10.4)
CALCIUM SERPL-MCNC: 8.2 MG/DL (ref 8.3–10.4)
CALCIUM SERPL-MCNC: 8.2 MG/DL (ref 8.3–10.4)
CALCIUM SERPL-MCNC: 8.3 MG/DL (ref 8.3–10.4)
CALCIUM SERPL-MCNC: 8.5 MG/DL (ref 8.3–10.4)
CALCIUM SERPL-MCNC: 8.5 MG/DL (ref 8.3–10.4)
CALCIUM SERPL-MCNC: 8.6 MG/DL (ref 8.3–10.4)
CALCIUM SERPL-MCNC: 8.7 MG/DL (ref 8.3–10.4)
CALCIUM SERPL-MCNC: 8.8 MG/DL (ref 8.3–10.4)
CALCIUM SERPL-MCNC: 8.8 MG/DL (ref 8.3–10.4)
CALCIUM SERPL-MCNC: 9 MG/DL (ref 8.3–10.4)
CALCIUM SERPL-MCNC: 9.1 MG/DL (ref 8.3–10.4)
CALCULATED P AXIS, ECG09: -51 DEGREES
CALCULATED P AXIS, ECG09: 33 DEGREES
CALCULATED R AXIS, ECG10: -52 DEGREES
CALCULATED R AXIS, ECG10: -76 DEGREES
CALCULATED T AXIS, ECG11: -74 DEGREES
CALCULATED T AXIS, ECG11: 84 DEGREES
CASTS URNS QL MICRO: ABNORMAL /LPF
CASTS URNS QL MICRO: ABNORMAL /LPF
CHLORIDE SERPL-SCNC: 101 MMOL/L (ref 98–107)
CHLORIDE SERPL-SCNC: 103 MMOL/L (ref 98–107)
CHLORIDE SERPL-SCNC: 104 MMOL/L (ref 98–107)
CHLORIDE SERPL-SCNC: 105 MMOL/L (ref 98–107)
CHLORIDE SERPL-SCNC: 106 MMOL/L (ref 98–107)
CHLORIDE SERPL-SCNC: 107 MMOL/L (ref 98–107)
CHLORIDE SERPL-SCNC: 107 MMOL/L (ref 98–107)
CHLORIDE SERPL-SCNC: 108 MMOL/L (ref 98–107)
CHLORIDE SERPL-SCNC: 110 MMOL/L (ref 98–107)
CO2 BLD-SCNC: 23 MMOL/L
CO2 BLD-SCNC: 23 MMOL/L
CO2 BLD-SCNC: 26 MMOL/L
CO2 BLD-SCNC: 27 MMOL/L
CO2 SERPL-SCNC: 21 MMOL/L (ref 21–32)
CO2 SERPL-SCNC: 21 MMOL/L (ref 21–32)
CO2 SERPL-SCNC: 23 MMOL/L (ref 21–32)
CO2 SERPL-SCNC: 23 MMOL/L (ref 21–32)
CO2 SERPL-SCNC: 24 MMOL/L (ref 21–32)
CO2 SERPL-SCNC: 25 MMOL/L (ref 21–32)
CO2 SERPL-SCNC: 26 MMOL/L (ref 21–32)
CO2 SERPL-SCNC: 27 MMOL/L (ref 21–32)
CO2 SERPL-SCNC: 29 MMOL/L (ref 21–32)
COLLECT TIME,HTIME: 1519
COLLECT TIME,HTIME: 2120
COLLECT TIME,HTIME: 314
COLLECT TIME,HTIME: 429
COLLECT TIME,HTIME: 450
COLOR UR: YELLOW
COLOR UR: YELLOW
CREAT SERPL-MCNC: 1.34 MG/DL (ref 0.8–1.5)
CREAT SERPL-MCNC: 1.34 MG/DL (ref 0.8–1.5)
CREAT SERPL-MCNC: 1.36 MG/DL (ref 0.8–1.5)
CREAT SERPL-MCNC: 1.37 MG/DL (ref 0.8–1.5)
CREAT SERPL-MCNC: 1.39 MG/DL (ref 0.8–1.5)
CREAT SERPL-MCNC: 1.4 MG/DL (ref 0.8–1.5)
CREAT SERPL-MCNC: 1.44 MG/DL (ref 0.8–1.5)
CREAT SERPL-MCNC: 1.53 MG/DL (ref 0.8–1.5)
CREAT SERPL-MCNC: 1.63 MG/DL (ref 0.8–1.5)
CREAT SERPL-MCNC: 1.72 MG/DL (ref 0.8–1.5)
CREAT SERPL-MCNC: 1.87 MG/DL (ref 0.8–1.5)
CREAT SERPL-MCNC: 2.05 MG/DL (ref 0.8–1.5)
CREAT SERPL-MCNC: 2.48 MG/DL (ref 0.8–1.5)
CREAT SERPL-MCNC: 3.21 MG/DL (ref 0.8–1.5)
CREAT SERPL-MCNC: 3.45 MG/DL (ref 0.8–1.5)
CREAT SERPL-MCNC: 3.82 MG/DL (ref 0.8–1.5)
CROSSMATCH RESULT,%XM: NORMAL
DIAGNOSIS, 93000: NORMAL
DIAGNOSIS, 93000: NORMAL
DIFFERENTIAL METHOD BLD: ABNORMAL
EOSINOPHIL # BLD: 0 K/UL (ref 0–0.8)
EOSINOPHIL # BLD: 0.1 K/UL (ref 0–0.8)
EOSINOPHIL # BLD: 0.1 K/UL (ref 0–0.8)
EOSINOPHIL NFR BLD: 0 % (ref 0.5–7.8)
EOSINOPHIL NFR BLD: 1 % (ref 0.5–7.8)
EPI CELLS #/AREA URNS HPF: 0 /HPF
EPI CELLS #/AREA URNS HPF: ABNORMAL /HPF
ERYTHROCYTE [DISTWIDTH] IN BLOOD BY AUTOMATED COUNT: 18.2 % (ref 11.9–14.6)
ERYTHROCYTE [DISTWIDTH] IN BLOOD BY AUTOMATED COUNT: 18.6 % (ref 11.9–14.6)
ERYTHROCYTE [DISTWIDTH] IN BLOOD BY AUTOMATED COUNT: 19.5 % (ref 11.9–14.6)
ERYTHROCYTE [DISTWIDTH] IN BLOOD BY AUTOMATED COUNT: 19.8 % (ref 11.9–14.6)
ERYTHROCYTE [DISTWIDTH] IN BLOOD BY AUTOMATED COUNT: 20.6 % (ref 11.9–14.6)
ERYTHROCYTE [DISTWIDTH] IN BLOOD BY AUTOMATED COUNT: 21.8 % (ref 11.9–14.6)
ERYTHROCYTE [DISTWIDTH] IN BLOOD BY AUTOMATED COUNT: 22.5 % (ref 11.9–14.6)
ERYTHROCYTE [DISTWIDTH] IN BLOOD BY AUTOMATED COUNT: 23.2 % (ref 11.9–14.6)
ERYTHROCYTE [DISTWIDTH] IN BLOOD BY AUTOMATED COUNT: 23.4 % (ref 11.9–14.6)
ERYTHROCYTE [DISTWIDTH] IN BLOOD BY AUTOMATED COUNT: 23.7 % (ref 11.9–14.6)
ERYTHROCYTE [DISTWIDTH] IN BLOOD BY AUTOMATED COUNT: 23.7 % (ref 11.9–14.6)
ERYTHROCYTE [DISTWIDTH] IN BLOOD BY AUTOMATED COUNT: 23.8 % (ref 11.9–14.6)
ERYTHROCYTE [DISTWIDTH] IN BLOOD BY AUTOMATED COUNT: 23.9 % (ref 11.9–14.6)
ERYTHROCYTE [DISTWIDTH] IN BLOOD BY AUTOMATED COUNT: 24.6 % (ref 11.9–14.6)
ERYTHROCYTE [DISTWIDTH] IN BLOOD BY AUTOMATED COUNT: 24.7 % (ref 11.9–14.6)
ERYTHROCYTE [DISTWIDTH] IN BLOOD BY AUTOMATED COUNT: 24.8 % (ref 11.9–14.6)
ESCHERICHIA COLI: DETECTED
EXHALED MINUTE VOLUME, VE: 22 L/MIN
FERRITIN SERPL-MCNC: 36 NG/ML (ref 8–388)
FLOW RATE ISTAT,IFRATE: 10 L/MIN
FLOW RATE ISTAT,IFRATE: 2 L/MIN
FLOW RATE ISTAT,IFRATE: 3 L/MIN
GAS FLOW.O2 O2 DELIVERY SYS: ABNORMAL L/MIN
GAS FLOW.O2 O2 DELIVERY SYS: NORMAL L/MIN
GLOBULIN SER CALC-MCNC: 3.2 G/DL (ref 2.3–3.5)
GLOBULIN SER CALC-MCNC: 3.3 G/DL (ref 2.3–3.5)
GLOBULIN SER CALC-MCNC: 3.4 G/DL (ref 2.3–3.5)
GLOBULIN SER CALC-MCNC: 3.7 G/DL (ref 2.3–3.5)
GLOBULIN SER CALC-MCNC: 3.7 G/DL (ref 2.3–3.5)
GLOBULIN SER CALC-MCNC: 3.8 G/DL (ref 2.3–3.5)
GLOBULIN SER CALC-MCNC: 3.9 G/DL (ref 2.3–3.5)
GLOBULIN SER CALC-MCNC: 4 G/DL (ref 2.3–3.5)
GLOBULIN SER CALC-MCNC: 4 G/DL (ref 2.3–3.5)
GLOBULIN SER CALC-MCNC: 4.2 G/DL (ref 2.3–3.5)
GLUCOSE BLD STRIP.AUTO-MCNC: 102 MG/DL (ref 65–100)
GLUCOSE BLD STRIP.AUTO-MCNC: 103 MG/DL (ref 65–100)
GLUCOSE BLD STRIP.AUTO-MCNC: 106 MG/DL (ref 65–100)
GLUCOSE BLD STRIP.AUTO-MCNC: 116 MG/DL (ref 65–100)
GLUCOSE BLD STRIP.AUTO-MCNC: 116 MG/DL (ref 65–100)
GLUCOSE BLD STRIP.AUTO-MCNC: 118 MG/DL (ref 65–100)
GLUCOSE BLD STRIP.AUTO-MCNC: 122 MG/DL (ref 65–100)
GLUCOSE BLD STRIP.AUTO-MCNC: 122 MG/DL (ref 65–100)
GLUCOSE BLD STRIP.AUTO-MCNC: 123 MG/DL (ref 65–100)
GLUCOSE BLD STRIP.AUTO-MCNC: 123 MG/DL (ref 65–100)
GLUCOSE BLD STRIP.AUTO-MCNC: 124 MG/DL (ref 65–100)
GLUCOSE BLD STRIP.AUTO-MCNC: 127 MG/DL (ref 65–100)
GLUCOSE BLD STRIP.AUTO-MCNC: 129 MG/DL (ref 65–100)
GLUCOSE BLD STRIP.AUTO-MCNC: 130 MG/DL (ref 65–100)
GLUCOSE BLD STRIP.AUTO-MCNC: 131 MG/DL (ref 65–100)
GLUCOSE BLD STRIP.AUTO-MCNC: 131 MG/DL (ref 65–100)
GLUCOSE BLD STRIP.AUTO-MCNC: 133 MG/DL (ref 65–100)
GLUCOSE BLD STRIP.AUTO-MCNC: 134 MG/DL (ref 65–100)
GLUCOSE BLD STRIP.AUTO-MCNC: 135 MG/DL (ref 65–100)
GLUCOSE BLD STRIP.AUTO-MCNC: 136 MG/DL (ref 65–100)
GLUCOSE BLD STRIP.AUTO-MCNC: 139 MG/DL (ref 65–100)
GLUCOSE BLD STRIP.AUTO-MCNC: 139 MG/DL (ref 65–100)
GLUCOSE BLD STRIP.AUTO-MCNC: 140 MG/DL (ref 65–100)
GLUCOSE BLD STRIP.AUTO-MCNC: 141 MG/DL (ref 65–100)
GLUCOSE BLD STRIP.AUTO-MCNC: 141 MG/DL (ref 65–100)
GLUCOSE BLD STRIP.AUTO-MCNC: 142 MG/DL (ref 65–100)
GLUCOSE BLD STRIP.AUTO-MCNC: 142 MG/DL (ref 65–100)
GLUCOSE BLD STRIP.AUTO-MCNC: 143 MG/DL (ref 65–100)
GLUCOSE BLD STRIP.AUTO-MCNC: 144 MG/DL (ref 65–100)
GLUCOSE BLD STRIP.AUTO-MCNC: 145 MG/DL (ref 65–100)
GLUCOSE BLD STRIP.AUTO-MCNC: 146 MG/DL (ref 65–100)
GLUCOSE BLD STRIP.AUTO-MCNC: 146 MG/DL (ref 65–100)
GLUCOSE BLD STRIP.AUTO-MCNC: 148 MG/DL (ref 65–100)
GLUCOSE BLD STRIP.AUTO-MCNC: 149 MG/DL (ref 65–100)
GLUCOSE BLD STRIP.AUTO-MCNC: 151 MG/DL (ref 65–100)
GLUCOSE BLD STRIP.AUTO-MCNC: 152 MG/DL (ref 65–100)
GLUCOSE BLD STRIP.AUTO-MCNC: 154 MG/DL (ref 65–100)
GLUCOSE BLD STRIP.AUTO-MCNC: 156 MG/DL (ref 65–100)
GLUCOSE BLD STRIP.AUTO-MCNC: 157 MG/DL (ref 65–100)
GLUCOSE BLD STRIP.AUTO-MCNC: 159 MG/DL (ref 65–100)
GLUCOSE BLD STRIP.AUTO-MCNC: 159 MG/DL (ref 65–100)
GLUCOSE BLD STRIP.AUTO-MCNC: 160 MG/DL (ref 65–100)
GLUCOSE BLD STRIP.AUTO-MCNC: 162 MG/DL (ref 65–100)
GLUCOSE BLD STRIP.AUTO-MCNC: 162 MG/DL (ref 65–100)
GLUCOSE BLD STRIP.AUTO-MCNC: 163 MG/DL (ref 65–100)
GLUCOSE BLD STRIP.AUTO-MCNC: 164 MG/DL (ref 65–100)
GLUCOSE BLD STRIP.AUTO-MCNC: 166 MG/DL (ref 65–100)
GLUCOSE BLD STRIP.AUTO-MCNC: 167 MG/DL (ref 65–100)
GLUCOSE BLD STRIP.AUTO-MCNC: 170 MG/DL (ref 65–100)
GLUCOSE BLD STRIP.AUTO-MCNC: 172 MG/DL (ref 65–100)
GLUCOSE BLD STRIP.AUTO-MCNC: 172 MG/DL (ref 65–100)
GLUCOSE BLD STRIP.AUTO-MCNC: 173 MG/DL (ref 65–100)
GLUCOSE BLD STRIP.AUTO-MCNC: 174 MG/DL (ref 65–100)
GLUCOSE BLD STRIP.AUTO-MCNC: 179 MG/DL (ref 65–100)
GLUCOSE BLD STRIP.AUTO-MCNC: 183 MG/DL (ref 65–100)
GLUCOSE BLD STRIP.AUTO-MCNC: 185 MG/DL (ref 65–100)
GLUCOSE BLD STRIP.AUTO-MCNC: 186 MG/DL (ref 65–100)
GLUCOSE BLD STRIP.AUTO-MCNC: 186 MG/DL (ref 65–100)
GLUCOSE BLD STRIP.AUTO-MCNC: 187 MG/DL (ref 65–100)
GLUCOSE BLD STRIP.AUTO-MCNC: 187 MG/DL (ref 65–100)
GLUCOSE BLD STRIP.AUTO-MCNC: 188 MG/DL (ref 65–100)
GLUCOSE BLD STRIP.AUTO-MCNC: 191 MG/DL (ref 65–100)
GLUCOSE BLD STRIP.AUTO-MCNC: 194 MG/DL (ref 65–100)
GLUCOSE BLD STRIP.AUTO-MCNC: 197 MG/DL (ref 65–100)
GLUCOSE BLD STRIP.AUTO-MCNC: 197 MG/DL (ref 65–100)
GLUCOSE BLD STRIP.AUTO-MCNC: 198 MG/DL (ref 65–100)
GLUCOSE BLD STRIP.AUTO-MCNC: 200 MG/DL (ref 65–100)
GLUCOSE BLD STRIP.AUTO-MCNC: 207 MG/DL (ref 65–100)
GLUCOSE BLD STRIP.AUTO-MCNC: 208 MG/DL (ref 65–100)
GLUCOSE BLD STRIP.AUTO-MCNC: 212 MG/DL (ref 65–100)
GLUCOSE BLD STRIP.AUTO-MCNC: 82 MG/DL (ref 65–100)
GLUCOSE BLD STRIP.AUTO-MCNC: 84 MG/DL (ref 65–100)
GLUCOSE SERPL-MCNC: 117 MG/DL (ref 65–100)
GLUCOSE SERPL-MCNC: 120 MG/DL (ref 65–100)
GLUCOSE SERPL-MCNC: 126 MG/DL (ref 65–100)
GLUCOSE SERPL-MCNC: 127 MG/DL (ref 65–100)
GLUCOSE SERPL-MCNC: 130 MG/DL (ref 65–100)
GLUCOSE SERPL-MCNC: 132 MG/DL (ref 65–100)
GLUCOSE SERPL-MCNC: 132 MG/DL (ref 65–100)
GLUCOSE SERPL-MCNC: 134 MG/DL (ref 65–100)
GLUCOSE SERPL-MCNC: 141 MG/DL (ref 65–100)
GLUCOSE SERPL-MCNC: 149 MG/DL (ref 65–100)
GLUCOSE SERPL-MCNC: 152 MG/DL (ref 65–100)
GLUCOSE SERPL-MCNC: 176 MG/DL (ref 65–100)
GLUCOSE SERPL-MCNC: 177 MG/DL (ref 65–100)
GLUCOSE SERPL-MCNC: 181 MG/DL (ref 65–100)
GLUCOSE SERPL-MCNC: 183 MG/DL (ref 65–100)
GLUCOSE SERPL-MCNC: 77 MG/DL (ref 65–100)
GLUCOSE UR STRIP.AUTO-MCNC: NEGATIVE MG/DL
GLUCOSE UR STRIP.AUTO-MCNC: NEGATIVE MG/DL
GRAM STN SPEC: ABNORMAL
HCO3 BLD-SCNC: 21.7 MMOL/L (ref 22–26)
HCO3 BLD-SCNC: 22.2 MMOL/L (ref 22–26)
HCO3 BLD-SCNC: 25.1 MMOL/L (ref 22–26)
HCO3 BLD-SCNC: 25.3 MMOL/L (ref 22–26)
HCO3 BLDV-SCNC: 22.9 MMOL/L (ref 23–28)
HCT VFR BLD AUTO: 22.5 % (ref 41.1–50.3)
HCT VFR BLD AUTO: 22.8 % (ref 41.1–50.3)
HCT VFR BLD AUTO: 23.9 % (ref 41.1–50.3)
HCT VFR BLD AUTO: 24 % (ref 41.1–50.3)
HCT VFR BLD AUTO: 24.5 % (ref 41.1–50.3)
HCT VFR BLD AUTO: 25.2 % (ref 41.1–50.3)
HCT VFR BLD AUTO: 25.7 % (ref 41.1–50.3)
HCT VFR BLD AUTO: 25.7 % (ref 41.1–50.3)
HCT VFR BLD AUTO: 25.8 % (ref 41.1–50.3)
HCT VFR BLD AUTO: 27.2 % (ref 41.1–50.3)
HCT VFR BLD AUTO: 28 % (ref 41.1–50.3)
HCT VFR BLD AUTO: 28.3 % (ref 41.1–50.3)
HCT VFR BLD AUTO: 28.8 % (ref 41.1–50.3)
HCT VFR BLD AUTO: 28.8 % (ref 41.1–50.3)
HCT VFR BLD AUTO: 29 % (ref 41.1–50.3)
HCT VFR BLD AUTO: 29 % (ref 41.1–50.3)
HCT VFR BLD AUTO: 29.4 % (ref 41.1–50.3)
HCT VFR BLD AUTO: 30.1 % (ref 41.1–50.3)
HCT VFR BLD AUTO: 30.5 % (ref 41.1–50.3)
HCT VFR BLD AUTO: 31.3 % (ref 41.1–50.3)
HCT VFR BLD AUTO: 34.2 % (ref 41.1–50.3)
HCT VFR BLD AUTO: 37.4 % (ref 41.1–50.3)
HCT VFR BLD AUTO: 38.1 % (ref 41.1–50.3)
HGB BLD-MCNC: 10.2 G/DL (ref 13.6–17.2)
HGB BLD-MCNC: 10.2 G/DL (ref 13.6–17.2)
HGB BLD-MCNC: 10.9 G/DL (ref 13.6–17.2)
HGB BLD-MCNC: 11.1 G/DL (ref 13.6–17.2)
HGB BLD-MCNC: 6.7 G/DL (ref 13.6–17.2)
HGB BLD-MCNC: 7 G/DL (ref 13.6–17.2)
HGB BLD-MCNC: 7.2 G/DL (ref 13.6–17.2)
HGB BLD-MCNC: 7.2 G/DL (ref 13.6–17.2)
HGB BLD-MCNC: 7.3 G/DL (ref 13.6–17.2)
HGB BLD-MCNC: 7.5 G/DL (ref 13.6–17.2)
HGB BLD-MCNC: 7.8 G/DL (ref 13.6–17.2)
HGB BLD-MCNC: 8.3 G/DL (ref 13.6–17.2)
HGB BLD-MCNC: 8.5 G/DL (ref 13.6–17.2)
HGB BLD-MCNC: 8.7 G/DL (ref 13.6–17.2)
HGB BLD-MCNC: 8.7 G/DL (ref 13.6–17.2)
HGB BLD-MCNC: 8.8 G/DL (ref 13.6–17.2)
HGB BLD-MCNC: 8.9 G/DL (ref 13.6–17.2)
HGB BLD-MCNC: 8.9 G/DL (ref 13.6–17.2)
HGB BLD-MCNC: 9.1 G/DL (ref 13.6–17.2)
HGB BLD-MCNC: 9.1 G/DL (ref 13.6–17.2)
HGB UR QL STRIP: NEGATIVE
HGB UR QL STRIP: NEGATIVE
IMM GRANULOCYTES # BLD AUTO: 0.1 K/UL (ref 0–0.5)
IMM GRANULOCYTES # BLD AUTO: 0.2 K/UL (ref 0–0.5)
IMM GRANULOCYTES # BLD AUTO: 0.3 K/UL (ref 0–0.5)
IMM GRANULOCYTES # BLD AUTO: 0.5 K/UL (ref 0–0.5)
IMM GRANULOCYTES NFR BLD AUTO: 1 % (ref 0–5)
IMM GRANULOCYTES NFR BLD AUTO: 2 % (ref 0–5)
INR PPP: 1.1
INR PPP: 1.5
INR PPP: 1.5
INTERPRETATION: ABNORMAL
IRON SERPL-MCNC: 32 UG/DL (ref 35–150)
KETONES UR QL STRIP.AUTO: NEGATIVE MG/DL
KETONES UR QL STRIP.AUTO: NEGATIVE MG/DL
KPC (CARBAPENEM RESISTANCE GENE): NOT DETECTED
LACTATE SERPL-SCNC: 1.8 MMOL/L (ref 0.4–2)
LACTATE SERPL-SCNC: 2 MMOL/L (ref 0.4–2)
LACTATE SERPL-SCNC: 2.9 MMOL/L (ref 0.4–2)
LEUKOCYTE ESTERASE UR QL STRIP.AUTO: ABNORMAL
LEUKOCYTE ESTERASE UR QL STRIP.AUTO: NEGATIVE
LYMPHOCYTES # BLD: 0.4 K/UL (ref 0.5–4.6)
LYMPHOCYTES # BLD: 0.5 K/UL (ref 0.5–4.6)
LYMPHOCYTES # BLD: 0.5 K/UL (ref 0.5–4.6)
LYMPHOCYTES # BLD: 0.9 K/UL (ref 0.5–4.6)
LYMPHOCYTES # BLD: 1.1 K/UL (ref 0.5–4.6)
LYMPHOCYTES # BLD: 1.4 K/UL (ref 0.5–4.6)
LYMPHOCYTES # BLD: 1.6 K/UL (ref 0.5–4.6)
LYMPHOCYTES NFR BLD: 10 % (ref 13–44)
LYMPHOCYTES NFR BLD: 2 % (ref 13–44)
LYMPHOCYTES NFR BLD: 4 % (ref 13–44)
LYMPHOCYTES NFR BLD: 4 % (ref 13–44)
LYMPHOCYTES NFR BLD: 6 % (ref 13–44)
LYMPHOCYTES NFR BLD: 6 % (ref 13–44)
LYMPHOCYTES NFR BLD: 7 % (ref 13–44)
MAGNESIUM SERPL-MCNC: 1.6 MG/DL (ref 1.8–2.4)
MAGNESIUM SERPL-MCNC: 2.1 MG/DL (ref 1.8–2.4)
MCH RBC QN AUTO: 21.3 PG (ref 26.1–32.9)
MCH RBC QN AUTO: 23.4 PG (ref 26.1–32.9)
MCH RBC QN AUTO: 23.5 PG (ref 26.1–32.9)
MCH RBC QN AUTO: 23.6 PG (ref 26.1–32.9)
MCH RBC QN AUTO: 23.6 PG (ref 26.1–32.9)
MCH RBC QN AUTO: 23.7 PG (ref 26.1–32.9)
MCH RBC QN AUTO: 23.8 PG (ref 26.1–32.9)
MCH RBC QN AUTO: 23.9 PG (ref 26.1–32.9)
MCH RBC QN AUTO: 23.9 PG (ref 26.1–32.9)
MCH RBC QN AUTO: 24.2 PG (ref 26.1–32.9)
MCH RBC QN AUTO: 24.3 PG (ref 26.1–32.9)
MCH RBC QN AUTO: 24.3 PG (ref 26.1–32.9)
MCH RBC QN AUTO: 24.4 PG (ref 26.1–32.9)
MCH RBC QN AUTO: 24.4 PG (ref 26.1–32.9)
MCHC RBC AUTO-ENTMCNC: 28.1 G/DL (ref 31.4–35)
MCHC RBC AUTO-ENTMCNC: 28.8 G/DL (ref 31.4–35)
MCHC RBC AUTO-ENTMCNC: 29 G/DL (ref 31.4–35)
MCHC RBC AUTO-ENTMCNC: 29.1 G/DL (ref 31.4–35)
MCHC RBC AUTO-ENTMCNC: 29.1 G/DL (ref 31.4–35)
MCHC RBC AUTO-ENTMCNC: 29.6 G/DL (ref 31.4–35)
MCHC RBC AUTO-ENTMCNC: 29.8 G/DL (ref 31.4–35)
MCHC RBC AUTO-ENTMCNC: 30 G/DL (ref 31.4–35)
MCHC RBC AUTO-ENTMCNC: 30 G/DL (ref 31.4–35)
MCHC RBC AUTO-ENTMCNC: 30.2 G/DL (ref 31.4–35)
MCHC RBC AUTO-ENTMCNC: 30.3 G/DL (ref 31.4–35)
MCHC RBC AUTO-ENTMCNC: 30.4 G/DL (ref 31.4–35)
MCHC RBC AUTO-ENTMCNC: 30.5 G/DL (ref 31.4–35)
MCHC RBC AUTO-ENTMCNC: 30.6 G/DL (ref 31.4–35)
MCHC RBC AUTO-ENTMCNC: 30.7 G/DL (ref 31.4–35)
MCHC RBC AUTO-ENTMCNC: 30.7 G/DL (ref 31.4–35)
MCV RBC AUTO: 73.7 FL (ref 79.6–97.8)
MCV RBC AUTO: 76.9 FL (ref 79.6–97.8)
MCV RBC AUTO: 77.8 FL (ref 79.6–97.8)
MCV RBC AUTO: 77.8 FL (ref 79.6–97.8)
MCV RBC AUTO: 78.8 FL (ref 79.6–97.8)
MCV RBC AUTO: 79.2 FL (ref 79.6–97.8)
MCV RBC AUTO: 79.8 FL (ref 79.6–97.8)
MCV RBC AUTO: 80.7 FL (ref 79.6–97.8)
MCV RBC AUTO: 80.9 FL (ref 79.6–97.8)
MCV RBC AUTO: 81.3 FL (ref 79.6–97.8)
MCV RBC AUTO: 81.5 FL (ref 79.6–97.8)
MCV RBC AUTO: 81.6 FL (ref 79.6–97.8)
MCV RBC AUTO: 82.5 FL (ref 79.6–97.8)
MCV RBC AUTO: 84.8 FL (ref 79.6–97.8)
MM INDURATION POC: 0 MM (ref 0–5)
MM INDURATION POC: 0 MM (ref 0–5)
MONOCYTES # BLD: 0.6 K/UL (ref 0.1–1.3)
MONOCYTES # BLD: 0.7 K/UL (ref 0.1–1.3)
MONOCYTES # BLD: 0.8 K/UL (ref 0.1–1.3)
MONOCYTES # BLD: 1 K/UL (ref 0.1–1.3)
MONOCYTES # BLD: 1.4 K/UL (ref 0.1–1.3)
MONOCYTES # BLD: 1.4 K/UL (ref 0.1–1.3)
MONOCYTES # BLD: 3.5 K/UL (ref 0.1–1.3)
MONOCYTES NFR BLD: 14 % (ref 4–12)
MONOCYTES NFR BLD: 4 % (ref 4–12)
MONOCYTES NFR BLD: 4 % (ref 4–12)
MONOCYTES NFR BLD: 5 % (ref 4–12)
MONOCYTES NFR BLD: 5 % (ref 4–12)
MONOCYTES NFR BLD: 8 % (ref 4–12)
MONOCYTES NFR BLD: 9 % (ref 4–12)
NEUTS SEG # BLD: 12.2 K/UL (ref 1.7–8.2)
NEUTS SEG # BLD: 12.8 K/UL (ref 1.7–8.2)
NEUTS SEG # BLD: 12.9 K/UL (ref 1.7–8.2)
NEUTS SEG # BLD: 13.5 K/UL (ref 1.7–8.2)
NEUTS SEG # BLD: 13.5 K/UL (ref 1.7–8.2)
NEUTS SEG # BLD: 19.9 K/UL (ref 1.7–8.2)
NEUTS SEG # BLD: 21.7 K/UL (ref 1.7–8.2)
NEUTS SEG NFR BLD: 79 % (ref 43–78)
NEUTS SEG NFR BLD: 83 % (ref 43–78)
NEUTS SEG NFR BLD: 84 % (ref 43–78)
NEUTS SEG NFR BLD: 84 % (ref 43–78)
NEUTS SEG NFR BLD: 90 % (ref 43–78)
NEUTS SEG NFR BLD: 91 % (ref 43–78)
NEUTS SEG NFR BLD: 91 % (ref 43–78)
NITRITE UR QL STRIP.AUTO: NEGATIVE
NITRITE UR QL STRIP.AUTO: NEGATIVE
NRBC # BLD: 0 K/UL (ref 0–0.2)
NRBC # BLD: 0.02 K/UL (ref 0–0.2)
NRBC # BLD: 0.03 K/UL (ref 0–0.2)
NRBC # BLD: 0.04 K/UL (ref 0–0.2)
NRBC # BLD: 0.05 K/UL (ref 0–0.2)
NRBC # BLD: 0.06 K/UL (ref 0–0.2)
O2/TOTAL GAS SETTING VFR VENT: 100 %
O2/TOTAL GAS SETTING VFR VENT: 60 %
P-R INTERVAL, ECG05: 262 MS
P-R INTERVAL, ECG05: 280 MS
PCO2 BLD: 36.3 MMHG (ref 35–45)
PCO2 BLD: 39.4 MMHG (ref 35–45)
PCO2 BLD: 46.6 MMHG (ref 35–45)
PCO2 BLD: 48.7 MMHG (ref 35–45)
PCO2 BLDV: 82.4 MMHG (ref 41–51)
PH BLD: 7.26 [PH] (ref 7.35–7.45)
PH BLD: 7.34 [PH] (ref 7.35–7.45)
PH BLD: 7.39 [PH] (ref 7.35–7.45)
PH BLD: 7.41 [PH] (ref 7.35–7.45)
PH BLDV: 7.05 [PH] (ref 7.32–7.42)
PH UR STRIP: 5.5 [PH] (ref 5–9)
PH UR STRIP: 6 [PH] (ref 5–9)
PLATELET # BLD AUTO: 195 K/UL (ref 150–450)
PLATELET # BLD AUTO: 224 K/UL (ref 150–450)
PLATELET # BLD AUTO: 224 K/UL (ref 150–450)
PLATELET # BLD AUTO: 234 K/UL (ref 150–450)
PLATELET # BLD AUTO: 240 K/UL (ref 150–450)
PLATELET # BLD AUTO: 241 K/UL (ref 150–450)
PLATELET # BLD AUTO: 241 K/UL (ref 150–450)
PLATELET # BLD AUTO: 243 K/UL (ref 150–450)
PLATELET # BLD AUTO: 255 K/UL (ref 150–450)
PLATELET # BLD AUTO: 257 K/UL (ref 150–450)
PLATELET # BLD AUTO: 262 K/UL (ref 150–450)
PLATELET # BLD AUTO: 268 K/UL (ref 150–450)
PLATELET # BLD AUTO: 283 K/UL (ref 150–450)
PLATELET # BLD AUTO: 297 K/UL (ref 150–450)
PLATELET # BLD AUTO: 341 K/UL (ref 150–450)
PLATELET # BLD AUTO: 353 K/UL (ref 150–450)
PMV BLD AUTO: 10.5 FL (ref 9.4–12.3)
PMV BLD AUTO: 10.6 FL (ref 9.4–12.3)
PMV BLD AUTO: 10.7 FL (ref 9.4–12.3)
PMV BLD AUTO: 10.7 FL (ref 9.4–12.3)
PMV BLD AUTO: 10.8 FL (ref 9.4–12.3)
PMV BLD AUTO: 10.9 FL (ref 9.4–12.3)
PMV BLD AUTO: 11.1 FL (ref 9.4–12.3)
PMV BLD AUTO: 11.1 FL (ref 9.4–12.3)
PMV BLD AUTO: 11.2 FL (ref 9.4–12.3)
PMV BLD AUTO: 11.3 FL (ref 9.4–12.3)
PMV BLD AUTO: 11.3 FL (ref 9.4–12.3)
PMV BLD AUTO: 11.5 FL (ref 9.4–12.3)
PMV BLD AUTO: 11.5 FL (ref 9.4–12.3)
PO2 BLD: 126 MMHG (ref 75–100)
PO2 BLD: 197 MMHG (ref 75–100)
PO2 BLD: 50 MMHG (ref 75–100)
PO2 BLD: 87 MMHG (ref 75–100)
PO2 BLDV: 17 MMHG
POTASSIUM SERPL-SCNC: 4.4 MMOL/L (ref 3.5–5.1)
POTASSIUM SERPL-SCNC: 4.5 MMOL/L (ref 3.5–5.1)
POTASSIUM SERPL-SCNC: 4.6 MMOL/L (ref 3.5–5.1)
POTASSIUM SERPL-SCNC: 4.7 MMOL/L (ref 3.5–5.1)
POTASSIUM SERPL-SCNC: 4.9 MMOL/L (ref 3.5–5.1)
POTASSIUM SERPL-SCNC: 4.9 MMOL/L (ref 3.5–5.1)
POTASSIUM SERPL-SCNC: 5.3 MMOL/L (ref 3.5–5.1)
POTASSIUM SERPL-SCNC: 5.4 MMOL/L (ref 3.5–5.1)
POTASSIUM SERPL-SCNC: 5.5 MMOL/L (ref 3.5–5.1)
POTASSIUM SERPL-SCNC: 5.9 MMOL/L (ref 3.5–5.1)
PPD POC: NEGATIVE NEGATIVE
PPD POC: NEGATIVE NEGATIVE
PROT SERPL-MCNC: 5 G/DL (ref 6.3–8.2)
PROT SERPL-MCNC: 5.3 G/DL (ref 6.3–8.2)
PROT SERPL-MCNC: 5.4 G/DL (ref 6.3–8.2)
PROT SERPL-MCNC: 5.6 G/DL (ref 6.3–8.2)
PROT SERPL-MCNC: 5.8 G/DL (ref 6.3–8.2)
PROT SERPL-MCNC: 5.9 G/DL (ref 6.3–8.2)
PROT SERPL-MCNC: 6 G/DL (ref 6.3–8.2)
PROT SERPL-MCNC: 6 G/DL (ref 6.3–8.2)
PROT UR STRIP-MCNC: 100 MG/DL
PROT UR STRIP-MCNC: 30 MG/DL
PROTHROMBIN TIME: 14.3 SEC (ref 11.7–14.5)
PROTHROMBIN TIME: 18.7 SEC (ref 11.7–14.5)
PROTHROMBIN TIME: 18.8 SEC (ref 11.7–14.5)
Q-T INTERVAL, ECG07: 438 MS
Q-T INTERVAL, ECG07: 490 MS
QRS DURATION, ECG06: 130 MS
QRS DURATION, ECG06: 186 MS
QTC CALCULATION (BEZET), ECG08: 511 MS
QTC CALCULATION (BEZET), ECG08: 558 MS
RBC # BLD AUTO: 2.93 M/UL (ref 4.23–5.6)
RBC # BLD AUTO: 3.15 M/UL (ref 4.23–5.6)
RBC # BLD AUTO: 3.34 M/UL (ref 4.23–5.6)
RBC # BLD AUTO: 3.41 M/UL (ref 4.23–5.6)
RBC # BLD AUTO: 3.42 M/UL (ref 4.23–5.6)
RBC # BLD AUTO: 3.43 M/UL (ref 4.23–5.6)
RBC # BLD AUTO: 3.48 M/UL (ref 4.23–5.6)
RBC # BLD AUTO: 3.49 M/UL (ref 4.23–5.6)
RBC # BLD AUTO: 3.66 M/UL (ref 4.23–5.6)
RBC # BLD AUTO: 3.68 M/UL (ref 4.23–5.6)
RBC # BLD AUTO: 3.69 M/UL (ref 4.23–5.6)
RBC # BLD AUTO: 3.73 M/UL (ref 4.23–5.6)
RBC # BLD AUTO: 3.73 M/UL (ref 4.23–5.6)
RBC # BLD AUTO: 4.34 M/UL (ref 4.23–5.6)
RBC # BLD AUTO: 4.59 M/UL (ref 4.23–5.6)
RBC # BLD AUTO: 4.71 M/UL (ref 4.23–5.6)
RBC #/AREA URNS HPF: ABNORMAL /HPF
RBC #/AREA URNS HPF: ABNORMAL /HPF
SAO2 % BLD: 100 % (ref 95–98)
SAO2 % BLD: 79 % (ref 95–98)
SAO2 % BLD: 97 % (ref 95–98)
SAO2 % BLD: 99 % (ref 95–98)
SAO2 % BLDV: 13 % (ref 65–88)
SERVICE CMNT-IMP: ABNORMAL
SERVICE CMNT-IMP: NORMAL
SODIUM SERPL-SCNC: 136 MMOL/L (ref 136–145)
SODIUM SERPL-SCNC: 137 MMOL/L (ref 136–145)
SODIUM SERPL-SCNC: 137 MMOL/L (ref 136–145)
SODIUM SERPL-SCNC: 138 MMOL/L (ref 136–145)
SODIUM SERPL-SCNC: 139 MMOL/L (ref 136–145)
SODIUM SERPL-SCNC: 140 MMOL/L (ref 136–145)
SODIUM SERPL-SCNC: 144 MMOL/L (ref 136–145)
SP GR UR REFRACTOMETRY: 1.01 (ref 1–1.02)
SP GR UR REFRACTOMETRY: 1.01 (ref 1–1.02)
SPECIMEN EXP DATE BLD: NORMAL
SPECIMEN EXP DATE BLD: NORMAL
SPECIMEN TYPE: ABNORMAL
SPECIMEN TYPE: NORMAL
STATUS OF UNIT,%ST: NORMAL
UNIT DIVISION, %UDIV: 0
UROBILINOGEN UR QL STRIP.AUTO: 0.2 EU/DL (ref 0.2–1)
UROBILINOGEN UR QL STRIP.AUTO: 1 EU/DL (ref 0.2–1)
VENTRICULAR RATE, ECG03: 78 BPM
VENTRICULAR RATE, ECG03: 82 BPM
WBC # BLD AUTO: 10.9 K/UL (ref 4.3–11.1)
WBC # BLD AUTO: 11.5 K/UL (ref 4.3–11.1)
WBC # BLD AUTO: 12.1 K/UL (ref 4.3–11.1)
WBC # BLD AUTO: 13 K/UL (ref 4.3–11.1)
WBC # BLD AUTO: 14.3 K/UL (ref 4.3–11.1)
WBC # BLD AUTO: 14.4 K/UL (ref 4.3–11.1)
WBC # BLD AUTO: 14.5 K/UL (ref 4.3–11.1)
WBC # BLD AUTO: 14.8 K/UL (ref 4.3–11.1)
WBC # BLD AUTO: 15.2 K/UL (ref 4.3–11.1)
WBC # BLD AUTO: 15.9 K/UL (ref 4.3–11.1)
WBC # BLD AUTO: 16.2 K/UL (ref 4.3–11.1)
WBC # BLD AUTO: 17 K/UL (ref 4.3–11.1)
WBC # BLD AUTO: 23.8 K/UL (ref 4.3–11.1)
WBC # BLD AUTO: 25.3 K/UL (ref 4.3–11.1)
WBC # BLD AUTO: 25.4 K/UL (ref 4.3–11.1)
WBC # BLD AUTO: 8.8 K/UL (ref 4.3–11.1)
WBC URNS QL MICRO: ABNORMAL /HPF
WBC URNS QL MICRO: ABNORMAL /HPF

## 2020-01-01 PROCEDURE — 97112 NEUROMUSCULAR REEDUCATION: CPT

## 2020-01-01 PROCEDURE — 36592 COLLECT BLOOD FROM PICC: CPT

## 2020-01-01 PROCEDURE — 74011000258 HC RX REV CODE- 258: Performed by: INTERNAL MEDICINE

## 2020-01-01 PROCEDURE — 77030019605

## 2020-01-01 PROCEDURE — 74011250637 HC RX REV CODE- 250/637: Performed by: FAMILY MEDICINE

## 2020-01-01 PROCEDURE — 82962 GLUCOSE BLOOD TEST: CPT

## 2020-01-01 PROCEDURE — 74011636637 HC RX REV CODE- 636/637: Performed by: HOSPITALIST

## 2020-01-01 PROCEDURE — 74011250637 HC RX REV CODE- 250/637: Performed by: SURGERY

## 2020-01-01 PROCEDURE — 77010033678 HC OXYGEN DAILY

## 2020-01-01 PROCEDURE — 74011250637 HC RX REV CODE- 250/637: Performed by: INTERNAL MEDICINE

## 2020-01-01 PROCEDURE — 97530 THERAPEUTIC ACTIVITIES: CPT

## 2020-01-01 PROCEDURE — 85027 COMPLETE CBC AUTOMATED: CPT

## 2020-01-01 PROCEDURE — 82803 BLOOD GASES ANY COMBINATION: CPT

## 2020-01-01 PROCEDURE — 30233N1 TRANSFUSION OF NONAUTOLOGOUS RED BLOOD CELLS INTO PERIPHERAL VEIN, PERCUTANEOUS APPROACH: ICD-10-PCS | Performed by: INTERNAL MEDICINE

## 2020-01-01 PROCEDURE — 85610 PROTHROMBIN TIME: CPT

## 2020-01-01 PROCEDURE — 85018 HEMOGLOBIN: CPT

## 2020-01-01 PROCEDURE — 80053 COMPREHEN METABOLIC PANEL: CPT

## 2020-01-01 PROCEDURE — 87150 DNA/RNA AMPLIFIED PROBE: CPT

## 2020-01-01 PROCEDURE — 94760 N-INVAS EAR/PLS OXIMETRY 1: CPT

## 2020-01-01 PROCEDURE — C8929 TTE W OR WO FOL WCON,DOPPLER: HCPCS

## 2020-01-01 PROCEDURE — 74011636637 HC RX REV CODE- 636/637: Performed by: INTERNAL MEDICINE

## 2020-01-01 PROCEDURE — 77030040830 HC CATH URETH FOL MDII -A

## 2020-01-01 PROCEDURE — 74011250636 HC RX REV CODE- 250/636: Performed by: INTERNAL MEDICINE

## 2020-01-01 PROCEDURE — 65270000029 HC RM PRIVATE

## 2020-01-01 PROCEDURE — 36430 TRANSFUSION BLD/BLD COMPNT: CPT

## 2020-01-01 PROCEDURE — 96361 HYDRATE IV INFUSION ADD-ON: CPT | Performed by: EMERGENCY MEDICINE

## 2020-01-01 PROCEDURE — 36600 WITHDRAWAL OF ARTERIAL BLOOD: CPT

## 2020-01-01 PROCEDURE — 74011000250 HC RX REV CODE- 250: Performed by: FAMILY MEDICINE

## 2020-01-01 PROCEDURE — 80048 BASIC METABOLIC PNL TOTAL CA: CPT

## 2020-01-01 PROCEDURE — 97110 THERAPEUTIC EXERCISES: CPT

## 2020-01-01 PROCEDURE — 97165 OT EVAL LOW COMPLEX 30 MIN: CPT

## 2020-01-01 PROCEDURE — 82728 ASSAY OF FERRITIN: CPT

## 2020-01-01 PROCEDURE — 86923 COMPATIBILITY TEST ELECTRIC: CPT

## 2020-01-01 PROCEDURE — 74011250637 HC RX REV CODE- 250/637: Performed by: HOSPITALIST

## 2020-01-01 PROCEDURE — 77030040361 HC SLV COMPR DVT MDII -B

## 2020-01-01 PROCEDURE — 36415 COLL VENOUS BLD VENIPUNCTURE: CPT

## 2020-01-01 PROCEDURE — 85730 THROMBOPLASTIN TIME PARTIAL: CPT

## 2020-01-01 PROCEDURE — 71045 X-RAY EXAM CHEST 1 VIEW: CPT

## 2020-01-01 PROCEDURE — 84132 ASSAY OF SERUM POTASSIUM: CPT

## 2020-01-01 PROCEDURE — 74011000302 HC RX REV CODE- 302: Performed by: INTERNAL MEDICINE

## 2020-01-01 PROCEDURE — 74011000250 HC RX REV CODE- 250

## 2020-01-01 PROCEDURE — 77030027138 HC INCENT SPIROMETER -A

## 2020-01-01 PROCEDURE — 5A09357 ASSISTANCE WITH RESPIRATORY VENTILATION, LESS THAN 24 CONSECUTIVE HOURS, CONTINUOUS POSITIVE AIRWAY PRESSURE: ICD-10-PCS | Performed by: HOSPITALIST

## 2020-01-01 PROCEDURE — 93005 ELECTROCARDIOGRAM TRACING: CPT | Performed by: EMERGENCY MEDICINE

## 2020-01-01 PROCEDURE — 36556 INSERT NON-TUNNEL CV CATH: CPT

## 2020-01-01 PROCEDURE — 74011250636 HC RX REV CODE- 250/636: Performed by: HOSPITALIST

## 2020-01-01 PROCEDURE — 77030020263 HC SOL INJ SOD CL0.9% LFCR 1000ML

## 2020-01-01 PROCEDURE — 94660 CPAP INITIATION&MGMT: CPT

## 2020-01-01 PROCEDURE — 83735 ASSAY OF MAGNESIUM: CPT

## 2020-01-01 PROCEDURE — 85025 COMPLETE CBC W/AUTO DIFF WBC: CPT

## 2020-01-01 PROCEDURE — 83605 ASSAY OF LACTIC ACID: CPT

## 2020-01-01 PROCEDURE — 87088 URINE BACTERIA CULTURE: CPT

## 2020-01-01 PROCEDURE — 70450 CT HEAD/BRAIN W/O DYE: CPT

## 2020-01-01 PROCEDURE — 74011000250 HC RX REV CODE- 250: Performed by: HOSPITALIST

## 2020-01-01 PROCEDURE — 86900 BLOOD TYPING SEROLOGIC ABO: CPT

## 2020-01-01 PROCEDURE — 74011250636 HC RX REV CODE- 250/636: Performed by: SURGERY

## 2020-01-01 PROCEDURE — 87205 SMEAR GRAM STAIN: CPT

## 2020-01-01 PROCEDURE — 71260 CT THORAX DX C+: CPT

## 2020-01-01 PROCEDURE — 96375 TX/PRO/DX INJ NEW DRUG ADDON: CPT | Performed by: EMERGENCY MEDICINE

## 2020-01-01 PROCEDURE — 74011250636 HC RX REV CODE- 250/636: Performed by: FAMILY MEDICINE

## 2020-01-01 PROCEDURE — 83540 ASSAY OF IRON: CPT

## 2020-01-01 PROCEDURE — 65610000001 HC ROOM ICU GENERAL

## 2020-01-01 PROCEDURE — C1751 CATH, INF, PER/CENT/MIDLINE: HCPCS

## 2020-01-01 PROCEDURE — 82947 ASSAY GLUCOSE BLOOD QUANT: CPT

## 2020-01-01 PROCEDURE — 94002 VENT MGMT INPAT INIT DAY: CPT

## 2020-01-01 PROCEDURE — 97162 PT EVAL MOD COMPLEX 30 MIN: CPT

## 2020-01-01 PROCEDURE — 87186 SC STD MICRODIL/AGAR DIL: CPT

## 2020-01-01 PROCEDURE — 74011000250 HC RX REV CODE- 250: Performed by: INTERNAL MEDICINE

## 2020-01-01 PROCEDURE — 97116 GAIT TRAINING THERAPY: CPT

## 2020-01-01 PROCEDURE — 87077 CULTURE AEROBIC IDENTIFY: CPT

## 2020-01-01 PROCEDURE — 86580 TB INTRADERMAL TEST: CPT | Performed by: INTERNAL MEDICINE

## 2020-01-01 PROCEDURE — 74011250636 HC RX REV CODE- 250/636: Performed by: EMERGENCY MEDICINE

## 2020-01-01 PROCEDURE — 3E033XZ INTRODUCTION OF VASOPRESSOR INTO PERIPHERAL VEIN, PERCUTANEOUS APPROACH: ICD-10-PCS | Performed by: HOSPITALIST

## 2020-01-01 PROCEDURE — 97535 SELF CARE MNGMENT TRAINING: CPT

## 2020-01-01 PROCEDURE — 77030019905 HC CATH URETH INTMIT MDII -A

## 2020-01-01 PROCEDURE — 99285 EMERGENCY DEPT VISIT HI MDM: CPT | Performed by: EMERGENCY MEDICINE

## 2020-01-01 PROCEDURE — 87086 URINE CULTURE/COLONY COUNT: CPT

## 2020-01-01 PROCEDURE — 74011000258 HC RX REV CODE- 258: Performed by: EMERGENCY MEDICINE

## 2020-01-01 PROCEDURE — 05H633Z INSERTION OF INFUSION DEVICE INTO LEFT SUBCLAVIAN VEIN, PERCUTANEOUS APPROACH: ICD-10-PCS | Performed by: INTERNAL MEDICINE

## 2020-01-01 PROCEDURE — 93970 EXTREMITY STUDY: CPT

## 2020-01-01 PROCEDURE — P9016 RBC LEUKOCYTES REDUCED: HCPCS

## 2020-01-01 PROCEDURE — 77030005537 HC CATH URETH BARD -A

## 2020-01-01 PROCEDURE — 74011000258 HC RX REV CODE- 258: Performed by: HOSPITALIST

## 2020-01-01 PROCEDURE — 96374 THER/PROPH/DIAG INJ IV PUSH: CPT | Performed by: EMERGENCY MEDICINE

## 2020-01-01 PROCEDURE — 83880 ASSAY OF NATRIURETIC PEPTIDE: CPT

## 2020-01-01 PROCEDURE — 31500 INSERT EMERGENCY AIRWAY: CPT

## 2020-01-01 PROCEDURE — 74011636320 HC RX REV CODE- 636/320: Performed by: EMERGENCY MEDICINE

## 2020-01-01 PROCEDURE — 74011000258 HC RX REV CODE- 258: Performed by: FAMILY MEDICINE

## 2020-01-01 PROCEDURE — 51798 US URINE CAPACITY MEASURE: CPT

## 2020-01-01 PROCEDURE — 65620000000 HC RM CCU GENERAL

## 2020-01-01 PROCEDURE — 77030020256 HC SOL INJ NACL 0.9%  500ML

## 2020-01-01 PROCEDURE — 73562 X-RAY EXAM OF KNEE 3: CPT

## 2020-01-01 PROCEDURE — 94640 AIRWAY INHALATION TREATMENT: CPT

## 2020-01-01 PROCEDURE — 81001 URINALYSIS AUTO W/SCOPE: CPT

## 2020-01-01 PROCEDURE — 97164 PT RE-EVAL EST PLAN CARE: CPT

## 2020-01-01 PROCEDURE — 87040 BLOOD CULTURE FOR BACTERIA: CPT

## 2020-01-01 RX ORDER — FUROSEMIDE 10 MG/ML
40 INJECTION INTRAMUSCULAR; INTRAVENOUS ONCE
Status: COMPLETED | OUTPATIENT
Start: 2020-01-01 | End: 2020-01-01

## 2020-01-01 RX ORDER — HYDROCORTISONE SODIUM SUCCINATE 100 MG/2ML
50 INJECTION, POWDER, FOR SOLUTION INTRAMUSCULAR; INTRAVENOUS EVERY 8 HOURS
Status: DISCONTINUED | OUTPATIENT
Start: 2020-01-01 | End: 2020-01-01

## 2020-01-01 RX ORDER — INSULIN LISPRO 100 [IU]/ML
5 INJECTION, SOLUTION INTRAVENOUS; SUBCUTANEOUS
Status: DISCONTINUED | OUTPATIENT
Start: 2020-01-01 | End: 2020-01-01

## 2020-01-01 RX ORDER — NOREPINEPHRINE BITARTRATE/D5W 4MG/250ML
.5-16 PLASTIC BAG, INJECTION (ML) INTRAVENOUS
Status: DISCONTINUED | OUTPATIENT
Start: 2020-01-01 | End: 2020-01-01

## 2020-01-01 RX ORDER — SODIUM CHLORIDE 0.9 % (FLUSH) 0.9 %
10 SYRINGE (ML) INJECTION
Status: COMPLETED | OUTPATIENT
Start: 2020-01-01 | End: 2020-01-01

## 2020-01-01 RX ORDER — ONDANSETRON 2 MG/ML
4 INJECTION INTRAMUSCULAR; INTRAVENOUS
Status: COMPLETED | OUTPATIENT
Start: 2020-01-01 | End: 2020-01-01

## 2020-01-01 RX ORDER — NYSTATIN 100000 [USP'U]/G
POWDER TOPICAL 2 TIMES DAILY
Status: DISCONTINUED | OUTPATIENT
Start: 2020-01-01 | End: 2020-01-01 | Stop reason: HOSPADM

## 2020-01-01 RX ORDER — GABAPENTIN 100 MG/1
100 CAPSULE ORAL EVERY 12 HOURS
Status: DISCONTINUED | OUTPATIENT
Start: 2020-01-01 | End: 2020-01-01

## 2020-01-01 RX ORDER — SODIUM CHLORIDE 9 MG/ML
250 INJECTION, SOLUTION INTRAVENOUS AS NEEDED
Status: DISCONTINUED | OUTPATIENT
Start: 2020-01-01 | End: 2020-01-01 | Stop reason: SDUPTHER

## 2020-01-01 RX ORDER — GABAPENTIN 100 MG/1
200 CAPSULE ORAL EVERY 12 HOURS
Status: DISCONTINUED | OUTPATIENT
Start: 2020-01-01 | End: 2020-01-01

## 2020-01-01 RX ORDER — FUROSEMIDE 20 MG/1
20 TABLET ORAL DAILY
Status: DISCONTINUED | OUTPATIENT
Start: 2020-01-01 | End: 2020-01-01

## 2020-01-01 RX ORDER — CALCIUM CHLORIDE INJECTION 100 MG/ML
INJECTION, SOLUTION INTRAVENOUS
Status: COMPLETED | OUTPATIENT
Start: 2020-01-01 | End: 2020-01-01

## 2020-01-01 RX ORDER — FUROSEMIDE 10 MG/ML
40 INJECTION INTRAMUSCULAR; INTRAVENOUS DAILY
Status: DISCONTINUED | OUTPATIENT
Start: 2020-01-01 | End: 2020-01-01

## 2020-01-01 RX ORDER — MAGNESIUM SULFATE HEPTAHYDRATE 40 MG/ML
2 INJECTION, SOLUTION INTRAVENOUS EVERY 4 HOURS
Status: COMPLETED | OUTPATIENT
Start: 2020-01-01 | End: 2020-01-01

## 2020-01-01 RX ORDER — EPINEPHRINE 0.1 MG/ML
INJECTION INTRACARDIAC; INTRAVENOUS
Status: COMPLETED | OUTPATIENT
Start: 2020-01-01 | End: 2020-01-01

## 2020-01-01 RX ORDER — SOTALOL HYDROCHLORIDE 80 MG/1
80 TABLET ORAL EVERY 12 HOURS
Status: DISCONTINUED | OUTPATIENT
Start: 2020-01-01 | End: 2020-01-01 | Stop reason: HOSPADM

## 2020-01-01 RX ORDER — HALOPERIDOL 5 MG/ML
5 INJECTION INTRAMUSCULAR ONCE
Status: COMPLETED | OUTPATIENT
Start: 2020-01-01 | End: 2020-01-01

## 2020-01-01 RX ORDER — IPRATROPIUM BROMIDE AND ALBUTEROL SULFATE 2.5; .5 MG/3ML; MG/3ML
3 SOLUTION RESPIRATORY (INHALATION)
Status: DISCONTINUED | OUTPATIENT
Start: 2020-01-01 | End: 2020-01-01 | Stop reason: HOSPADM

## 2020-01-01 RX ORDER — FAMOTIDINE 20 MG/1
20 TABLET, FILM COATED ORAL 2 TIMES DAILY
Status: DISCONTINUED | OUTPATIENT
Start: 2020-01-01 | End: 2020-01-01

## 2020-01-01 RX ORDER — PROBENECID 500 MG/1
500 TABLET, FILM COATED ORAL EVERY 12 HOURS
Status: DISCONTINUED | OUTPATIENT
Start: 2020-01-01 | End: 2020-01-01

## 2020-01-01 RX ORDER — TRAMADOL HYDROCHLORIDE 50 MG/1
50 TABLET ORAL
Status: DISCONTINUED | OUTPATIENT
Start: 2020-01-01 | End: 2020-01-01 | Stop reason: HOSPADM

## 2020-01-01 RX ORDER — HYDROCORTISONE SODIUM SUCCINATE 100 MG/2ML
50 INJECTION, POWDER, FOR SOLUTION INTRAMUSCULAR; INTRAVENOUS EVERY 12 HOURS
Status: COMPLETED | OUTPATIENT
Start: 2020-01-01 | End: 2020-01-01

## 2020-01-01 RX ORDER — GABAPENTIN 300 MG/1
300 CAPSULE ORAL EVERY 12 HOURS
Status: DISCONTINUED | OUTPATIENT
Start: 2020-01-01 | End: 2020-01-01

## 2020-01-01 RX ORDER — SOTALOL HYDROCHLORIDE 80 MG/1
120 TABLET ORAL EVERY 12 HOURS
Status: DISCONTINUED | OUTPATIENT
Start: 2020-01-01 | End: 2020-01-01

## 2020-01-01 RX ORDER — SODIUM BICARBONATE 1 MEQ/ML
SYRINGE (ML) INTRAVENOUS
Status: COMPLETED | OUTPATIENT
Start: 2020-01-01 | End: 2020-01-01

## 2020-01-01 RX ORDER — HYDROCODONE BITARTRATE AND ACETAMINOPHEN 7.5; 325 MG/1; MG/1
1 TABLET ORAL
Status: DISCONTINUED | OUTPATIENT
Start: 2020-01-01 | End: 2020-01-01

## 2020-01-01 RX ORDER — LORAZEPAM 1 MG/1
1 TABLET ORAL
Status: DISCONTINUED | OUTPATIENT
Start: 2020-01-01 | End: 2020-01-01

## 2020-01-01 RX ORDER — MORPHINE SULFATE 10 MG/ML
2 INJECTION, SOLUTION INTRAMUSCULAR; INTRAVENOUS
Status: DISCONTINUED | OUTPATIENT
Start: 2020-01-01 | End: 2020-01-01 | Stop reason: SDUPTHER

## 2020-01-01 RX ORDER — SODIUM CHLORIDE 9 MG/ML
250 INJECTION, SOLUTION INTRAVENOUS AS NEEDED
Status: DISCONTINUED | OUTPATIENT
Start: 2020-01-01 | End: 2020-01-01

## 2020-01-01 RX ORDER — HYDROCORTISONE SODIUM SUCCINATE 100 MG/2ML
50 INJECTION, POWDER, FOR SOLUTION INTRAMUSCULAR; INTRAVENOUS ONCE
Status: COMPLETED | OUTPATIENT
Start: 2020-01-01 | End: 2020-01-01

## 2020-01-01 RX ORDER — INSULIN GLARGINE 100 [IU]/ML
5 INJECTION, SOLUTION SUBCUTANEOUS
Status: DISCONTINUED | OUTPATIENT
Start: 2020-01-01 | End: 2020-01-01

## 2020-01-01 RX ORDER — HYDROCODONE BITARTRATE AND ACETAMINOPHEN 5; 325 MG/1; MG/1
1 TABLET ORAL
Status: DISCONTINUED | OUTPATIENT
Start: 2020-01-01 | End: 2020-01-01 | Stop reason: HOSPADM

## 2020-01-01 RX ORDER — TAMSULOSIN HYDROCHLORIDE 0.4 MG/1
0.4 CAPSULE ORAL DAILY
Status: DISCONTINUED | OUTPATIENT
Start: 2020-01-01 | End: 2020-01-01

## 2020-01-01 RX ORDER — INSULIN LISPRO 100 [IU]/ML
INJECTION, SOLUTION INTRAVENOUS; SUBCUTANEOUS
Status: DISCONTINUED | OUTPATIENT
Start: 2020-01-01 | End: 2020-01-01 | Stop reason: HOSPADM

## 2020-01-01 RX ORDER — MORPHINE SULFATE 10 MG/ML
2-6 INJECTION, SOLUTION INTRAMUSCULAR; INTRAVENOUS
Status: DISCONTINUED | OUTPATIENT
Start: 2020-01-01 | End: 2020-01-01

## 2020-01-01 RX ORDER — ACETAMINOPHEN 325 MG/1
650 TABLET ORAL
Status: DISCONTINUED | OUTPATIENT
Start: 2020-01-01 | End: 2020-01-01 | Stop reason: HOSPADM

## 2020-01-01 RX ORDER — MORPHINE SULFATE 2 MG/ML
2 INJECTION, SOLUTION INTRAMUSCULAR; INTRAVENOUS
Status: DISCONTINUED | OUTPATIENT
Start: 2020-01-01 | End: 2020-01-01

## 2020-01-01 RX ORDER — NOREPINEPHRINE BITARTRATE/D5W 4MG/250ML
PLASTIC BAG, INJECTION (ML) INTRAVENOUS
Status: COMPLETED
Start: 2020-01-01 | End: 2020-01-01

## 2020-01-01 RX ORDER — ONDANSETRON 2 MG/ML
4 INJECTION INTRAMUSCULAR; INTRAVENOUS
Status: DISCONTINUED | OUTPATIENT
Start: 2020-01-01 | End: 2020-01-01 | Stop reason: HOSPADM

## 2020-01-01 RX ORDER — CIPROFLOXACIN 500 MG/1
500 TABLET ORAL EVERY 12 HOURS
Status: DISCONTINUED | OUTPATIENT
Start: 2020-01-01 | End: 2020-01-01

## 2020-01-01 RX ORDER — FUROSEMIDE 10 MG/ML
40 INJECTION INTRAMUSCULAR; INTRAVENOUS EVERY 12 HOURS
Status: DISCONTINUED | OUTPATIENT
Start: 2020-01-01 | End: 2020-01-01

## 2020-01-01 RX ORDER — SODIUM CHLORIDE 9 MG/ML
50 INJECTION, SOLUTION INTRAVENOUS CONTINUOUS
Status: DISCONTINUED | OUTPATIENT
Start: 2020-01-01 | End: 2020-01-01

## 2020-01-01 RX ORDER — SODIUM CHLORIDE 9 MG/ML
75 INJECTION, SOLUTION INTRAVENOUS CONTINUOUS
Status: DISPENSED | OUTPATIENT
Start: 2020-01-01 | End: 2020-01-01

## 2020-01-01 RX ORDER — PHYTONADIONE 5 MG/1
5 TABLET ORAL
Status: COMPLETED | OUTPATIENT
Start: 2020-01-01 | End: 2020-01-01

## 2020-01-01 RX ORDER — TRANEXAMIC ACID 100 MG/ML
10 INJECTION, SOLUTION INTRAVENOUS ONCE
Status: COMPLETED | OUTPATIENT
Start: 2020-01-01 | End: 2020-01-01

## 2020-01-01 RX ORDER — CEFPODOXIME PROXETIL 200 MG/1
200 TABLET, FILM COATED ORAL EVERY 12 HOURS
Status: DISCONTINUED | OUTPATIENT
Start: 2020-01-01 | End: 2020-01-01

## 2020-01-01 RX ORDER — SODIUM CHLORIDE 9 MG/ML
250 INJECTION, SOLUTION INTRAVENOUS AS NEEDED
Status: DISCONTINUED | OUTPATIENT
Start: 2020-01-01 | End: 2020-01-01 | Stop reason: HOSPADM

## 2020-01-01 RX ORDER — MORPHINE SULFATE 4 MG/ML
4 INJECTION INTRAVENOUS
Status: COMPLETED | OUTPATIENT
Start: 2020-01-01 | End: 2020-01-01

## 2020-01-01 RX ORDER — SODIUM CHLORIDE 9 MG/ML
100 INJECTION, SOLUTION INTRAVENOUS CONTINUOUS
Status: DISCONTINUED | OUTPATIENT
Start: 2020-01-01 | End: 2020-01-01

## 2020-01-01 RX ORDER — HYDROCORTISONE SODIUM SUCCINATE 100 MG/2ML
100 INJECTION, POWDER, FOR SOLUTION INTRAMUSCULAR; INTRAVENOUS EVERY 8 HOURS
Status: DISCONTINUED | OUTPATIENT
Start: 2020-01-01 | End: 2020-01-01

## 2020-01-01 RX ORDER — VANCOMYCIN 2 GRAM/500 ML IN 0.9 % SODIUM CHLORIDE INTRAVENOUS
2000
Status: DISCONTINUED | OUTPATIENT
Start: 2020-01-01 | End: 2020-01-01 | Stop reason: HOSPADM

## 2020-01-01 RX ADMIN — LORAZEPAM 1 MG: 1 TABLET ORAL at 22:14

## 2020-01-01 RX ADMIN — LORAZEPAM 1 MG: 1 TABLET ORAL at 01:39

## 2020-01-01 RX ADMIN — HYDROCODONE BITARTRATE AND ACETAMINOPHEN 1 TABLET: 7.5; 325 TABLET ORAL at 17:47

## 2020-01-01 RX ADMIN — NYSTATIN: 100000 POWDER TOPICAL at 16:41

## 2020-01-01 RX ADMIN — PROBENECID 500 MG: 500 TABLET, FILM COATED ORAL at 09:04

## 2020-01-01 RX ADMIN — INSULIN LISPRO 3 UNITS: 100 INJECTION, SOLUTION INTRAVENOUS; SUBCUTANEOUS at 15:55

## 2020-01-01 RX ADMIN — Medication 12 MCG/MIN: at 04:28

## 2020-01-01 RX ADMIN — FAMOTIDINE 20 MG: 10 INJECTION INTRAVENOUS at 08:39

## 2020-01-01 RX ADMIN — INSULIN LISPRO 5 UNITS: 100 INJECTION, SOLUTION INTRAVENOUS; SUBCUTANEOUS at 16:58

## 2020-01-01 RX ADMIN — ACETAMINOPHEN 650 MG: 325 TABLET, FILM COATED ORAL at 00:23

## 2020-01-01 RX ADMIN — INSULIN LISPRO 3 UNITS: 100 INJECTION, SOLUTION INTRAVENOUS; SUBCUTANEOUS at 09:18

## 2020-01-01 RX ADMIN — PROBENECID 500 MG: 500 TABLET, FILM COATED ORAL at 21:31

## 2020-01-01 RX ADMIN — GABAPENTIN 300 MG: 300 CAPSULE ORAL at 21:32

## 2020-01-01 RX ADMIN — PROBENECID 500 MG: 500 TABLET, FILM COATED ORAL at 22:37

## 2020-01-01 RX ADMIN — HALOPERIDOL LACTATE 5 MG: 5 INJECTION INTRAMUSCULAR at 03:47

## 2020-01-01 RX ADMIN — SODIUM CHLORIDE 1000 ML: 900 INJECTION, SOLUTION INTRAVENOUS at 16:40

## 2020-01-01 RX ADMIN — SOTALOL HYDROCHLORIDE 120 MG: 80 TABLET ORAL at 20:26

## 2020-01-01 RX ADMIN — NYSTATIN: 100000 POWDER TOPICAL at 09:00

## 2020-01-01 RX ADMIN — EPINEPHRINE 1 MG: 0.1 INJECTION, SOLUTION ENDOTRACHEAL; INTRACARDIAC; INTRAVENOUS at 04:26

## 2020-01-01 RX ADMIN — SOTALOL HYDROCHLORIDE 120 MG: 80 TABLET ORAL at 10:33

## 2020-01-01 RX ADMIN — FAMOTIDINE 20 MG: 20 TABLET, FILM COATED ORAL at 17:34

## 2020-01-01 RX ADMIN — VANCOMYCIN HYDROCHLORIDE 2500 MG: 10 INJECTION, POWDER, LYOPHILIZED, FOR SOLUTION INTRAVENOUS at 16:09

## 2020-01-01 RX ADMIN — INSULIN LISPRO 3 UNITS: 100 INJECTION, SOLUTION INTRAVENOUS; SUBCUTANEOUS at 17:47

## 2020-01-01 RX ADMIN — INSULIN GLARGINE 5 UNITS: 100 INJECTION, SOLUTION SUBCUTANEOUS at 21:33

## 2020-01-01 RX ADMIN — INSULIN LISPRO 3 UNITS: 100 INJECTION, SOLUTION INTRAVENOUS; SUBCUTANEOUS at 16:47

## 2020-01-01 RX ADMIN — TRAMADOL HYDROCHLORIDE 50 MG: 50 TABLET, FILM COATED ORAL at 00:07

## 2020-01-01 RX ADMIN — FAMOTIDINE 20 MG: 10 INJECTION INTRAVENOUS at 21:40

## 2020-01-01 RX ADMIN — GABAPENTIN 300 MG: 300 CAPSULE ORAL at 20:52

## 2020-01-01 RX ADMIN — PROBENECID 500 MG: 500 TABLET, FILM COATED ORAL at 08:44

## 2020-01-01 RX ADMIN — EPINEPHRINE 1 MG: 0.1 INJECTION, SOLUTION ENDOTRACHEAL; INTRACARDIAC; INTRAVENOUS at 04:54

## 2020-01-01 RX ADMIN — INSULIN LISPRO 3 UNITS: 100 INJECTION, SOLUTION INTRAVENOUS; SUBCUTANEOUS at 21:55

## 2020-01-01 RX ADMIN — PROBENECID 500 MG: 500 TABLET, FILM COATED ORAL at 08:38

## 2020-01-01 RX ADMIN — PROBENECID 500 MG: 500 TABLET, FILM COATED ORAL at 08:37

## 2020-01-01 RX ADMIN — PROBENECID 500 MG: 500 TABLET, FILM COATED ORAL at 09:17

## 2020-01-01 RX ADMIN — CEFEPIME HYDROCHLORIDE 1 G: 1 INJECTION, POWDER, FOR SOLUTION INTRAMUSCULAR; INTRAVENOUS at 08:48

## 2020-01-01 RX ADMIN — SODIUM CHLORIDE 250 ML: 900 INJECTION, SOLUTION INTRAVENOUS at 16:35

## 2020-01-01 RX ADMIN — FAMOTIDINE 20 MG: 20 TABLET, FILM COATED ORAL at 17:27

## 2020-01-01 RX ADMIN — FUROSEMIDE 40 MG: 40 INJECTION, SOLUTION INTRAMUSCULAR; INTRAVENOUS at 12:35

## 2020-01-01 RX ADMIN — FAMOTIDINE 20 MG: 20 TABLET, FILM COATED ORAL at 09:18

## 2020-01-01 RX ADMIN — EPINEPHRINE 1 MG: 0.1 INJECTION, SOLUTION ENDOTRACHEAL; INTRACARDIAC; INTRAVENOUS at 04:30

## 2020-01-01 RX ADMIN — GABAPENTIN 300 MG: 300 CAPSULE ORAL at 09:17

## 2020-01-01 RX ADMIN — GABAPENTIN 300 MG: 300 CAPSULE ORAL at 10:33

## 2020-01-01 RX ADMIN — NYSTATIN: 100000 POWDER TOPICAL at 18:00

## 2020-01-01 RX ADMIN — FUROSEMIDE 20 MG: 20 TABLET ORAL at 08:42

## 2020-01-01 RX ADMIN — SOTALOL HYDROCHLORIDE 80 MG: 80 TABLET ORAL at 20:30

## 2020-01-01 RX ADMIN — PROBENECID 500 MG: 500 TABLET, FILM COATED ORAL at 20:25

## 2020-01-01 RX ADMIN — HYDROCODONE BITARTRATE AND ACETAMINOPHEN 1 TABLET: 5; 325 TABLET ORAL at 03:50

## 2020-01-01 RX ADMIN — FAMOTIDINE 20 MG: 20 TABLET, FILM COATED ORAL at 17:26

## 2020-01-01 RX ADMIN — Medication 5 ML: at 08:16

## 2020-01-01 RX ADMIN — CEFTRIAXONE SODIUM 2 G: 2 INJECTION, POWDER, FOR SOLUTION INTRAMUSCULAR; INTRAVENOUS at 14:48

## 2020-01-01 RX ADMIN — NYSTATIN: 100000 POWDER TOPICAL at 08:38

## 2020-01-01 RX ADMIN — CEFTRIAXONE 2 G: 2 INJECTION, POWDER, FOR SOLUTION INTRAMUSCULAR; INTRAVENOUS at 08:14

## 2020-01-01 RX ADMIN — PROBENECID 500 MG: 500 TABLET, FILM COATED ORAL at 20:14

## 2020-01-01 RX ADMIN — EPINEPHRINE 1 MG: 0.1 INJECTION, SOLUTION ENDOTRACHEAL; INTRACARDIAC; INTRAVENOUS at 04:48

## 2020-01-01 RX ADMIN — SODIUM CHLORIDE 100 ML/HR: 900 INJECTION, SOLUTION INTRAVENOUS at 19:01

## 2020-01-01 RX ADMIN — HYDROCODONE BITARTRATE AND ACETAMINOPHEN 1 TABLET: 7.5; 325 TABLET ORAL at 14:55

## 2020-01-01 RX ADMIN — FAMOTIDINE 20 MG: 20 TABLET, FILM COATED ORAL at 17:36

## 2020-01-01 RX ADMIN — SODIUM CHLORIDE 100 ML/HR: 900 INJECTION, SOLUTION INTRAVENOUS at 18:00

## 2020-01-01 RX ADMIN — INSULIN LISPRO 3 UNITS: 100 INJECTION, SOLUTION INTRAVENOUS; SUBCUTANEOUS at 16:30

## 2020-01-01 RX ADMIN — GABAPENTIN 300 MG: 300 CAPSULE ORAL at 08:11

## 2020-01-01 RX ADMIN — NYSTATIN: 100000 POWDER TOPICAL at 18:03

## 2020-01-01 RX ADMIN — SOTALOL HYDROCHLORIDE 80 MG: 80 TABLET ORAL at 08:00

## 2020-01-01 RX ADMIN — GABAPENTIN 300 MG: 300 CAPSULE ORAL at 12:22

## 2020-01-01 RX ADMIN — EPINEPHRINE 1 MG: 0.1 INJECTION, SOLUTION ENDOTRACHEAL; INTRACARDIAC; INTRAVENOUS at 04:41

## 2020-01-01 RX ADMIN — GABAPENTIN 300 MG: 300 CAPSULE ORAL at 09:03

## 2020-01-01 RX ADMIN — GABAPENTIN 300 MG: 300 CAPSULE ORAL at 20:46

## 2020-01-01 RX ADMIN — GABAPENTIN 300 MG: 300 CAPSULE ORAL at 21:31

## 2020-01-01 RX ADMIN — DOPAMINE HCL 20 MCG/KG/MIN: 80 INJECTION, SOLUTION INTRAVENOUS at 05:06

## 2020-01-01 RX ADMIN — EPINEPHRINE 1 MG: 0.1 INJECTION, SOLUTION ENDOTRACHEAL; INTRACARDIAC; INTRAVENOUS at 04:44

## 2020-01-01 RX ADMIN — Medication 5 ML: at 12:00

## 2020-01-01 RX ADMIN — INSULIN LISPRO 3 UNITS: 100 INJECTION, SOLUTION INTRAVENOUS; SUBCUTANEOUS at 17:27

## 2020-01-01 RX ADMIN — PHYTONADIONE 5 MG: 5 TABLET ORAL at 08:46

## 2020-01-01 RX ADMIN — GABAPENTIN 300 MG: 300 CAPSULE ORAL at 08:41

## 2020-01-01 RX ADMIN — Medication 5 ML: at 00:00

## 2020-01-01 RX ADMIN — PROBENECID 500 MG: 500 TABLET, FILM COATED ORAL at 07:56

## 2020-01-01 RX ADMIN — GABAPENTIN 300 MG: 300 CAPSULE ORAL at 08:38

## 2020-01-01 RX ADMIN — CALCIUM CHLORIDE 1 G: 100 INJECTION, SOLUTION INTRAVENOUS; INTRAVENTRICULAR at 04:24

## 2020-01-01 RX ADMIN — NYSTATIN: 100000 POWDER TOPICAL at 08:02

## 2020-01-01 RX ADMIN — HYDROCODONE BITARTRATE AND ACETAMINOPHEN 1 TABLET: 7.5; 325 TABLET ORAL at 21:34

## 2020-01-01 RX ADMIN — ONDANSETRON 4 MG: 2 INJECTION INTRAMUSCULAR; INTRAVENOUS at 16:04

## 2020-01-01 RX ADMIN — Medication 5 ML: at 21:11

## 2020-01-01 RX ADMIN — SOTALOL HYDROCHLORIDE 120 MG: 80 TABLET ORAL at 08:38

## 2020-01-01 RX ADMIN — NYSTATIN: 100000 POWDER TOPICAL at 08:48

## 2020-01-01 RX ADMIN — INSULIN LISPRO 3 UNITS: 100 INJECTION, SOLUTION INTRAVENOUS; SUBCUTANEOUS at 08:43

## 2020-01-01 RX ADMIN — Medication 4 MCG/MIN: at 17:58

## 2020-01-01 RX ADMIN — SODIUM CHLORIDE 75 ML/HR: 900 INJECTION, SOLUTION INTRAVENOUS at 19:10

## 2020-01-01 RX ADMIN — VANCOMYCIN HYDROCHLORIDE 2500 MG: 10 INJECTION, POWDER, LYOPHILIZED, FOR SOLUTION INTRAVENOUS at 16:39

## 2020-01-01 RX ADMIN — MAGNESIUM SULFATE HEPTAHYDRATE 2 G: 40 INJECTION, SOLUTION INTRAVENOUS at 12:49

## 2020-01-01 RX ADMIN — SOTALOL HYDROCHLORIDE 120 MG: 80 TABLET ORAL at 09:17

## 2020-01-01 RX ADMIN — EPINEPHRINE 1 MG: 0.1 INJECTION, SOLUTION ENDOTRACHEAL; INTRACARDIAC; INTRAVENOUS at 04:51

## 2020-01-01 RX ADMIN — PROBENECID 500 MG: 500 TABLET, FILM COATED ORAL at 21:22

## 2020-01-01 RX ADMIN — HYDROCODONE BITARTRATE AND ACETAMINOPHEN 1 TABLET: 7.5; 325 TABLET ORAL at 17:30

## 2020-01-01 RX ADMIN — INSULIN LISPRO 3 UNITS: 100 INJECTION, SOLUTION INTRAVENOUS; SUBCUTANEOUS at 13:21

## 2020-01-01 RX ADMIN — PROBENECID 500 MG: 500 TABLET, FILM COATED ORAL at 09:19

## 2020-01-01 RX ADMIN — FUROSEMIDE 40 MG: 40 INJECTION, SOLUTION INTRAMUSCULAR; INTRAVENOUS at 09:33

## 2020-01-01 RX ADMIN — Medication 5 ML: at 04:00

## 2020-01-01 RX ADMIN — SOTALOL HYDROCHLORIDE 80 MG: 80 TABLET ORAL at 08:15

## 2020-01-01 RX ADMIN — SODIUM BICARBONATE 50 MEQ: 84 INJECTION, SOLUTION INTRAVENOUS at 05:01

## 2020-01-01 RX ADMIN — INSULIN GLARGINE 5 UNITS: 100 INJECTION, SOLUTION SUBCUTANEOUS at 21:55

## 2020-01-01 RX ADMIN — INSULIN LISPRO 5 UNITS: 100 INJECTION, SOLUTION INTRAVENOUS; SUBCUTANEOUS at 17:18

## 2020-01-01 RX ADMIN — INSULIN GLARGINE 5 UNITS: 100 INJECTION, SOLUTION SUBCUTANEOUS at 22:12

## 2020-01-01 RX ADMIN — NYSTATIN: 100000 POWDER TOPICAL at 17:01

## 2020-01-01 RX ADMIN — HYDROCORTISONE SODIUM SUCCINATE 50 MG: 100 INJECTION, POWDER, FOR SOLUTION INTRAMUSCULAR; INTRAVENOUS at 21:18

## 2020-01-01 RX ADMIN — FAMOTIDINE 20 MG: 10 INJECTION INTRAVENOUS at 07:55

## 2020-01-01 RX ADMIN — INSULIN LISPRO 3 UNITS: 100 INJECTION, SOLUTION INTRAVENOUS; SUBCUTANEOUS at 16:58

## 2020-01-01 RX ADMIN — SODIUM CHLORIDE 75 ML/HR: 900 INJECTION, SOLUTION INTRAVENOUS at 05:00

## 2020-01-01 RX ADMIN — HYDROCORTISONE SODIUM SUCCINATE 50 MG: 100 INJECTION, POWDER, FOR SOLUTION INTRAMUSCULAR; INTRAVENOUS at 05:24

## 2020-01-01 RX ADMIN — INSULIN LISPRO 3 UNITS: 100 INJECTION, SOLUTION INTRAVENOUS; SUBCUTANEOUS at 11:31

## 2020-01-01 RX ADMIN — NYSTATIN: 100000 POWDER TOPICAL at 08:30

## 2020-01-01 RX ADMIN — Medication 5 ML: at 20:00

## 2020-01-01 RX ADMIN — SOTALOL HYDROCHLORIDE 120 MG: 80 TABLET ORAL at 08:41

## 2020-01-01 RX ADMIN — IPRATROPIUM BROMIDE AND ALBUTEROL SULFATE 3 ML: .5; 3 SOLUTION RESPIRATORY (INHALATION) at 23:52

## 2020-01-01 RX ADMIN — INSULIN LISPRO 3 UNITS: 100 INJECTION, SOLUTION INTRAVENOUS; SUBCUTANEOUS at 18:05

## 2020-01-01 RX ADMIN — NYSTATIN: 100000 POWDER TOPICAL at 08:41

## 2020-01-01 RX ADMIN — PROBENECID 500 MG: 500 TABLET, FILM COATED ORAL at 20:52

## 2020-01-01 RX ADMIN — GABAPENTIN 300 MG: 300 CAPSULE ORAL at 22:38

## 2020-01-01 RX ADMIN — SODIUM CHLORIDE 125 ML/HR: 900 INJECTION, SOLUTION INTRAVENOUS at 04:50

## 2020-01-01 RX ADMIN — EPINEPHRINE 1 MG: 0.1 INJECTION, SOLUTION ENDOTRACHEAL; INTRACARDIAC; INTRAVENOUS at 04:23

## 2020-01-01 RX ADMIN — SOTALOL HYDROCHLORIDE 120 MG: 80 TABLET ORAL at 08:44

## 2020-01-01 RX ADMIN — NYSTATIN: 100000 POWDER TOPICAL at 08:06

## 2020-01-01 RX ADMIN — GABAPENTIN 300 MG: 300 CAPSULE ORAL at 21:28

## 2020-01-01 RX ADMIN — SOTALOL HYDROCHLORIDE 120 MG: 80 TABLET ORAL at 21:11

## 2020-01-01 RX ADMIN — CALCIUM CHLORIDE 1 G: 100 INJECTION, SOLUTION INTRAVENOUS; INTRAVENTRICULAR at 04:29

## 2020-01-01 RX ADMIN — HYDROCODONE BITARTRATE AND ACETAMINOPHEN 1 TABLET: 7.5; 325 TABLET ORAL at 07:58

## 2020-01-01 RX ADMIN — FAMOTIDINE 20 MG: 10 INJECTION INTRAVENOUS at 08:35

## 2020-01-01 RX ADMIN — HYDROCODONE BITARTRATE AND ACETAMINOPHEN 1 TABLET: 5; 325 TABLET ORAL at 14:18

## 2020-01-01 RX ADMIN — HYDROCORTISONE SODIUM SUCCINATE 50 MG: 100 INJECTION, POWDER, FOR SOLUTION INTRAMUSCULAR; INTRAVENOUS at 05:22

## 2020-01-01 RX ADMIN — INSULIN LISPRO 2 UNITS: 100 INJECTION, SOLUTION INTRAVENOUS; SUBCUTANEOUS at 17:22

## 2020-01-01 RX ADMIN — INSULIN LISPRO 6 UNITS: 100 INJECTION, SOLUTION INTRAVENOUS; SUBCUTANEOUS at 13:03

## 2020-01-01 RX ADMIN — INSULIN LISPRO 5 UNITS: 100 INJECTION, SOLUTION INTRAVENOUS; SUBCUTANEOUS at 08:00

## 2020-01-01 RX ADMIN — INSULIN LISPRO 5 UNITS: 100 INJECTION, SOLUTION INTRAVENOUS; SUBCUTANEOUS at 07:43

## 2020-01-01 RX ADMIN — Medication 5 ML: at 20:36

## 2020-01-01 RX ADMIN — SODIUM CHLORIDE 125 ML/HR: 900 INJECTION, SOLUTION INTRAVENOUS at 21:31

## 2020-01-01 RX ADMIN — SOTALOL HYDROCHLORIDE 80 MG: 80 TABLET ORAL at 20:00

## 2020-01-01 RX ADMIN — IOPAMIDOL 80 ML: 755 INJECTION, SOLUTION INTRAVENOUS at 18:40

## 2020-01-01 RX ADMIN — INSULIN LISPRO 3 UNITS: 100 INJECTION, SOLUTION INTRAVENOUS; SUBCUTANEOUS at 16:39

## 2020-01-01 RX ADMIN — SOTALOL HYDROCHLORIDE 120 MG: 80 TABLET ORAL at 20:14

## 2020-01-01 RX ADMIN — CEFPODOXIME PROXETIL 200 MG: 200 TABLET, FILM COATED ORAL at 08:39

## 2020-01-01 RX ADMIN — EPINEPHRINE 1 MG: 0.1 INJECTION, SOLUTION ENDOTRACHEAL; INTRACARDIAC; INTRAVENOUS at 04:20

## 2020-01-01 RX ADMIN — FAMOTIDINE 20 MG: 20 TABLET, FILM COATED ORAL at 08:41

## 2020-01-01 RX ADMIN — INSULIN LISPRO 2 UNITS: 100 INJECTION, SOLUTION INTRAVENOUS; SUBCUTANEOUS at 16:30

## 2020-01-01 RX ADMIN — SOTALOL HYDROCHLORIDE 120 MG: 80 TABLET ORAL at 21:28

## 2020-01-01 RX ADMIN — HYDROCORTISONE SODIUM SUCCINATE 50 MG: 100 INJECTION, POWDER, FOR SOLUTION INTRAMUSCULAR; INTRAVENOUS at 13:16

## 2020-01-01 RX ADMIN — GABAPENTIN 300 MG: 300 CAPSULE ORAL at 20:25

## 2020-01-01 RX ADMIN — NYSTATIN: 100000 POWDER TOPICAL at 17:28

## 2020-01-01 RX ADMIN — ACETAMINOPHEN 650 MG: 325 TABLET, FILM COATED ORAL at 15:00

## 2020-01-01 RX ADMIN — NYSTATIN: 100000 POWDER TOPICAL at 17:30

## 2020-01-01 RX ADMIN — SOTALOL HYDROCHLORIDE 120 MG: 80 TABLET ORAL at 09:03

## 2020-01-01 RX ADMIN — GABAPENTIN 300 MG: 300 CAPSULE ORAL at 08:36

## 2020-01-01 RX ADMIN — SOTALOL HYDROCHLORIDE 120 MG: 80 TABLET ORAL at 20:45

## 2020-01-01 RX ADMIN — INSULIN LISPRO 5 UNITS: 100 INJECTION, SOLUTION INTRAVENOUS; SUBCUTANEOUS at 08:09

## 2020-01-01 RX ADMIN — FUROSEMIDE 40 MG: 40 INJECTION, SOLUTION INTRAMUSCULAR; INTRAVENOUS at 03:57

## 2020-01-01 RX ADMIN — NYSTATIN: 100000 POWDER TOPICAL at 17:17

## 2020-01-01 RX ADMIN — GABAPENTIN 300 MG: 300 CAPSULE ORAL at 08:35

## 2020-01-01 RX ADMIN — INSULIN LISPRO 3 UNITS: 100 INJECTION, SOLUTION INTRAVENOUS; SUBCUTANEOUS at 11:30

## 2020-01-01 RX ADMIN — SOTALOL HYDROCHLORIDE 120 MG: 80 TABLET ORAL at 21:34

## 2020-01-01 RX ADMIN — FUROSEMIDE 40 MG: 40 INJECTION, SOLUTION INTRAMUSCULAR; INTRAVENOUS at 09:19

## 2020-01-01 RX ADMIN — GABAPENTIN 300 MG: 300 CAPSULE ORAL at 07:55

## 2020-01-01 RX ADMIN — NYSTATIN: 100000 POWDER TOPICAL at 17:40

## 2020-01-01 RX ADMIN — SOTALOL HYDROCHLORIDE 120 MG: 80 TABLET ORAL at 22:42

## 2020-01-01 RX ADMIN — ACETAMINOPHEN 650 MG: 325 TABLET, FILM COATED ORAL at 08:00

## 2020-01-01 RX ADMIN — SODIUM CHLORIDE 100 ML: 900 INJECTION, SOLUTION INTRAVENOUS at 18:40

## 2020-01-01 RX ADMIN — FUROSEMIDE 20 MG: 20 TABLET ORAL at 09:03

## 2020-01-01 RX ADMIN — SOTALOL HYDROCHLORIDE 80 MG: 80 TABLET ORAL at 08:48

## 2020-01-01 RX ADMIN — NYSTATIN: 100000 POWDER TOPICAL at 08:15

## 2020-01-01 RX ADMIN — Medication 5 ML: at 16:41

## 2020-01-01 RX ADMIN — HYDROCODONE BITARTRATE AND ACETAMINOPHEN 1 TABLET: 5; 325 TABLET ORAL at 07:40

## 2020-01-01 RX ADMIN — SODIUM BICARBONATE 50 MEQ: 84 INJECTION, SOLUTION INTRAVENOUS at 04:22

## 2020-01-01 RX ADMIN — CEFEPIME HYDROCHLORIDE 1 G: 1 INJECTION, POWDER, FOR SOLUTION INTRAMUSCULAR; INTRAVENOUS at 07:41

## 2020-01-01 RX ADMIN — HYDROCORTISONE SODIUM SUCCINATE 50 MG: 100 INJECTION, POWDER, FOR SOLUTION INTRAMUSCULAR; INTRAVENOUS at 08:00

## 2020-01-01 RX ADMIN — TAMSULOSIN HYDROCHLORIDE 0.4 MG: 0.4 CAPSULE ORAL at 08:40

## 2020-01-01 RX ADMIN — NYSTATIN: 100000 POWDER TOPICAL at 17:34

## 2020-01-01 RX ADMIN — HYDROCORTISONE SODIUM SUCCINATE 50 MG: 100 INJECTION, POWDER, FOR SOLUTION INTRAMUSCULAR; INTRAVENOUS at 20:31

## 2020-01-01 RX ADMIN — GABAPENTIN 300 MG: 300 CAPSULE ORAL at 21:10

## 2020-01-01 RX ADMIN — FUROSEMIDE 40 MG: 40 INJECTION, SOLUTION INTRAMUSCULAR; INTRAVENOUS at 18:22

## 2020-01-01 RX ADMIN — SODIUM BICARBONATE 50 MEQ: 84 INJECTION, SOLUTION INTRAVENOUS at 04:28

## 2020-01-01 RX ADMIN — INSULIN LISPRO 3 UNITS: 100 INJECTION, SOLUTION INTRAVENOUS; SUBCUTANEOUS at 07:30

## 2020-01-01 RX ADMIN — FAMOTIDINE 20 MG: 10 INJECTION INTRAVENOUS at 20:47

## 2020-01-01 RX ADMIN — HYDROCODONE BITARTRATE AND ACETAMINOPHEN 1 TABLET: 7.5; 325 TABLET ORAL at 18:18

## 2020-01-01 RX ADMIN — FAMOTIDINE 20 MG: 20 TABLET, FILM COATED ORAL at 08:44

## 2020-01-01 RX ADMIN — PROBENECID 500 MG: 500 TABLET, FILM COATED ORAL at 21:33

## 2020-01-01 RX ADMIN — SOTALOL HYDROCHLORIDE 120 MG: 80 TABLET ORAL at 09:19

## 2020-01-01 RX ADMIN — FAMOTIDINE 20 MG: 10 INJECTION INTRAVENOUS at 21:22

## 2020-01-01 RX ADMIN — MORPHINE SULFATE 4 MG: 4 INJECTION INTRAVENOUS at 16:04

## 2020-01-01 RX ADMIN — HYDROCODONE BITARTRATE AND ACETAMINOPHEN 1 TABLET: 7.5; 325 TABLET ORAL at 11:13

## 2020-01-01 RX ADMIN — GABAPENTIN 300 MG: 300 CAPSULE ORAL at 21:39

## 2020-01-01 RX ADMIN — GABAPENTIN 300 MG: 300 CAPSULE ORAL at 20:14

## 2020-01-01 RX ADMIN — SODIUM CHLORIDE 75 ML/HR: 900 INJECTION, SOLUTION INTRAVENOUS at 05:33

## 2020-01-01 RX ADMIN — CEFTRIAXONE 2 G: 2 INJECTION, POWDER, FOR SOLUTION INTRAMUSCULAR; INTRAVENOUS at 08:48

## 2020-01-01 RX ADMIN — INSULIN LISPRO 5 UNITS: 100 INJECTION, SOLUTION INTRAVENOUS; SUBCUTANEOUS at 13:03

## 2020-01-01 RX ADMIN — INSULIN LISPRO 3 UNITS: 100 INJECTION, SOLUTION INTRAVENOUS; SUBCUTANEOUS at 21:27

## 2020-01-01 RX ADMIN — HYDROCODONE BITARTRATE AND ACETAMINOPHEN 1 TABLET: 7.5; 325 TABLET ORAL at 23:11

## 2020-01-01 RX ADMIN — Medication 10 ML: at 18:40

## 2020-01-01 RX ADMIN — FAMOTIDINE 20 MG: 20 TABLET, FILM COATED ORAL at 17:22

## 2020-01-01 RX ADMIN — MAGNESIUM SULFATE HEPTAHYDRATE 2 G: 40 INJECTION, SOLUTION INTRAVENOUS at 08:34

## 2020-01-01 RX ADMIN — INSULIN LISPRO 5 UNITS: 100 INJECTION, SOLUTION INTRAVENOUS; SUBCUTANEOUS at 16:38

## 2020-01-01 RX ADMIN — PROBENECID 500 MG: 500 TABLET, FILM COATED ORAL at 08:12

## 2020-01-01 RX ADMIN — SOTALOL HYDROCHLORIDE 120 MG: 80 TABLET ORAL at 08:12

## 2020-01-01 RX ADMIN — PROBENECID 500 MG: 500 TABLET, FILM COATED ORAL at 20:46

## 2020-01-01 RX ADMIN — PROBENECID 500 MG: 500 TABLET, FILM COATED ORAL at 08:35

## 2020-01-01 RX ADMIN — TRAMADOL HYDROCHLORIDE 50 MG: 50 TABLET, FILM COATED ORAL at 06:17

## 2020-01-01 RX ADMIN — SODIUM CHLORIDE 100 ML/HR: 900 INJECTION, SOLUTION INTRAVENOUS at 05:24

## 2020-01-01 RX ADMIN — HYDROCODONE BITARTRATE AND ACETAMINOPHEN 1 TABLET: 5; 325 TABLET ORAL at 21:09

## 2020-01-01 RX ADMIN — INSULIN LISPRO 5 UNITS: 100 INJECTION, SOLUTION INTRAVENOUS; SUBCUTANEOUS at 15:55

## 2020-01-01 RX ADMIN — HYDROCODONE BITARTRATE AND ACETAMINOPHEN 1 TABLET: 7.5; 325 TABLET ORAL at 20:17

## 2020-01-01 RX ADMIN — PROBENECID 500 MG: 500 TABLET, FILM COATED ORAL at 21:11

## 2020-01-01 RX ADMIN — INSULIN LISPRO 3 UNITS: 100 INJECTION, SOLUTION INTRAVENOUS; SUBCUTANEOUS at 21:24

## 2020-01-01 RX ADMIN — INSULIN LISPRO 3 UNITS: 100 INJECTION, SOLUTION INTRAVENOUS; SUBCUTANEOUS at 07:43

## 2020-01-01 RX ADMIN — FAMOTIDINE 20 MG: 20 TABLET, FILM COATED ORAL at 17:30

## 2020-01-01 RX ADMIN — FUROSEMIDE 20 MG: 20 TABLET ORAL at 09:17

## 2020-01-01 RX ADMIN — Medication 5 ML: at 08:51

## 2020-01-01 RX ADMIN — INSULIN GLARGINE 5 UNITS: 100 INJECTION, SOLUTION SUBCUTANEOUS at 21:20

## 2020-01-01 RX ADMIN — SODIUM CHLORIDE 500 ML: 900 INJECTION, SOLUTION INTRAVENOUS at 15:19

## 2020-01-01 RX ADMIN — Medication 16 MCG/MIN: at 22:28

## 2020-01-01 RX ADMIN — NYSTATIN: 100000 POWDER TOPICAL at 17:06

## 2020-01-01 RX ADMIN — HYDROCODONE BITARTRATE AND ACETAMINOPHEN 1 TABLET: 7.5; 325 TABLET ORAL at 08:46

## 2020-01-01 RX ADMIN — FUROSEMIDE 20 MG: 20 TABLET ORAL at 10:33

## 2020-01-01 RX ADMIN — PROBENECID 500 MG: 500 TABLET, FILM COATED ORAL at 21:28

## 2020-01-01 RX ADMIN — PROBENECID 500 MG: 500 TABLET, FILM COATED ORAL at 08:41

## 2020-01-01 RX ADMIN — SOTALOL HYDROCHLORIDE 120 MG: 80 TABLET ORAL at 21:32

## 2020-01-01 RX ADMIN — Medication 5 ML: at 15:46

## 2020-01-01 RX ADMIN — HYDROCODONE BITARTRATE AND ACETAMINOPHEN 1 TABLET: 7.5; 325 TABLET ORAL at 22:53

## 2020-01-01 RX ADMIN — NYSTATIN: 100000 POWDER TOPICAL at 14:31

## 2020-01-01 RX ADMIN — TRANEXAMIC ACID 1497 MG: 1 INJECTION, SOLUTION INTRAVENOUS at 18:23

## 2020-01-01 RX ADMIN — SOTALOL HYDROCHLORIDE 80 MG: 80 TABLET ORAL at 20:20

## 2020-01-01 RX ADMIN — GABAPENTIN 300 MG: 300 CAPSULE ORAL at 21:22

## 2020-01-01 RX ADMIN — INSULIN LISPRO 3 UNITS: 100 INJECTION, SOLUTION INTRAVENOUS; SUBCUTANEOUS at 22:12

## 2020-01-01 RX ADMIN — EPINEPHRINE 1 MG: 0.1 INJECTION, SOLUTION ENDOTRACHEAL; INTRACARDIAC; INTRAVENOUS at 04:57

## 2020-01-01 RX ADMIN — HYDROCODONE BITARTRATE AND ACETAMINOPHEN 1 TABLET: 5; 325 TABLET ORAL at 11:25

## 2020-01-01 RX ADMIN — INSULIN LISPRO 5 UNITS: 100 INJECTION, SOLUTION INTRAVENOUS; SUBCUTANEOUS at 11:32

## 2020-01-01 RX ADMIN — CEFTRIAXONE 2 G: 2 INJECTION, POWDER, FOR SOLUTION INTRAMUSCULAR; INTRAVENOUS at 07:54

## 2020-01-01 RX ADMIN — TRAMADOL HYDROCHLORIDE 50 MG: 50 TABLET, FILM COATED ORAL at 10:37

## 2020-01-01 RX ADMIN — TUBERCULIN PURIFIED PROTEIN DERIVATIVE 5 UNITS: 5 INJECTION, SOLUTION INTRADERMAL at 17:29

## 2020-01-01 RX ADMIN — INSULIN LISPRO 3 UNITS: 100 INJECTION, SOLUTION INTRAVENOUS; SUBCUTANEOUS at 21:38

## 2020-01-01 RX ADMIN — INSULIN LISPRO 3 UNITS: 100 INJECTION, SOLUTION INTRAVENOUS; SUBCUTANEOUS at 22:15

## 2020-01-01 RX ADMIN — HYDROCODONE BITARTRATE AND ACETAMINOPHEN 1 TABLET: 7.5; 325 TABLET ORAL at 18:21

## 2020-01-01 RX ADMIN — FAMOTIDINE 20 MG: 20 TABLET, FILM COATED ORAL at 10:33

## 2020-01-01 RX ADMIN — Medication 5 ML: at 17:01

## 2020-01-01 RX ADMIN — INSULIN LISPRO 5 UNITS: 100 INJECTION, SOLUTION INTRAVENOUS; SUBCUTANEOUS at 07:30

## 2020-01-01 RX ADMIN — HYDROCODONE BITARTRATE AND ACETAMINOPHEN 1 TABLET: 7.5; 325 TABLET ORAL at 09:19

## 2020-01-01 RX ADMIN — HYDROCORTISONE SODIUM SUCCINATE 50 MG: 100 INJECTION, POWDER, FOR SOLUTION INTRAMUSCULAR; INTRAVENOUS at 23:00

## 2020-01-01 RX ADMIN — INSULIN LISPRO 3 UNITS: 100 INJECTION, SOLUTION INTRAVENOUS; SUBCUTANEOUS at 08:00

## 2020-01-01 RX ADMIN — NYSTATIN: 100000 POWDER TOPICAL at 08:52

## 2020-01-01 RX ADMIN — INSULIN LISPRO 5 UNITS: 100 INJECTION, SOLUTION INTRAVENOUS; SUBCUTANEOUS at 11:30

## 2020-01-01 RX ADMIN — Medication 5 ML: at 20:22

## 2020-01-01 RX ADMIN — FUROSEMIDE 20 MG: 20 TABLET ORAL at 08:11

## 2020-01-01 RX ADMIN — INSULIN LISPRO 6 UNITS: 100 INJECTION, SOLUTION INTRAVENOUS; SUBCUTANEOUS at 17:18

## 2020-01-01 RX ADMIN — HALOPERIDOL LACTATE 5 MG: 5 INJECTION, SOLUTION INTRAMUSCULAR at 21:18

## 2020-01-01 RX ADMIN — PROBENECID 500 MG: 500 TABLET, FILM COATED ORAL at 10:33

## 2020-01-01 RX ADMIN — PERFLUTREN 1 ML: 6.52 INJECTION, SUSPENSION INTRAVENOUS at 09:50

## 2020-01-01 RX ADMIN — INSULIN LISPRO 5 UNITS: 100 INJECTION, SOLUTION INTRAVENOUS; SUBCUTANEOUS at 08:37

## 2020-01-01 RX ADMIN — NYSTATIN: 100000 POWDER TOPICAL at 17:23

## 2020-01-01 RX ADMIN — GABAPENTIN 300 MG: 300 CAPSULE ORAL at 09:27

## 2020-01-01 RX ADMIN — SOTALOL HYDROCHLORIDE 120 MG: 80 TABLET ORAL at 07:56

## 2020-01-01 RX ADMIN — FUROSEMIDE 40 MG: 10 INJECTION, SOLUTION INTRAMUSCULAR; INTRAVENOUS at 09:00

## 2020-01-01 RX ADMIN — CEFEPIME HYDROCHLORIDE 2 G: 2 INJECTION, POWDER, FOR SOLUTION INTRAVENOUS at 19:19

## 2020-01-01 RX ADMIN — CEFTRIAXONE 2 G: 2 INJECTION, POWDER, FOR SOLUTION INTRAMUSCULAR; INTRAVENOUS at 08:09

## 2020-01-01 RX ADMIN — FUROSEMIDE 20 MG: 20 TABLET ORAL at 08:39

## 2020-01-01 RX ADMIN — SODIUM CHLORIDE 1000 ML: 900 INJECTION, SOLUTION INTRAVENOUS at 18:10

## 2020-01-01 RX ADMIN — PROBENECID 500 MG: 500 TABLET, FILM COATED ORAL at 21:39

## 2020-01-01 RX ADMIN — CEFEPIME HYDROCHLORIDE 2 G: 2 INJECTION, POWDER, FOR SOLUTION INTRAVENOUS at 03:14

## 2020-01-01 RX ADMIN — LORAZEPAM 1 MG: 1 TABLET ORAL at 23:54

## 2020-01-01 RX ADMIN — FAMOTIDINE 20 MG: 20 TABLET, FILM COATED ORAL at 09:19

## 2020-01-01 RX ADMIN — INSULIN LISPRO 3 UNITS: 100 INJECTION, SOLUTION INTRAVENOUS; SUBCUTANEOUS at 21:28

## 2020-01-17 PROBLEM — T14.8XXA SUBCUTANEOUS HEMATOMA: Status: ACTIVE | Noted: 2020-01-01

## 2020-01-17 PROBLEM — S20.211A HEMATOMA OF RIGHT CHEST WALL: Status: ACTIVE | Noted: 2020-01-01

## 2020-01-17 PROBLEM — D62 ACUTE BLOOD LOSS ANEMIA: Status: ACTIVE | Noted: 2020-01-01

## 2020-01-17 PROBLEM — E87.70 FLUID OVERLOAD: Status: ACTIVE | Noted: 2020-01-01

## 2020-01-17 PROBLEM — I48.91 A-FIB (HCC): Status: ACTIVE | Noted: 2020-01-01

## 2020-01-17 PROBLEM — S20.219A CHEST WALL HEMATOMA: Status: ACTIVE | Noted: 2020-01-01

## 2020-01-17 PROBLEM — I95.9 HYPOTENSION: Status: ACTIVE | Noted: 2020-01-01

## 2020-01-17 PROBLEM — Z79.01 ANTICOAGULATED: Status: ACTIVE | Noted: 2020-01-01

## 2020-01-17 PROBLEM — D50.0 BLOOD LOSS ANEMIA: Status: ACTIVE | Noted: 2020-01-01

## 2020-01-17 PROBLEM — M25.561 ACUTE PAIN OF RIGHT KNEE: Status: ACTIVE | Noted: 2020-01-01

## 2020-01-17 PROBLEM — D68.9 COAGULOPATHY (HCC): Status: ACTIVE | Noted: 2020-01-01

## 2020-01-17 PROBLEM — R53.81 DEBILITY: Status: ACTIVE | Noted: 2020-01-01
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
## 2020-01-17 NOTE — PROGRESS NOTES
TRANSFER - IN REPORT: 
 
Verbal report received from [address] [place] [address] Road (name) on Sherrill Apgar  being received from ed(unit) for routine progression of care Report consisted of patients Situation, Background, Assessment and  
Recommendations(SBAR). Information from the following report(s) SBAR, Kardex, ED Summary, Intake/Output, MAR, Med Rec Status, Cardiac Rhythm a fib and Alarm Parameters  was reviewed with the receiving nurse. Opportunity for questions and clarification was provided. Assessment completed upon patients arrival to unit and care assumed.

## 2020-01-17 NOTE — H&P
HOSPITALIST H&P/CONSULT 
NAME:  Beth Contreras Age:  70 y.o. 
:   1940 MRN:   [id number] PCP: Tami Levin MD 
Consulting MD: Treatment Team: Attending Provider: Evert Menjivar MD; Primary Nurse: Mac Lawson; Surgeon: Ivonne Win MD 
HPI:  
 
CC: Pain in the right side of the chest 
 
This is a 68-year-old male patient who has a past medical history of diabetes, atrial fibrillation on anticoagulation with Eliquis, obesity, status post pacemaker placement, chronic diastolic heart failure, frequent falls. The patient has been having unsteady gait and frequent falls lately and for that reason he has been evaluated by cardiology in order to have a watchman device inserted. This afternoon the patient lost his balance and while he was walking with the help of his Rollator walker he felt on his right side. He hit the right side of his chest against a Rollator and he also suffered a minor trauma on the right knee. He called the EMS and by the time they arrived to the scene the patient was already up. He had a small hematoma in the right upper part of his chest and for that reason the EMS left his home after he told him that he was feeling fine. Unfortunately within the next hour his hematoma increased in size, it became very painful and hard and for that reason he called back to EMS. When he arrived to the emergency room his vital signs were blood pressure of 100/52, heart rate of 76 respiratory rate of 16 O2 saturation of 97%. The patient was awake and alert. His lungs were clear to auscultation. He had a hematoma the size of cantaloupe in the upper part of his right chest.  Initial blood work-up reported a white blood cell count of 14.5, hemoglobin of 7.3, platelets of 178, his INR was 1.5, his sodium was 138, potassium 4.5, glucose 77, creatinine 1.3, total bilirubin of 0.3. His proBNP was 2000.   General surgery was consulted they assessed the patient and recommended conservative management for now. The hospitalist team was consulted. The patient was assessed in the emergency room and he was slightly hypotensive but awake and alert. Apparently he became hypotensive after receiving a dose of intravenous morphine. He is receiving 1 bolus of normal saline now. 2 units of packed RBCs have been ordered. The patient has important bleeding but is not or does not seem to be life-threatening bleeding. Ford Crick will not be ordered for now. Tranexamic acid has been ordered. We will monitor his vital signs and his hemoglobin level closely. Case was discussed with ER MD. 
10 point ROS done and is negative except as noted in HPI. Past Medical History:  
Diagnosis Date  Arrhythmia Paroxysmal Atrial fibrillation  Arthritis   
 hip and knee  CAD (coronary artery disease)  Cardiac pacemaker 11/12/2015  Chronic diastolic heart failure ([address] 11/12/2015  Chronic pain   
 hips & knees  Diabetes ([address]  Diabetes mellitus type II, uncontrolled ([address] adult onset  Dyspnea/shortness of breath 11/12/2015  GERD (gastroesophageal reflux disease)  Heart failure ([address]  HTN - uncontrolled, malignant 8/8/2016  Hyperlipidemia 11/12/2015  Hypertension   
 malignant, uncontrolled  Malaise/fatigue/weakness/tiredness 11/12/2015  Mixed hyperlipidemia  Morbid obesity ([address]  Orthostatic hypotension 11/12/2015  Other ill-defined conditions(799.89) mixed hyperlipidemia  Paroxysmal atrial fibrillation (HCC)   
 spontaneously converted from Afib to sinus with sotalol, no eCV  Permanent atrial fibrillation ([address] 11/12/2015  Tendon injury R foot  Unspecified sleep apnea Past Surgical History:  
Procedure Laterality Date  HX HERNIA REPAIR    
 HX OTHER SURGICAL    
 hemorrhoidectomy  HX PACEMAKER    
 MA LEFT HEART CATH,PERCUTANEOUS  3/31/2014  BMS to mid LAD  
  
 Prior to Admission Medications Prescriptions Last Dose Informant Patient Reported? Taking? ATORVASTATIN CALCIUM (ATORVASTATIN PO)   Yes No  
Sig: Take 10 mg by mouth nightly. HYDROcodone-acetaminophen (NORCO) 7.5-325 mg per tablet   Yes No  
Sig: Take 1 Tab by mouth. apixaban (ELIQUIS) 5 mg tablet   No No  
Sig: Take 1 Tab by mouth two (2) times a day. aspirin 81 mg chewable tablet   Yes No  
Sig: Take 81 mg by mouth daily. cephALEXin (KEFLEX) 250 mg capsule   No No  
Sig: Take 1 Cap by mouth four (4) times daily. clotrimazole-betamethasone (LOTRISONE) topical cream   Yes No  
Sig: Apply  to affected area two (2) times a day. furosemide (LASIX) 40 mg tablet   Yes No  
Sig: Take 40 mg by mouth as needed. gabapentin (NEURONTIN) 300 mg capsule   Yes No  
Sig: Take 300 mg by mouth two (2) times a day. indomethacin (INDOCIN) 50 mg capsule   Yes No  
Sig: Take  by mouth as needed. insulin detemir U-100 (LEVEMIR U-100 INSULIN) 100 unit/mL injection   No No  
Si Units by SubCUTAneous route nightly. insulin lispro (HUMALOG) 100 unit/mL injection   No No  
Sig: INITIATE INSULIN CORRECTIVE PROTOCOL: 
INITIATE INSULIN CORRECTIVE PROTOCOL: 
Normal Insulin Sensitivity For Blood Sugar (mg/dL) of:    
Less than 150 =   0 units 150 -199 =   2 units 200 -249 =   4 units 250 -299 =   6 units 300 -349 =   8 units 350 and above = 10 units and Call Physician Initiate Hypoglycemia protocol if blood glucose is <70 mg/dL Fast Acting - Administer Immediately - or within 15 minutes of start of meal, if mealtime coverage. omeprazole (PRILOSEC) 20 mg capsule   Yes No  
Sig: Take 20 mg by mouth. ondansetron (ZOFRAN ODT) 4 mg disintegrating tablet   No No  
Sig: Take 1 Tab by mouth every eight (8) hours as needed for Nausea. probenecid (BENEMID) 500 mg tablet   Yes No  
Sig: Take 500 mg by mouth two (2) times a day.   
sotalol (BETAPACE) 120 mg tablet   No No  
 Sig: Take 1 Tab by mouth every twelve (12) hours. tamsulosin (FLOMAX) 0.4 mg capsule   Yes No  
Sig: Take 0.4 mg by mouth daily. traMADol (ULTRAM) 50 mg tablet   Yes No  
Sig: Take 50 mg by mouth every six (6) hours as needed for Pain. Facility-Administered Medications: None Home meds reconciled. Allergies Allergen Reactions  Clindamycin Other (comments) Severe joint pain  Demerol [Meperidine] Other (comments) Bradycardia  Losartan-Hydrochlorothiazide Unable to Obtain Per patient's pharmacy  Pcn [Penicillins] Rash Social History Tobacco Use  Smoking status: Former Smoker Years: 8.00 Last attempt to quit: 3/25/1970 Years since quittin.8  Smokeless tobacco: Never Used Substance Use Topics  Alcohol use: No  
  
Family History Problem Relation Age of Onset  Diabetes Mother  Cancer Father  Diabetes Brother  Hypertension Brother Immunization History Administered Date(s) Administered  TB Skin Test (PPD) Intradermal 2019 Objective:  
 
Visit Vitals BP (!) 84/47 Pulse 76 Temp 97.6 °F (36.4 °C) Resp 16 Wt 149.7 kg (330 lb) SpO2 98% BMI 41.25 kg/m² Temp (24hrs), Av.6 °F (36.4 °C), Min:97.6 °F (36.4 °C), Max:97.6 °F (36.4 °C) Oxygen Therapy O2 Sat (%): 98 % (20 1650) Pulse via Oximetry: 77 beats per minute (20 1650) O2 Device: Room air (20 1541) Physical Exam: 
General:    Alert, cooperative Head:   NCAT. No obvious deformity Nose:  Nares normal. No drainage Lungs:   CTABL. No wheezing/rhonchi/rales Heart:   RRR. No m/r/g. Abdomen:   S/nt/nd. Bowel sounds normal.  
Extremities: No cyanosis. Skin:     Large hematoma in the R upper chest  
Neurologic: Moves all extremities. no gross focal deficits Data Review:  
Recent Results (from the past 24 hour(s)) GLUCOSE, POC Collection Time: 20  3:59 PM  
Result Value Ref Range Glucose (POC) 84 65 - 100 mg/dL CBC WITH AUTOMATED DIFF Collection Time: 01/17/20  4:01 PM  
Result Value Ref Range WBC 14.5 (H) 4.3 - 11.1 K/uL  
 RBC 3.42 (L) 4.23 - 5.6 M/uL HGB 7.3 (L) 13.6 - 17.2 g/dL HCT 25.2 (L) 41.1 - 50.3 % MCV 73.7 (L) 79.6 - 97.8 FL  
 MCH 21.3 (L) 26.1 - 32.9 PG  
 MCHC 29.0 (L) 31.4 - 35.0 g/dL  
 RDW 18.2 (H) 11.9 - 14.6 % PLATELET 993 078 - 394 K/uL MPV 11.2 9.4 - 12.3 FL ABSOLUTE NRBC 0.00 0.0 - 0.2 K/uL  
 DF AUTOMATED NEUTROPHILS 84 (H) 43 - 78 % LYMPHOCYTES 10 (L) 13 - 44 % MONOCYTES 4 4.0 - 12.0 % EOSINOPHILS 1 0.5 - 7.8 % BASOPHILS 0 0.0 - 2.0 % IMMATURE GRANULOCYTES 1 0.0 - 5.0 %  
 ABS. NEUTROPHILS 12.2 (H) 1.7 - 8.2 K/UL  
 ABS. LYMPHOCYTES 1.4 0.5 - 4.6 K/UL  
 ABS. MONOCYTES 0.6 0.1 - 1.3 K/UL  
 ABS. EOSINOPHILS 0.1 0.0 - 0.8 K/UL  
 ABS. BASOPHILS 0.0 0.0 - 0.2 K/UL  
 ABS. IMM. GRANS. 0.1 0.0 - 0.5 K/UL METABOLIC PANEL, COMPREHENSIVE Collection Time: 01/17/20  4:01 PM  
Result Value Ref Range Sodium 138 136 - 145 mmol/L Potassium 4.5 3.5 - 5.1 mmol/L Chloride 107 98 - 107 mmol/L  
 CO2 29 21 - 32 mmol/L Anion gap 2 (L) 7 - 16 mmol/L Glucose 77 65 - 100 mg/dL BUN 18 8 - 23 MG/DL Creatinine 1.34 0.8 - 1.5 MG/DL  
 GFR est AA >60 >60 ml/min/1.73m2 GFR est non-AA 55 (L) >60 ml/min/1.73m2 Calcium 8.3 8.3 - 10.4 MG/DL Bilirubin, total 0.3 0.2 - 1.1 MG/DL  
 ALT (SGPT) 11 (L) 12 - 65 U/L  
 AST (SGOT) 20 15 - 37 U/L Alk. phosphatase 49 (L) 50 - 136 U/L Protein, total 5.8 (L) 6.3 - 8.2 g/dL Albumin 2.1 (L) 3.2 - 4.6 g/dL Globulin 3.7 (H) 2.3 - 3.5 g/dL A-G Ratio 0.6 (L) 1.2 - 3.5 PTT Collection Time: 01/17/20  4:01 PM  
Result Value Ref Range aPTT 39.1 24.7 - 39.8 SEC PROTHROMBIN TIME + INR Collection Time: 01/17/20  4:01 PM  
Result Value Ref Range Prothrombin time 18.8 (H) 11.7 - 14.5 sec INR 1.5 NT-PRO BNP  Collection Time: 01/17/20  4:01 PM  
 Result Value Ref Range NT pro-BNP 2,147 (H) <450 PG/ML  
EKG, 12 LEAD, SUBSEQUENT Collection Time: 01/17/20  4:29 PM  
Result Value Ref Range Ventricular Rate 78 BPM  
 Atrial Rate 78 BPM  
 P-R Interval 262 ms QRS Duration 186 ms  
 Q-T Interval 490 ms QTC Calculation (Bezet) 558 ms Calculated P Axis 33 degrees Calculated R Axis -76 degrees Calculated T Axis 84 degrees Diagnosis    
  !! AGE AND GENDER SPECIFIC ECG ANALYSIS !! Sinus rhythm with 1st degree A-V block with Fusion complexes and Premature  
atrial complexes Left axis deviation Non-specific intra-ventricular conduction block Inferior infarct , age undetermined Anterolateral infarct , age undetermined Abnormal ECG When compared with ECG of 21-DEC-2019 22:36, Sinus rhythm has replaced Electronic ventricular pacemaker EKG, 12 LEAD, SUBSEQUENT Collection Time: 01/17/20  4:29 PM  
Result Value Ref Range Ventricular Rate 82 BPM  
 Atrial Rate 82 BPM  
 P-R Interval 280 ms QRS Duration 130 ms  
 Q-T Interval 438 ms QTC Calculation (Bezet) 511 ms Calculated P Axis -51 degrees Calculated R Axis -52 degrees Calculated T Axis -74 degrees Diagnosis    
  !! AGE AND GENDER SPECIFIC ECG ANALYSIS !! 
Unusual P axis, possible ectopic atrial rhythm Left axis deviation Left ventricular hypertrophy with QRS widening Inferior infarct (cited on or before 17-JAN-2020) Anterolateral infarct (cited on or before 17-JAN-2020) Abnormal ECG When compared with ECG of 17-JAN-2020 16:29, 
Ectopic atrial rhythm has replaced Sinus rhythm QRS duration has decreased Nonspecific T wave abnormality now evident in Inferior leads T wave inversion now evident in Anterior leads GLUCOSE, POC Collection Time: 01/17/20  4:50 PM  
Result Value Ref Range Glucose (POC) 82 65 - 100 mg/dL Imaging /Procedures /Studies: 
I personally reviewed all labs, imaging, and other studies this admission: CXR reviewed by me. EKG reviewed by me. CXR Results  (Last 48 hours) 01/17/20 1623  XR CHEST PORT Final result Impression:  Impression: CHF/volume overload however improved over prior exam.  
   
CPT code(s) 08835 Narrative:  Clinical History: The patient is a 70years year old Male presenting with  
symptoms of cough Comparison:  Chest x-ray 12/22/2019 Findings:  Frontal view of the chest was obtained. There is mildly improved pulmonary vascular congestion since prior exam, however  
with continued central vascular congestion. Small basilar effusions are  
suggested particularly on the right. The cardiomediastinal silhouette is within  
normal limits. There are no acute osseous abnormalities. A left subclavian  
pacing device remains in place. 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  Eliquis will be stopped. Aspirin will be stopped. The patient does not seem to have a life-threatening condition and for that reason Sampson Whitman has not been ordered. UptoDate recommends the use of tranexamic acid under these circumstances. Tranexamic acid has been ordered. 2 units of packed RBCs ordered. General surgery on board. The patient might need a surgical intervention for hematoma evacuation. N.p.o. at midnight #2 hypotension. Mild. The patient became hypotensive after receiving morphine. He will receive IV fluids. 2 units of packed RBCs have been ordered. Betapace to be continued only if systolic blood pressure is above 120 otherwise nurse has been instructed to hold this medication. #3 history of atrial fibrillation. The patient is on Betapace every 12 hours. Cardiology usually strongly recommend not to stop this medication only if absolutely necessary. I have ordered Betapace but nurse has been instructed to hold this medication if the systolic blood pressure is below 120. #4Diabetes type 2. Our medication reconciliation is not accurate. The patient is using Levemir 40 units 2 times per day and Humalog sliding scales. He seems to be having low blood sugar at this time. Only Humalog sliding scales have been ordered. #5 trauma of the right knee. Monitor for now. Addendum: Repeated hemoglobin level was 6.7. Patient is just about to start his transfusion with the first unit of packed RBCs. Remains hypotensive. Mental status is normal.  Will change disposition and patient will be admitted to the intensive care unit. FEN: Diabetic diet. N.p.o. at midnight DVT ppx: Bilateral compression devices Code status: Full Estimated LOS: More than 2 midnights Risk assessment: High 
Plan of care discussed with: patient Signed By: Kandace Muir MD   
 January 17, 2020

## 2020-01-17 NOTE — ED TRIAGE NOTES
Pt arrives via EMS from home. Pt fell at home and hit right chest on rollator and has very large hematoma to right chest. Pt is on eliquis. Pt BGL 96 before arrival but felt like decrease in bay and given 15g oral glucose by EMS. Pt states legs have been getting increasingly weak x 1 year. Pt reports pain to the area and right knee as well. Pt denies hip pain as well.  Pt is able to move his right arm, but reports pain to the shoulder and chest.

## 2020-01-17 NOTE — ROUTINE PROCESS
TRANSFER - OUT REPORT: 
 
Verbal report given to CCU RN on Chinedu Gonsales  being transferred to CCU for routine progression of care Report consisted of patients Situation, Background, Assessment and  
Recommendations(SBAR). Information from the following report(s) SBAR was reviewed with the receiving nurse. Lines:  
Peripheral IV 01/17/20 Right Antecubital (Active) Site Assessment Clean, dry, & intact 1/17/2020  4:38 PM  
Phlebitis Assessment 0 1/17/2020  4:38 PM  
Infiltration Assessment 0 1/17/2020  4:38 PM  
Dressing Status Clean, dry, & intact 1/17/2020  4:38 PM  
  
 
Opportunity for questions and clarification was provided. Patient transported with: 
 Registered Nurse

## 2020-01-17 NOTE — ED PROVIDER NOTES
Patient was using his Rollator to get around when he fell. He landed on his Rollator hitting the right anterior chest wall. Initially had a small hematoma. He is on Eliquis for atrial fibrillation. Over the past 2 hours since the fall the hematoma has expanded to a small melon. Very painful and firm. No shortness of breath. Denies any hip or back pain. Did hit his right knee slightly with some mild pain there. Did not hit his head or lose consciousness. The history is provided by the patient and the EMS personnel. No  was used. Fall The accident occurred 1 to 2 hours ago. The fall occurred while standing. He fell from a height of ground level. Impact surface: rollator. There was no blood loss. Point of impact: right chest wall. Pain location: right chest wall. The pain is moderate. Pertinent negatives include no visual change, no fever, no numbness, no abdominal pain, no nausea, no vomiting, no hematuria, no headaches, no extremity weakness, no loss of consciousness, no tingling and no laceration. The risk factors include being elderly. The symptoms are aggravated by pressure on injury. He has tried nothing for the symptoms. Past Medical History:  
Diagnosis Date  Arrhythmia Paroxysmal Atrial fibrillation  Arthritis   
 hip and knee  CAD (coronary artery disease)  Cardiac pacemaker 11/12/2015  Chronic diastolic heart failure ([address] 11/12/2015  Chronic pain   
 hips & knees  Diabetes ([address]  Diabetes mellitus type II, uncontrolled ([address] adult onset  Dyspnea/shortness of breath 11/12/2015  GERD (gastroesophageal reflux disease)  Heart failure ([address]  HTN - uncontrolled, malignant 8/8/2016  Hyperlipidemia 11/12/2015  Hypertension   
 malignant, uncontrolled  Malaise/fatigue/weakness/tiredness 11/12/2015  Mixed hyperlipidemia  Morbid obesity ([address]  Orthostatic hypotension 11/12/2015  Other ill-defined conditions(799.89) mixed hyperlipidemia  Paroxysmal atrial fibrillation (HCC)   
 spontaneously converted from Afib to sinus with sotalol, no eCV  Permanent atrial fibrillation ([facility] 2015  Tendon injury R foot  Unspecified sleep apnea Past Surgical History:  
Procedure Laterality Date  HX HERNIA REPAIR    
 HX OTHER SURGICAL    
 hemorrhoidectomy  HX PACEMAKER    
 ME LEFT HEART CATH,PERCUTANEOUS  3/31/2014 BMS to mid LAD Family History:  
Problem Relation Age of Onset  Diabetes Mother  Cancer Father  Diabetes Brother  Hypertension Brother Social History Socioeconomic History  Marital status:  Spouse name: Not on file  Number of children: Not on file  Years of education: Not on file  Highest education level: Not on file Occupational History  Not on file Social Needs  Financial resource strain: Not on file  Food insecurity:  
  Worry: Not on file Inability: Not on file  Transportation needs:  
  Medical: Not on file Non-medical: Not on file Tobacco Use  Smoking status: Former Smoker Years: 8.00 Last attempt to quit: 3/25/1970 Years since quittin.8  Smokeless tobacco: Never Used Substance and Sexual Activity  Alcohol use: No  
 Drug use: No  
 Sexual activity: Not on file Lifestyle  Physical activity:  
  Days per week: Not on file Minutes per session: Not on file  Stress: Not on file Relationships  Social connections:  
  Talks on phone: Not on file Gets together: Not on file Attends L[employer]-day service: Not on file Active member of club or organization: Not on file Attends meetings of clubs or organizations: Not on file Relationship status: Not on file  Intimate partner violence:  
  Fear of current or ex partner: Not on file Emotionally abused: Not on file Physically abused: Not on file Forced sexual activity: Not on file Other Topics Concern  Not on file Social History Narrative  Not on file ALLERGIES: Clindamycin; Demerol [meperidine]; Losartan-hydrochlorothiazide; and Pcn [penicillins] Review of Systems Constitutional: Negative for chills and fever. HENT: Negative for rhinorrhea and sore throat. Eyes: Negative for pain and redness. Respiratory: Negative for chest tightness, shortness of breath and wheezing. Cardiovascular: Positive for chest pain. Negative for leg swelling. Gastrointestinal: Negative for abdominal pain, diarrhea, nausea and vomiting. Genitourinary: Negative for dysuria and hematuria. Musculoskeletal: Negative for back pain, extremity weakness, gait problem, neck pain and neck stiffness. Skin: Negative for color change and rash. Neurological: Negative for tingling, loss of consciousness, weakness, numbness and headaches. Vitals:  
 01/17/20 1541 BP: 100/52 Pulse: 76 Resp: 16 Temp: 97.6 °F (36.4 °C) SpO2: 97% Weight: 149.7 kg (330 lb) Physical Exam 
Constitutional:   
   Appearance: Normal appearance. He is well-developed. HENT:  
   Head: Normocephalic and atraumatic. Neck: Musculoskeletal: Normal range of motion and neck supple. Cardiovascular:  
   Rate and Rhythm: Normal rate and regular rhythm. Pulmonary:  
   Effort: Pulmonary effort is normal.  
   Breath sounds: Normal breath sounds. Chest:  
   Chest wall: Tenderness present. Abdominal:  
   General: Bowel sounds are normal.  
   Palpations: Abdomen is soft. Tenderness: There is no tenderness. Musculoskeletal: Normal range of motion. General: Tenderness (mild over right knee. ) present. Right lower leg: Edema (worse than left) present. Left lower leg: Edema present. Skin: 
   General: Skin is warm and dry. Findings: No laceration. Neurological: Mental Status: He is alert and oriented to person, place, and time. MDM Number of Diagnoses or Management Options Hematoma (nontraumatic) of breast:  
Diagnosis management comments: Patient with a large expanding hematoma over the right chest.  On Eliquis. For atrial fibrillation. Had surgery Dr. Clau Cope see patient. Request hospitalist admit and hold Eliquis while following hematoma expansion. Amount and/or Complexity of Data Reviewed Clinical lab tests: ordered and reviewed Tests in the radiology section of CPT®: ordered and reviewed Tests in the medicine section of CPT®: ordered and reviewed Patient Progress Patient progress: stable Procedures EKG: nonspecific ST and T waves changes. Paced rhythm rate 78.

## 2020-01-18 NOTE — PROGRESS NOTES
[facility] OUTREACH NURSE PROGRESS REPORT SUBJECTIVE: Called to assess patient secondary to transfer from critical care. MEWS Score: 2 (01/18/20 1145) Vitals:  
 01/18/20 1245 01/18/20 1249 01/18/20 1300 01/18/20 1314 BP: 116/56 116/56 115/55 102/50 Pulse: 79 79 77 83 Resp: 17  (!) 32 (!) 34 Temp:      
SpO2: 96%  95% 95% Weight:      
  
 
LAB DATA: 
 
Recent Labs  
  01/18/20 
0335 01/17/20 
1601  138  
K 4.7 4.5  
* 107 CO2 25 29 AGAP 5* 2*  
* 77 BUN 21 18 CREA 1.36 1.34 GFRAA >60 >60 GFRNA 54* 55* CA 7.5* 8.3 MG 1.6*  --   
ALB 1.8* 2.1*  
TP 5.0* 5.8*  
GLOB 3.2 3.7* AGRAT 0.6* 0.6* ALT 9* 11* Recent Labs  
  01/18/20 
0913 01/18/20 
0335 01/18/20 
0015  01/17/20 
1601 WBC  --  17.0*  --   --  14.5* HGB 7.8* 7.8* 8.7*   < > 7.3* HCT 25.7* 25.7* 28.8*   < > 25.2*  
PLT  --  234  --   --  341  
 < > = values in this interval not displayed. OBJECTIVE: Assisted with transfer to new room Pain Assessment Pain Intensity 1: 0 (01/18/20 1145) Pain Location 1: Back Patient Stated Pain Goal: 0 
 
  
  
  
  
 
  
  
  
   
 
ASSESSMENT:  Patient alert, talkative. SpO2 100% on 2L NC, HR 87. Known hematoma to R chest with soft edges, remains ecchymotic and tender. PLAN:  Will continue to follow per critical care outreach protocol.

## 2020-01-18 NOTE — PROGRESS NOTES
Pt arrived to room 3307 from ED with RN. Pt. Alert and oriented on RA. Pt. Bathed with CHG wipes and placed on monitor. VSS. Large bruised area to upper R chest. Multiple bruised areas to sacrum and perineal area. Small bruise on R arm. Allevyn placed to sacrum. No other skin issues.

## 2020-01-18 NOTE — PROGRESS NOTES
TRANSFER - OUT REPORT: 
 
Verbal report given to Shashi Evangelista RN(name) on Farrah Gold  being transferred to [facility](unit) for routine progression of care Report consisted of patients Situation, Background, Assessment and  
Recommendations(SBAR). Information from the following report(s) SBAR, Kardex, ED Summary, Intake/Output, MAR, Med Rec Status, Cardiac Rhythm paced and Alarm Parameters  was reviewed with the receiving nurse. Lines:  
Peripheral IV 01/17/20 Right Antecubital (Active) Site Assessment Clean, dry, & intact 1/18/2020  7:14 AM  
Phlebitis Assessment 0 1/18/2020  7:14 AM  
Infiltration Assessment 0 1/18/2020  7:14 AM  
Dressing Status Clean, dry, & intact 1/18/2020  7:14 AM  
Dressing Type Transparent;Tape 1/18/2020  7:14 AM  
Hub Color/Line Status Pink;Patent; Flushed 1/18/2020  7:14 AM  
Alcohol Cap Used No 1/18/2020  7:14 AM  
   
Peripheral IV 01/17/20 Anterior; Left Hand (Active) Site Assessment Clean, dry, & intact 1/18/2020  7:14 AM  
Phlebitis Assessment 0 1/18/2020  7:14 AM  
Infiltration Assessment 0 1/18/2020  7:14 AM  
Dressing Status Clean, dry, & intact 1/18/2020  3:00 AM  
Dressing Type Transparent;Tape 1/18/2020  7:14 AM  
Hub Color/Line Status Pink;Patent; Flushed 1/18/2020  7:14 AM  
Alcohol Cap Used No 1/18/2020  7:14 AM  
   
Peripheral IV 01/17/20 Anterior;Right Forearm (Active) Site Assessment Clean, dry, & intact 1/18/2020  7:14 AM  
Phlebitis Assessment 0 1/18/2020  7:14 AM  
Infiltration Assessment 0 1/18/2020  7:14 AM  
Dressing Status Clean, dry, & intact 1/18/2020  7:14 AM  
Dressing Type Transparent;Tape 1/18/2020  7:14 AM  
Hub Color/Line Status Patent; Flushed 1/18/2020  7:14 AM  
Alcohol Cap Used No 1/18/2020  7:14 AM  
  
 
Opportunity for questions and clarification was provided. Patient transported with: 
 O2 @ 2 liters

## 2020-01-18 NOTE — PROGRESS NOTES
Pt requesting to stand up out of bed. Pt stood up twice with 3 person max assist. Pt unable to stabilize knees. Pt placed back in bed and put in chair position, resting on RA.

## 2020-01-18 NOTE — PROGRESS NOTES
Physical Therapy Note: 
 
Orders received, chart reviewed and initial evaluation attempted. Patient declined as he had attempted standing with nursing staff earlier and c/o fatigue. Will attempt at a later date as patient is able and schedule permits. Thank you, Demetrius Perez, PT, DPT

## 2020-01-18 NOTE — PROGRESS NOTES
Hospitalist Note Admit Date:  2020  3:33 PM  
Name:  Gus Pennington Age:  70 y.o. 
:  1940 MRN:  [id number] PCP:  Anneliese Richardson MD 
Treatment Team: Attending Provider: Sheron Kang MD; Surgeon: Mk Orellana MD; Consulting Provider: Taty Chau MD; Physical Therapist: Aleksandra Trevino, PT, DPT 
 
HPI/Subjective:  
Patient was transferred to ICU overnight due to concern for hypotension given his recent fall on eliquis with right chest wall hematoma. BP has been stable with systolic > 95. Repeat Hb is 7.8. Patient is resting comfortably in bed. AAOx3. Reports decrease in the size of the rt chest wall hematoma. Denies any chest discomfort, shortness of breath. Objective:  
 
Patient Vitals for the past 24 hrs: 
 Temp Pulse Resp BP SpO2  
20 0845  78 24 102/55 99 % 20 0836  76  98/57   
20 0834  77  98/57   
20 0829  79 (!) 36 98/57 98 % 20 0814  80 (!) 31 99/55 97 % 20 0800  75 20 99/55 96 % 20 0745  81 20 97/52 98 % 20 0729  78 27 110/56 99 % 20 0714 98.4 °F (36.9 °C) 75 19 112/55 98 % 20 0700  75 25 103/51 97 % 20 0632  80 20 107/54 99 % 20 0614  75 18 99/48 99 % 20 0601  78 22 117/57 99 % 20 0545  77 20 94/50 98 % 20 0530  75 19 (!) 89/45 99 % 20 0514    94/50 98 % 20 0513  75 23    
20 0500  75 18 (!) 88/52 99 % 20 0445  82 20 (!) 84/46 (!) 89 % 20 0430  75 18 (!) 88/51 94 % 20 0414  75 19 (!) 87/43 92 % 20 0400  78 23 106/54 95 % 20 0345  78 25 110/59 95 % 20 0330  76  113/57 95 % 20 0314    101/57 91 % 20 0301  77 23    
20 0300 97.3 °F (36.3 °C)   98/51 97 % 20 0245  81 19 96/55 95 % 20 0230    93/50 97 % 20 0229  75 21    
20 0214  79 20 93/50 94 % 01/18/20 0200  76 19 90/51 93 % 01/18/20 0146  79 13 90/53 95 % 01/18/20 0130  77 17 106/57 96 % 01/18/20 0114  76 22 101/54 92 % 01/18/20 0045  78 9 100/54 95 % 01/18/20 0032  77 (!) 7 (!) 81/47 95 % 01/18/20 0014    109/55 99 % 01/18/20 0000  77 16 115/57 98 % 01/17/20 2345    114/55 97 % 01/17/20 2344  77 12  98 % 01/17/20 2330  83 16 125/61 98 % 01/17/20 2314 97.6 °F (36.4 °C) 78 14 115/58 98 % 01/17/20 2301  80 22    
01/17/20 2300    119/68 98 % 01/17/20 2245  77 18 111/57 96 % 01/17/20 2230  76 21 113/58 94 % 01/17/20 2214  76 16 121/57 96 % 01/17/20 2200  80 16 132/58 95 % 01/17/20 2145  76 17 102/52 95 % 01/17/20 2130  76 8 99/52 96 % 01/17/20 2123 97.4 °F (36.3 °C)      
01/17/20 2114  75 17 109/55 93 % 01/17/20 2059  78 9 103/55 98 % 01/17/20 2045  75 19 109/56 96 % 01/17/20 2030  77 18 118/57 98 % 01/17/20 2014  80 13 101/55   
01/17/20 2009  99 9 (!) 72/51 (!) 74 % 01/17/20 2004  99 19 (!) 70/50 93 % 01/17/20 1959  100 9 (!) 67/47 96 % 01/17/20 1953  (!) 101 (!) 7 (!) 67/41 96 % 01/17/20 1946  (!) 101 20 (!) 73/37 97 % 01/17/20 1931  (!) 101 8 (!) 58/41 93 % 01/17/20 1914 97.6 °F (36.4 °C) (!) 102 21 (!) 80/50   
01/17/20 1904  (!) 102  (!) 82/57   
01/17/20 1834    98/55   
01/17/20 1829    98/53   
01/17/20 1824    (!) 76/44 95 % 01/17/20 1754     98 % 01/17/20 1752    (!) 73/46   
01/17/20 1655    (!) 84/47   
01/17/20 1650     98 % 01/17/20 1635    (!) 87/52 91 % 01/17/20 1615    (!) 89/44 93 % 01/17/20 1541 97.6 °F (36.4 °C) 76 16 100/52 97 % Oxygen Therapy O2 Sat (%): 99 % (01/18/20 0845) Pulse via Oximetry: 79 beats per minute (01/18/20 0845) O2 Device: Nasal cannula (01/18/20 0714) O2 Flow Rate (L/min): 2 l/min (01/18/20 0714) Estimated body mass index is 41.97 kg/m² as calculated from the following: Height as of 12/21/19: 6' 3\" (1.905 m). Weight as of this encounter: 152.3 kg (335 lb 12.2 oz). Intake/Output Summary (Last 24 hours) at 1/18/2020 8176 Last data filed at 1/18/2020 8672 Gross per 24 hour Intake 1994.57 ml Output 350 ml Net 1644.57 ml  
   
*Note that automatically entered I/Os may not be accurate; dependent on patient compliance with collection and accurate  by techs. Physical Exam:  
General:     alert, awake, no acute distress. Well nourished, head hunched forward(states its normal) Head:   normocephalic, atraumatic Eyes, Ears, nose: PERRL, EOMI. Neck:    supple, non-tender Lungs:   CTAB, no wheezing, rhonchi, rales Cardiac:   RRR, Normal S1 and S2. Right chest wall hematoma, slightly tender to palpation. Abdomen:   Soft, non distended, nontender, +BS, no guarding/rebound Extremities:   Warm, dry. No edema. RLE larger than LLE(normal per pt) some chronic discoloration in the RLE, non tender to palpation, no warmth Skin:   No rashes, no jaundice, some chronic discoloration in the RLE Neuro:  Alert and oriented to person, time, place, situation. No gross focal neurological deficit Psychiatric:  No anxiety, calm, cooperative Data Review: 
I have reviewed all labs, meds, and studies from the last 24 hours: 
 
Recent Results (from the past 24 hour(s)) GLUCOSE, POC Collection Time: 01/17/20  3:59 PM  
Result Value Ref Range Glucose (POC) 84 65 - 100 mg/dL CBC WITH AUTOMATED DIFF Collection Time: 01/17/20  4:01 PM  
Result Value Ref Range WBC 14.5 (H) 4.3 - 11.1 K/uL  
 RBC 3.42 (L) 4.23 - 5.6 M/uL HGB 7.3 (L) 13.6 - 17.2 g/dL HCT 25.2 (L) 41.1 - 50.3 % MCV 73.7 (L) 79.6 - 97.8 FL  
 MCH 21.3 (L) 26.1 - 32.9 PG  
 MCHC 29.0 (L) 31.4 - 35.0 g/dL  
 RDW 18.2 (H) 11.9 - 14.6 % PLATELET 376 564 - 658 K/uL MPV 11.2 9.4 - 12.3 FL ABSOLUTE NRBC 0.00 0.0 - 0.2 K/uL  
 DF AUTOMATED NEUTROPHILS 84 (H) 43 - 78 % LYMPHOCYTES 10 (L) 13 - 44 % MONOCYTES 4 4.0 - 12.0 % EOSINOPHILS 1 0.5 - 7.8 % BASOPHILS 0 0.0 - 2.0 % IMMATURE GRANULOCYTES 1 0.0 - 5.0 %  
 ABS. NEUTROPHILS 12.2 (H) 1.7 - 8.2 K/UL  
 ABS. LYMPHOCYTES 1.4 0.5 - 4.6 K/UL  
 ABS. MONOCYTES 0.6 0.1 - 1.3 K/UL  
 ABS. EOSINOPHILS 0.1 0.0 - 0.8 K/UL  
 ABS. BASOPHILS 0.0 0.0 - 0.2 K/UL  
 ABS. IMM. GRANS. 0.1 0.0 - 0.5 K/UL METABOLIC PANEL, COMPREHENSIVE Collection Time: 01/17/20  4:01 PM  
Result Value Ref Range Sodium 138 136 - 145 mmol/L Potassium 4.5 3.5 - 5.1 mmol/L Chloride 107 98 - 107 mmol/L  
 CO2 29 21 - 32 mmol/L Anion gap 2 (L) 7 - 16 mmol/L Glucose 77 65 - 100 mg/dL BUN 18 8 - 23 MG/DL Creatinine 1.34 0.8 - 1.5 MG/DL  
 GFR est AA >60 >60 ml/min/1.73m2 GFR est non-AA 55 (L) >60 ml/min/1.73m2 Calcium 8.3 8.3 - 10.4 MG/DL Bilirubin, total 0.3 0.2 - 1.1 MG/DL  
 ALT (SGPT) 11 (L) 12 - 65 U/L  
 AST (SGOT) 20 15 - 37 U/L Alk. phosphatase 49 (L) 50 - 136 U/L Protein, total 5.8 (L) 6.3 - 8.2 g/dL Albumin 2.1 (L) 3.2 - 4.6 g/dL Globulin 3.7 (H) 2.3 - 3.5 g/dL A-G Ratio 0.6 (L) 1.2 - 3.5 PTT Collection Time: 01/17/20  4:01 PM  
Result Value Ref Range aPTT 39.1 24.7 - 39.8 SEC PROTHROMBIN TIME + INR Collection Time: 01/17/20  4:01 PM  
Result Value Ref Range Prothrombin time 18.8 (H) 11.7 - 14.5 sec INR 1.5 NT-PRO BNP Collection Time: 01/17/20  4:01 PM  
Result Value Ref Range NT pro-BNP 2,147 (H) <450 PG/ML  
TYPE & SCREEN Collection Time: 01/17/20  4:26 PM  
Result Value Ref Range Crossmatch Expiration 01/20/2020 ABO/Rh(D) B POSITIVE Antibody screen NEG Unit number [id number] Blood component type [facility] Unit division 00 Status of unit TRANSFUSED Crossmatch result Compatible Unit number [id number] Blood component type [facility] Unit division 00 Status of unit TRANSFUSED Crossmatch result Compatible EKG, 12 LEAD, SUBSEQUENT Collection Time: 01/17/20  4:29 PM  
Result Value Ref Range Ventricular Rate 78 BPM  
 Atrial Rate 78 BPM  
 P-R Interval 262 ms QRS Duration 186 ms  
 Q-T Interval 490 ms QTC Calculation (Bezet) 558 ms Calculated P Axis 33 degrees Calculated R Axis -76 degrees Calculated T Axis 84 degrees Diagnosis    
  !! AGE AND GENDER SPECIFIC ECG ANALYSIS !! Sinus rhythm with 1st degree A-V block with Fusion complexes and Premature  
atrial complexes Left axis deviation Non-specific intra-ventricular conduction block Inferior infarct , age undetermined Anterolateral infarct , age undetermined Abnormal ECG When compared with ECG of 21-DEC-2019 22:36, Sinus rhythm has replaced Electronic ventricular pacemaker EKG, 12 LEAD, SUBSEQUENT Collection Time: 01/17/20  4:29 PM  
Result Value Ref Range Ventricular Rate 82 BPM  
 Atrial Rate 82 BPM  
 P-R Interval 280 ms QRS Duration 130 ms  
 Q-T Interval 438 ms QTC Calculation (Bezet) 511 ms Calculated P Axis -51 degrees Calculated R Axis -52 degrees Calculated T Axis -74 degrees Diagnosis    
  !! AGE AND GENDER SPECIFIC ECG ANALYSIS !! 
Unusual P axis, possible ectopic atrial rhythm Left axis deviation Left ventricular hypertrophy with QRS widening Inferior infarct (cited on or before 17-JAN-2020) Anterolateral infarct (cited on or before 17-JAN-2020) Abnormal ECG When compared with ECG of 17-JAN-2020 16:29, 
Ectopic atrial rhythm has replaced Sinus rhythm QRS duration has decreased Nonspecific T wave abnormality now evident in Inferior leads T wave inversion now evident in Anterior leads GLUCOSE, POC Collection Time: 01/17/20  4:50 PM  
Result Value Ref Range Glucose (POC) 82 65 - 100 mg/dL HGB & HCT Collection Time: 01/17/20  5:13 PM  
Result Value Ref Range HGB 6.7 (LL) 13.6 - 17.2 g/dL HCT 22.5 (L) 41.1 - 50.3 % GLUCOSE, POC  Collection Time: 01/17/20  7:38 PM  
 Result Value Ref Range Glucose (POC) 102 (H) 65 - 100 mg/dL GLUCOSE, POC Collection Time: 01/17/20 11:04 PM  
Result Value Ref Range Glucose (POC) 123 (H) 65 - 100 mg/dL HGB & HCT Collection Time: 01/18/20 12:15 AM  
Result Value Ref Range HGB 8.7 (L) 13.6 - 17.2 g/dL HCT 28.8 (L) 41.1 - 50.3 % CBC W/O DIFF Collection Time: 01/18/20  3:35 AM  
Result Value Ref Range WBC 17.0 (H) 4.3 - 11.1 K/uL  
 RBC 3.34 (L) 4.23 - 5.6 M/uL HGB 7.8 (L) 13.6 - 17.2 g/dL HCT 25.7 (L) 41.1 - 50.3 % MCV 76.9 (L) 79.6 - 97.8 FL  
 MCH 23.4 (L) 26.1 - 32.9 PG  
 MCHC 30.4 (L) 31.4 - 35.0 g/dL  
 RDW 18.6 (H) 11.9 - 14.6 % PLATELET 800 676 - 213 K/uL MPV 11.1 9.4 - 12.3 FL ABSOLUTE NRBC 0.00 0.0 - 0.2 K/uL METABOLIC PANEL, COMPREHENSIVE Collection Time: 01/18/20  3:35 AM  
Result Value Ref Range Sodium 140 136 - 145 mmol/L Potassium 4.7 3.5 - 5.1 mmol/L Chloride 110 (H) 98 - 107 mmol/L  
 CO2 25 21 - 32 mmol/L Anion gap 5 (L) 7 - 16 mmol/L Glucose 130 (H) 65 - 100 mg/dL BUN 21 8 - 23 MG/DL Creatinine 1.36 0.8 - 1.5 MG/DL  
 GFR est AA >60 >60 ml/min/1.73m2 GFR est non-AA 54 (L) >60 ml/min/1.73m2 Calcium 7.5 (L) 8.3 - 10.4 MG/DL Bilirubin, total 2.1 (H) 0.2 - 1.1 MG/DL  
 ALT (SGPT) 9 (L) 12 - 65 U/L  
 AST (SGOT) 14 (L) 15 - 37 U/L Alk. phosphatase 38 (L) 50 - 136 U/L Protein, total 5.0 (L) 6.3 - 8.2 g/dL Albumin 1.8 (L) 3.2 - 4.6 g/dL Globulin 3.2 2.3 - 3.5 g/dL A-G Ratio 0.6 (L) 1.2 - 3.5 MAGNESIUM Collection Time: 01/18/20  3:35 AM  
Result Value Ref Range Magnesium 1.6 (L) 1.8 - 2.4 mg/dL PROTHROMBIN TIME + INR Collection Time: 01/18/20  3:35 AM  
Result Value Ref Range Prothrombin time 18.7 (H) 11.7 - 14.5 sec INR 1.5 PTT Collection Time: 01/18/20  3:35 AM  
Result Value Ref Range aPTT 33.1 24.7 - 39.8 SEC  
GLUCOSE, POC Collection Time: 01/18/20  7:09 AM  
Result Value Ref Range Glucose (POC) 124 (H) 65 - 100 mg/dL HGB & HCT Collection Time: 01/18/20  9:13 AM  
Result Value Ref Range HGB 7.8 (L) 13.6 - 17.2 g/dL HCT 25.7 (L) 41.1 - 50.3 % All Micro Results None Current Meds: 
Current Facility-Administered Medications Medication Dose Route Frequency  magnesium sulfate 2 g/50 ml IVPB (premix or compounded)  2 g IntraVENous Q4H  
 famotidine (PF) (PEPCID) 20 mg in 0.9% sodium chloride 10 mL injection  20 mg IntraVENous Q12H  
 gabapentin (NEURONTIN) capsule 300 mg  300 mg Oral Q12H  
 HYDROcodone-acetaminophen (NORCO) 7.5-325 mg per tablet 1 Tab  1 Tab Oral Q6H PRN  
 insulin lispro (HUMALOG) injection   SubCUTAneous AC&HS  ondansetron (ZOFRAN) injection 4 mg  4 mg IntraVENous Q6H PRN  probenecid (BENEMID) tablet 500 mg  500 mg Oral Q12H  
 sotalol (BETAPACE) tablet 120 mg  120 mg Oral Q12H  
 0.9% sodium chloride infusion  75 mL/hr IntraVENous CONTINUOUS Other Studies: 
 
Ct Chest W Cont Result Date: 1/17/2020 CT chest with contrast History: Status post fall today, enlarging hematoma along right chest wall. . Technique: Helically acquired images were obtained from the lung apices to the domes of the diaphragms reconstructed at 5 mm thickness after the uneventful administration of 80 mL of intravenous Optiray-350 in order to better evaluate the mediastinal structures. Coronal reformatted images were submitted. Radiation dose reduction techniques were used for this study:  Our CT scanners use one or all of the following: Automated exposure control, adjustment of the mA and/or kVp according to patient's size, iterative reconstruction. Comparison: None. Correlation is made to the chest x-ray performed earlier on the same day. Findings: There is no pleural or pericardial effusion. The heart and great vessels are unremarkable. There are no airspace opacities.  There is mild peripheral interstitial prominence suggesting underlying interstitial lung disease. Within the left lower lobe on image 30 is a 5 mm nodule. Few additional calcified nodules are present. No adenopathy is present within the thorax. There is a large hematoma within the right breast measuring approximately 9.7 x 13.4 cm. There are areas of central decreased attenuation which can be seen as a result of active bleeding. There is diffuse edema with smaller nearby masses, presumably representing additional sites of hematoma more superficially. No acute rib fracture is present. Imaging of the upper abdomen reveals a low-density structure within the upper pole right kidney measuring 2.1 cm, not confidently characterized as a simple cyst. There is a 1.7 cm right adrenal gland nodule, incompletely characterized. IMPRESSION: 1. Large right chest/breast hematoma with findings raising suspicion for active bleeding. 2. Mild interstitial opacities suggesting underlying chronic change. 3. Left lower lobe nodule. If the patient is considered high-risk for malignancy, follow-up scanning in one year would be recommended. Otherwise no further follow-up is required. 4. Indeterminate low density upper pole left renal lesion. CT scanning using a renal mass protocol may be beneficial for further evaluation on a nonemergent basis. This could also assess the indeterminate right adrenal gland nodule. Xr Chest [facility] Result Date: 1/17/2020 Clinical History: The patient is a 70years year old Male presenting with symptoms of cough Comparison:  Chest x-ray 12/22/2019 Findings:  Frontal view of the chest was obtained. There is mildly improved pulmonary vascular congestion since prior exam, however with continued central vascular congestion. Small basilar effusions are suggested particularly on the right. The cardiomediastinal silhouette is within normal limits. There are no acute osseous abnormalities. A left subclavian pacing device remains in place. Impression:  CHF/volume overload however improved over prior exam. CPT code(s) 45157 Xr Knee Rt 3 V Result Date: 1/17/2020 Clinical History: The patient is a 70years year old Male presenting with symptoms of pain. Comparison:  none Findings: 4 views of the right knee were obtained. No fracture or dislocation is identified. There is moderate to severe medial joint space loss. No evidence of joint effusion is demonstrated. There is incidental vascular calcification. Impression: Moderate to severe medial joint space loss. Assessment and Plan:  
 
Hospital Problems as of 1/18/2020 Date Reviewed: 7/15/2019 Codes Class Noted - Resolved POA * (Principal) Fall ICD-10-CM: W19. Lilian Lay ICD-9-CM: E888.9  1/17/2020 - Present Yes Hematoma of right chest wall ICD-10-CM: H63.575L ICD-9-CM: 922.1  1/17/2020 - Present Yes Coagulopathy ([address] from Eliquis adn Indocin use ICD-10-CM: D68.9 ICD-9-CM: 286.9  1/17/2020 - Present Yes Acute pain of right knee ICD-10-CM: M25.561 ICD-9-CM: 719.46  1/17/2020 - Present Yes Debility ICD-10-CM: R53.81 ICD-9-CM: 799.3  1/17/2020 - Present Yes Fluid overload ICD-10-CM: E87.70 ICD-9-CM: 276.69  1/17/2020 - Present Yes A-fib [facility]) ICD-10-CM: I48.91 
ICD-9-CM: 427.31  1/17/2020 - Present Unknown Hypotension ICD-10-CM: I95.9 ICD-9-CM: 458.9  1/17/2020 - Present Unknown Anticoagulated ICD-10-CM: Z79.01 
ICD-9-CM: V58.61  1/17/2020 - Present Unknown Chest wall hematoma ICD-10-CM: S20.219A 
ICD-9-CM: 922.1  1/17/2020 - Present Unknown Blood loss anemia ICD-10-CM: D50.0 ICD-9-CM: 280.0  1/17/2020 - Present Unknown Acute blood loss anemia ICD-10-CM: D62 
ICD-9-CM: 285.1  1/17/2020 - Present Unknown Subcutaneous hematoma ICD-10-CM: T14. 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 
 
Acute blood loss anemia secondary to large hematoma in the right upper chest s/p Fall  
- CT chest showed large chest wall hematoma 
- CT head wothout acute bleed 
- hold Eliquis , aspirin will be stopped. - s/p 2 units of PRBC  
- H&H q8 
- General surgery on board. Hypotension : improved - hypotensive after receiving morphine - IVF 
- hold sotalol if BP still low Atrial Fibrillation 
- on sotalol q12h , hold if BP < 120 
- f/u cardio recs 
- holding eliquis Leukocytosis 
- no signs of acute infection, afebrile. Possible cellulitis of RLE? 
- per patient, RLE discoloration has been there for a while with some tenderness due to a fall. 
- monitor for now. Low threshold to start abx 
 
T2DM 
- home: Levemir 40 units 2 times per day and Humalog sliding scales. - c/w Humalog sliding scales for now 
  
Trauma of the right knee   
- XR: no fracture; 
- Monitor for now - pain control 
- avoid morpine 
  
 
Diet:  DIET CARDIAC 
DVT PPx: scds Code: Prior Medical Decision Making: 
 
Labs/Imaging reviewed. Additional information obtained from nursing staff. Patient is high risk due to medical condition and comorbidities. Plan discussed with nursing staff. Plan discussed with patient/family. All questions/concerns were addressed. Pt/family agrees with the plan. Signed By: Wang Roman MD   
 January 18, 2020

## 2020-01-18 NOTE — PROGRESS NOTES
Bedside, Verbal and Written shift change report given to [facility] RN (oncoming nurse) by [facility] RN (offgoing nurse). Report included the following information SBAR, Kardex, Intake/Output, MAR, Med Rec Status, Cardiac Rhythm paced and Alarm Parameters .

## 2020-01-18 NOTE — PROGRESS NOTES
Visit with patient to build rapport with . Calm Encouraged with presence and words of hope Preston Calvert,  Staff  C: [phone]  /  [email or web address]

## 2020-01-18 NOTE — PROGRESS NOTES
Bedside and Verbal shift change report given to [address] [place] [address] (oncoming nurse) by Hasmukh Deluna RN (offgoing nurse). Report included the following information SBAR, Kardex, ED Summary, Intake/Output, MAR and Recent Results.

## 2020-01-18 NOTE — PROGRESS NOTES
TRANSFER - IN REPORT: 
 
Verbal report received from [facility] RN on Edilson Pert  being received from ICU for routine progression of care Report consisted of patients Situation, Background, Assessment and  
Recommendations(SBAR). Information from the following report(s) SBAR, Kardex and MAR was reviewed with the receiving nurse. Opportunity for questions and clarification was provided. Assessment completed upon patients arrival to unit and care assumed.

## 2020-01-18 NOTE — CONSULTS
[facility] Cardiology Consult Date of  Admission: 1/17/2020  3:33 PM  
 
Primary Care Physician: Dr. Kay Coughlin  
Primary Cardiologist: Dr. Alva Pichardo and Placido Schwartz Referring Physician: Dr. Patricia Lucas Consulting Physician: Dr. Marisol Gross CC/Reason for consult:CAD, stent, A. Fib, BNP >2100, hypotension of unknown etiology, needing to hold eliquis for hematoma post fall, help with management. Chinedu Gonsales is a 70 y.o. male with prior h/o PAF on sotalol and eliquis, CAD, s/p PCI in 2014, chronic dHF, SSS s/p PM, CONNOR on CPAP. Saw Dr. Placido Schwartz last year for [facility] device. Patient presented to ED at [facility] after losing his balance and falling on his rollator hitting right side of chest. he arrived to the emergency room his vital signs were blood pressure of 100/52, heart rate of 76 respiratory rate of 16 O2 saturation of 97%. The patient was awake and alert. His lungs were clear to auscultation. He had a hematoma the size of cantaloupe in the upper part of his right chest.  Initial blood work-up reported a white blood cell count of 14.5, hemoglobin of 7.3, platelets of 783, his INR was 1.5, his sodium was 138, potassium 4.5, glucose 77, creatinine 1.3, total bilirubin of 0.3. His proBNP was 2000. General surgery was consulted they assessed the patient and recommended conservative management for now. The hospitalist team admitted. In Ed he became hypotensive requiring NS bolus. He also required 2 units of PRBCs. He was admitted to CCU and cardiology was consulted on 1/18/2020 for management of CAD, PAF, elevated BNP, and holding eliquis. Reviewed labs today and Hgb 7.8. His V/S today showed improvement in his B/P. Diagnosis  Acute respiratory failure ([address]  DM (diabetes mellitus) ([facility]  Post PTCA  Coronary atherosclerosis of native coronary artery  Hyperlipidemia other unsp dyslipidemia  Orthostatic hypotension  Cardiac pacemaker  Chronic diastolic heart failure ([address]  Malaise/fatigue/weakness/tiredness  Dyspnea/shortness of breath  Morbid obesity ([address]  Paroxysmal atrial fibrillation (HCC)  HTN - uncontrolled, malignant  Mixed hyperlipidemia  Hypoglycemia  Syncope  Fall  Hematoma of right chest wall  Coagulopathy (HCC) from Eliquis adn Indocin use  Acute pain of right knee  Debility  Fluid overload  A-fib ([address]  Hypotension  Anticoagulated  Chest wall hematoma  Blood loss anemia  Acute blood loss anemia  Subcutaneous hematoma Past Medical History:  
Diagnosis Date  Arrhythmia Paroxysmal Atrial fibrillation  Arthritis   
 hip and knee  CAD (coronary artery disease)  Cardiac pacemaker 11/12/2015  Chronic diastolic heart failure ([address] 11/12/2015  Chronic pain   
 hips & knees  Diabetes ([address]  Diabetes mellitus type II, uncontrolled ([address] adult onset  Dyspnea/shortness of breath 11/12/2015  GERD (gastroesophageal reflux disease)  Heart failure ([address]  HTN - uncontrolled, malignant 8/8/2016  Hyperlipidemia 11/12/2015  Hypertension   
 malignant, uncontrolled  Malaise/fatigue/weakness/tiredness 11/12/2015  Mixed hyperlipidemia  Morbid obesity ([address]  Orthostatic hypotension 11/12/2015  Other ill-defined conditions(799.89) mixed hyperlipidemia  Paroxysmal atrial fibrillation (HCC)   
 spontaneously converted from Afib to sinus with sotalol, no eCV  Permanent atrial fibrillation ([address] 11/12/2015  Tendon injury R foot  Unspecified sleep apnea Past Surgical History:  
Procedure Laterality Date  HX HERNIA REPAIR    
 HX OTHER SURGICAL    
 hemorrhoidectomy  HX PACEMAKER    
 OR LEFT HEART CATH,PERCUTANEOUS  3/31/2014 BMS to mid LAD Allergies Allergen Reactions  Clindamycin Other (comments) Severe joint pain  Demerol [Meperidine] Other (comments) Bradycardia  Losartan-Hydrochlorothiazide Unable to Obtain Per patient's pharmacy  Pcn [Penicillins] Rash Family History Problem Relation Age of Onset  Diabetes Mother  Cancer Father  Diabetes Brother  Hypertension Brother Current Facility-Administered Medications Medication Dose Route Frequency  magnesium sulfate 2 g/50 ml IVPB (premix or compounded)  2 g IntraVENous Q4H  
 famotidine (PF) (PEPCID) 20 mg in 0.9% sodium chloride 10 mL injection  20 mg IntraVENous Q12H  
 gabapentin (NEURONTIN) capsule 300 mg  300 mg Oral Q12H  
 HYDROcodone-acetaminophen (NORCO) 7.5-325 mg per tablet 1 Tab  1 Tab Oral Q6H PRN  
 insulin lispro (HUMALOG) injection   SubCUTAneous AC&HS  ondansetron (ZOFRAN) injection 4 mg  4 mg IntraVENous Q6H PRN  probenecid (BENEMID) tablet 500 mg  500 mg Oral Q12H  
 sotalol (BETAPACE) tablet 120 mg  120 mg Oral Q12H  
 0.9% sodium chloride infusion  75 mL/hr IntraVENous CONTINUOUS Review of Systems Constitution: Positive for malaise/fatigue. + frequent falls HENT: Negative for nosebleeds. Cardiovascular: Positive for chest pain and dyspnea on exertion. Negative for irregular heartbeat, near-syncope, palpitations and syncope. Respiratory: Negative for cough and shortness of breath. Musculoskeletal: Positive for muscle weakness. Gastrointestinal: Positive for nausea and vomiting. Neurological: Positive for dizziness and loss of balance. Psychiatric/Behavioral: Negative for altered mental status. Physical Exam 
Vitals:  
 01/18/20 1000 01/18/20 1014 01/18/20 1044 01/18/20 1109 BP: 115/54 108/56 114/56 105/51 Pulse: 75 75 79 81 Resp: 16 15 26 29 Temp:      
SpO2: 98% 97% 93% 95% Weight:      
 
 
Physical Exam: 
General: Well Developed, Well Nourished, No Acute Distress HEENT: pupils equal and round, no abnormalities noted Neck: supple, no JVD, no carotid bruits Heart: S1S2 with RRR without murmurs or gallops Lungs: Clear throughout auscultation bilaterally without adventitious sounds Chest:  Large hematoma right chest area Abd: soft, nontender, nondistended, with good bowel sounds Ext: warm, 1+ edema, calves supple/nontender, pulses 2+ bilaterally Skin: warm and dry Psychiatric: Normal mood and affect Neurologic: Alert and oriented X 3 Cardiographics Telemetry:atrial and biventricular pacing ECG: atrial and biventricular pacing Echocardiogram: in 2015 showed preserved LV function, mod LA size and mod LVH. Labs:  
Recent Labs  
  01/18/20 
0913 01/18/20 
0335  01/17/20 
1601 NA  --  140  --  138 K  --  4.7  --  4.5 MG  --  1.6*  --   --   
BUN  --  21  --  18  
CREA  --  1.36  --  1.34  
GLU  --  130*  --  77 WBC  --  17.0*  --  14.5* HGB 7.8* 7.8*   < > 7.3* HCT 25.7* 25.7*   < > 25.2*  
PLT  --  234  --  341 INR  --  1.5  --  1.5  
 < > = values in this interval not displayed. Assessment/Plan: 
 
 Assessment:  
  
Principal Problem: 
  Fall (1/17/2020)- s/p large hematoma and blood loss requiring transfusion. Would remain off eliquis and can see Dr. Law Toscano back in office to consider watchman device. Active Problems: 
  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 Hematoma of right chest wall (1/17/2020)- see above Coagulopathy ([address] from Eliquis adn Indocin use (1/17/2020) Acute pain of right knee (1/17/2020) Debility (1/17/2020) Fluid overload (1/17/2020) A-fib ([address] (1/17/2020)- currently AV pacing. Will need to remain on sotalol Hypotension (1/17/2020)- resolved; likely secondary from blood loss anemia from chest hematoma. Anticoagulated (1/17/2020) Chest wall hematoma (1/17/2020) Blood loss anemia (1/17/2020) Acute blood loss anemia (1/17/2020) Subcutaneous hematoma (1/17/2020) Thank you very much for this referral. We appreciate the opportunity to participate in this patient's care. We will follow along with above stated plan. Chidi Menjivar NP Consulting MD: Dr. Tong Valencia ATTENDING ADDENDUM: 
 
Patient seen and examined by me. Agree with above note by physician extender. Key findings are:  No CP or RUDD, AV pacing on tele and on sotalol. Remains anemic despite RBC's yesterday, consider repeat transfusion to get Hgb>9. Hold Eliquis for now. May need to consider cessation of [address] completely, as he admits to multiple falls in the past, 2 in the past month, and chronic equilibrium issues and gait instability even with walker. Discussed watchman again today, he decided against it last year but would be willing to have it now. We will make sure he sees Dr. Shirley Strange again quickly after discharge to re-evaluate. CV- RRR without murmur, pacer CDI; right chest with large hematoma, stable/shrinking per patient report Lungs- Clear bilaterally anteriorly Abd- soft, nontender, nondistended Ext- no edema Plan: As above. Continue sotalol without interruption, try to maintain NSR. Stop eliquis for now. Reassess in AM.  Consider transfusion to get Hgb >9-10. ALBIN Calhoun MD 
[facility] Cardiology Pager [phone]

## 2020-01-18 NOTE — PROGRESS NOTES
H&P/Consult Note/Progress Note/Office Note:  
bEenezer Franco  MRN: [id number]  EXY:[birth year]  Age:79 y.o. 
 
HPI: Ebenezer Franco is a 70 y.o. male who on Eliquis for afib who came to the ER on 1/17/20 after a fall 1-2 hrs prior to arrival. 
He also takes Indocin He had swelling and hematoma of the right upper anterior chest 
He fell from a standing position while using a Rollator and landed on the device and hit is right anterior chest.  
He developed progressive severe swelling and hematoma He is on Eliquis for atrial fibrillation. Over the past 2 hours since the fall the hematoma has expanded to a small melon. Very painful and firm. No SOB. Denies any hip or back pain. Also has associated right knee pain No LOC or head trauma No blood loss. Point of impact: right chest wall. Pain location: right chest wall. The pain is moderate. Pertinent negatives include no visual change, no fever, no numbness, no abdominal pain, no nausea, no vomiting, no hematuria, no headaches The risk factors include being elderly. The symptoms are aggravated by pressure on injury. He has tried nothing for the symptoms. CXR and right knee xrays as below. Labs are pending 1/17/20 CXR, 1 views Hx: cough Comparison:  CXR 12/22/19 
  
There is mildly improved pulmonary vascular congestion since prior exam, however 
with continued central vascular congestion. Small basilar effusions are 
suggested particularly on the right. The cardiomediastinal silhouette is within 
normal limits. There are no acute osseous abnormalities. A left subclavian 
pacing device remains in place. 
  
Impression:  CHF/volume overload however improved over prior exam. 
  
 
  
1/17/20 right knee, 4 views No fracture or dislocation is identified. There is moderate to severe medial joint space loss. No evidence of joint effusion is demonstrated.   
There is incidental vascular calcification. 
  
 Impression: Moderate to severe medial joint space loss. 
  
 
1/18/20 some hypotension overnight; BNP>2100; cardiology consulted; Hgb stable; right chest wall hematoma much smaller; no new areas of pain after fall prior to admission Past Medical History:  
Diagnosis Date  Arrhythmia Paroxysmal Atrial fibrillation  Arthritis   
 hip and knee  CAD (coronary artery disease)  Cardiac pacemaker 11/12/2015  Chronic diastolic heart failure ([address] 11/12/2015  Chronic pain   
 hips & knees  Diabetes ([address]  Diabetes mellitus type II, uncontrolled ([address] adult onset  Dyspnea/shortness of breath 11/12/2015  GERD (gastroesophageal reflux disease)  Heart failure ([address]  HTN - uncontrolled, malignant 8/8/2016  Hyperlipidemia 11/12/2015  Hypertension   
 malignant, uncontrolled  Malaise/fatigue/weakness/tiredness 11/12/2015  Mixed hyperlipidemia  Morbid obesity ([address]  Orthostatic hypotension 11/12/2015  Other ill-defined conditions(799.89) mixed hyperlipidemia  Paroxysmal atrial fibrillation (HCC)   
 spontaneously converted from Afib to sinus with sotalol, no eCV  Permanent atrial fibrillation ([address] 11/12/2015  Tendon injury R foot  Unspecified sleep apnea Past Surgical History:  
Procedure Laterality Date  HX HERNIA REPAIR    
 HX OTHER SURGICAL    
 hemorrhoidectomy  HX PACEMAKER    
 UT LEFT HEART CATH,PERCUTANEOUS  3/31/2014 BMS to mid LAD Current Facility-Administered Medications Medication Dose Route Frequency  magnesium sulfate 2 g/50 ml IVPB (premix or compounded)  2 g IntraVENous Q4H  
 famotidine (PF) (PEPCID) 20 mg in 0.9% sodium chloride 10 mL injection  20 mg IntraVENous Q12H  
 gabapentin (NEURONTIN) capsule 300 mg  300 mg Oral Q12H  
 HYDROcodone-acetaminophen (NORCO) 7.5-325 mg per tablet 1 Tab  1 Tab Oral Q6H PRN  
 insulin lispro (HUMALOG) injection   SubCUTAneous AC&HS  
  ondansetron (ZOFRAN) injection 4 mg  4 mg IntraVENous Q6H PRN  probenecid (BENEMID) tablet 500 mg  500 mg Oral Q12H  
 sotalol (BETAPACE) tablet 120 mg  120 mg Oral Q12H  
 0.9% sodium chloride infusion  75 mL/hr IntraVENous CONTINUOUS Clindamycin; Demerol [meperidine]; Losartan-hydrochlorothiazide; and Pcn [penicillins] Social History Socioeconomic History  Marital status:  Spouse name: Not on file  Number of children: Not on file  Years of education: Not on file  Highest education level: Not on file Tobacco Use  Smoking status: Former Smoker Years: 8. Last attempt to quit: 3/25/1970 Years since quittin.8  Smokeless tobacco: Never Used Substance and Sexual Activity  Alcohol use: No  
 Drug use: No  
 
Social History Tobacco Use Smoking Status Former Smoker  Years: .  Last attempt to quit: 3/25/1970  Years since quittin.8 Smokeless Tobacco Never Used Family History Problem Relation Age of Onset  Diabetes Mother  Cancer Father  Diabetes Brother  Hypertension Brother ROS: The patient has no difficulty with chest pain or shortness of breath. No fever or chills. Comprehensive review of systems was otherwise unremarkable except as noted above. Physical Exam:  
Visit Vitals /56 Pulse 78 Temp 98.4 °F (36.9 °C) Resp 27 Wt 335 lb 12.2 oz (152.3 kg) SpO2 99% BMI 41.97 kg/m² Vitals:  
 20 0431 20 0700 20 0714 20 9086 BP: 107/54 103/51 112/55 110/56 Pulse: 80 75 75 78 Resp:  27 Temp:   98.4 °F (36.9 °C) SpO2: 99% 97% 98% 99% Weight:      
 
No intake/output data recorded.  1901 -  0700 In: 1994.6 [I.V.:1104.2] Out: 350 [Urine:350] Constitutional: Alert, weak, cooperative patient in no acute distress; appears stated age Eyes:Sclera are clear. EOMs intact ENMT: no external lesions gross hearing normal; no obvious neck masses, no ear or lip lesions, nares normal 
CV: RRR. Normal perfusion Resp: No JVD. Breathing is  non-labored; no audible wheezing. Large hematoma of right anterior chest size of a azael--->much smaller by 1/18/20 No external bleeding GI: soft and non-distended Musculoskeletal: weak and deconditioned. No embolic signs or cyanosis. Right knee tender to palpation Neuro:  Oriented; moves all 4; no focal deficits Psychiatric: normal affect and mood, no memory impairment Recent vitals (if inpt): 
Patient Vitals for the past 24 hrs: 
 BP Temp Pulse Resp SpO2 Weight 01/18/20 0729 110/56  78 27 99 %   
01/18/20 0714 112/55 98.4 °F (36.9 °C) 75 19 98 %   
01/18/20 0700 103/51  75 25 97 %   
01/18/20 0632 107/54  80 20 99 %   
01/18/20 0614 99/48  75 18 99 %   
01/18/20 0601 117/57  78 22 99 %   
01/18/20 0545 94/50  77 20 98 %   
01/18/20 0530 (!) 89/45  75 19 99 %   
01/18/20 0514 94/50    98 %   
01/18/20 0513   75 23    
01/18/20 0500 (!) 88/52  75 18 99 %   
01/18/20 0445 (!) 84/46  82 20 (!) 89 %   
01/18/20 0430 (!) 88/51  75 18 94 %   
01/18/20 0414 (!) 87/43  75 19 92 %   
01/18/20 0400 106/54  78 23 95 %   
01/18/20 0345 110/59  78 25 95 %   
01/18/20 0330 113/57  76  95 %   
01/18/20 0314 101/57    91 %   
01/18/20 0301   77 23    
01/18/20 0300 98/51 97.3 °F (36.3 °C)   97 %   
01/18/20 0245 96/55  81 19 95 %   
01/18/20 0230 93/50    97 %   
01/18/20 0229   75 21    
01/18/20 0214 93/50  79 20 94 %   
01/18/20 0200 90/51  76 19 93 %   
01/18/20 0146 90/53  79 13 95 %   
01/18/20 0130 106/57  77 17 96 %   
01/18/20 0114 101/54  76 22 92 %   
01/18/20 0045 100/54  78 9 95 %   
01/18/20 0032 (!) 81/47  77 (!) 7 95 %   
01/18/20 0014 109/55    99 %   
01/18/20 0000 115/57  77 16 98 %   
01/17/20 2345 114/55    97 %   
01/17/20 2344   77 12 98 %   
 01/17/20 2330 125/61  83 16 98 %   
01/17/20 2314 115/58 97.6 °F (36.4 °C) 78 14 98 %   
01/17/20 2301   80 22    
01/17/20 2300 119/68    98 %   
01/17/20 2245 111/57  77 18 96 %   
01/17/20 2230 113/58  76 21 94 %   
01/17/20 2214 121/57  76 16 96 %   
01/17/20 2200 132/58  80 16 95 %   
01/17/20 2145 102/52  76 17 95 %   
01/17/20 2130 99/52  76 8 96 %   
01/17/20 2123  97.4 °F (36.3 °C)      
01/17/20 2114 109/55  75 17 93 %   
01/17/20 2059 103/55  78 9 98 %   
01/17/20 2045 109/56  75 19 96 %   
01/17/20 2030 118/57  77 18 98 %   
01/17/20 2014 101/55  80 13    
01/17/20 2009 (!) 72/51  99 9 (!) 74 %   
01/17/20 2004 (!) 70/50  99 19 93 %   
01/17/20 1959 (!) 67/47  100 9 96 %   
01/17/20 1953 (!) 67/41  (!) 101 (!) 7 96 %   
01/17/20 1946 (!) 73/37  (!) 101 20 97 %   
01/17/20 1931 (!) 58/41  (!) 101 8 93 %   
01/17/20 1914 (!) 80/50 97.6 °F (36.4 °C) (!) 102 21    
01/17/20 1908      335 lb 12.2 oz (152.3 kg) 01/17/20 1904 (!) 82/57  (!) 102     
01/17/20 1834 98/55  Britany Upshur    
01/17/20 1829 98/53       
01/17/20 1824 (!) 76/44    95 %   
01/17/20 1754     98 %   
01/17/20 1752 (!) 73/46       
01/17/20 1655 (!) 84/47       
01/17/20 1650     98 %   
01/17/20 1635 (!) 87/52    91 %   
01/17/20 1615 (!) 89/44    93 %   
01/17/20 1541 100/52 97.6 °F (36.4 °C) 76 16 97 % 330 lb (149.7 kg) Labs: 
Recent Labs  
  01/18/20 
0335 WBC 17.0* HGB 7.8*    
K 4.7 * CO2 25 BUN 21  
CREA 1.36  
* PTP 18.7* INR 1.5 APTT 33.1 TBILI 2.1*  
SGOT 14* ALT 9* AP 38* Lab Results Component Value Date/Time  WBC 17.0 (H) 01/18/2020 03:35 AM  
 HGB 7.8 (L) 01/18/2020 03:35 AM  
 PLATELET 910 64/04/8458 03:35 AM  
 Sodium 140 01/18/2020 03:35 AM  
 Potassium 4.7 01/18/2020 03:35 AM  
 Chloride 110 (H) 01/18/2020 03:35 AM  
 CO2 25 01/18/2020 03:35 AM  
 BUN 21 01/18/2020 03:35 AM  
 Creatinine 1.36 01/18/2020 03:35 AM  
 Glucose 130 (H) 01/18/2020 03:35 AM  
 INR 1.5 01/18/2020 03:35 AM  
 aPTT 33.1 01/18/2020 03:35 AM  
 Bilirubin, total 2.1 (H) 01/18/2020 03:35 AM  
 AST (SGOT) 14 (L) 01/18/2020 03:35 AM  
 ALT (SGPT) 9 (L) 01/18/2020 03:35 AM  
 Alk. phosphatase 38 (L) 01/18/2020 03:35 AM  
 Troponin-I, Qt. <0.02 (L) 12/21/2019 10:05 PM  
 
 
CT Results  (Last 48 hours) 01/17/20 1848  CT CHEST W CONT Final result Impression:  IMPRESSION:  
1. Large right chest/breast hematoma with findings raising suspicion for active  
bleeding. 2. Mild interstitial opacities suggesting underlying chronic change. 3. Left lower lobe nodule. If the patient is considered high-risk for  
malignancy, follow-up scanning in one year would be recommended. Otherwise no  
further follow-up is required. 4. Indeterminate low density upper pole left renal lesion. CT scanning using a  
renal mass protocol may be beneficial for further evaluation on a nonemergent  
basis. This could also assess the indeterminate right adrenal gland nodule. Narrative:  CT chest with contrast   
   
History: Status post fall today, enlarging hematoma along right chest wall. Dustin Richardson Technique: Helically acquired images were obtained from the lung apices to the  
domes of the diaphragms reconstructed at 5 mm thickness after the uneventful  
administration of 80 mL of intravenous Optiray-350 in order to better evaluate  
the mediastinal structures. Coronal reformatted images were submitted. Radiation dose reduction techniques were used for this study:  Our CT scanners  
use one or all of the following: Automated exposure control, adjustment of the  
mA and/or kVp according to patient's size, iterative reconstruction. Comparison: None. Correlation is made to the chest x-ray performed earlier on  
the same day. Findings: There is no pleural or pericardial effusion. The heart and great  
vessels are unremarkable. There are no airspace opacities. There is mild  
peripheral interstitial prominence suggesting underlying interstitial lung  
disease. Within the left lower lobe on image 30 is a 5 mm nodule. Few additional  
calcified nodules are present. No adenopathy is present within the thorax. There is a large hematoma within the right breast measuring approximately 9.7 x  
13.4 cm. There are areas of central decreased attenuation which can be seen as  
a result of active bleeding. There is diffuse edema with smaller nearby masses,  
presumably representing additional sites of hematoma more superficially. No  
acute rib fracture is present. Imaging of the upper abdomen reveals a low-density structure within the upper  
pole right kidney measuring 2.1 cm, not confidently characterized as a simple  
cyst. There is a 1.7 cm right adrenal gland nodule, incompletely characterized. chest X-ray I reviewed recent labs, recent radiologic studies, and pertinent records including other doctor notes if needed. I independently reviewed radiology images for studies I described above or studies I have ordered. Admission date (for inpatients): 1/17/2020 * No surgery found *  * No surgery found * ASSESSMENT/PLAN: 
Problem List  Date Reviewed: 7/15/2019 Codes Class Noted * (Principal) Fall ICD-10-CM: W19. Jimmye Rise ICD-9-CM: B002.7  1/17/2020 Hematoma of right chest wall ICD-10-CM: D95.924X ICD-9-CM: 922.1  1/17/2020 Coagulopathy ([address] from Eliquis adn Indocin use ICD-10-CM: D68.9 ICD-9-CM: 286.9  1/17/2020 Acute pain of right knee ICD-10-CM: M25.561 ICD-9-CM: 719.46  1/17/2020 Debility ICD-10-CM: R53.81 ICD-9-CM: 799.3  1/17/2020 Fluid overload ICD-10-CM: E87.70 ICD-9-CM: 276.69  [facility]  [facility] ICD-10-CM: I48.91 
 ICD-9-CM: 427.31  1/17/2020 Hypotension ICD-10-CM: I95.9 ICD-9-CM: 458.9  1/17/2020 Anticoagulated ICD-10-CM: Z79.01 
ICD-9-CM: V58.61  1/17/2020 Chest wall hematoma ICD-10-CM: S20.219A 
ICD-9-CM: 922.1  1/17/2020 Blood loss anemia ICD-10-CM: D50.0 ICD-9-CM: 280.0  1/17/2020 Acute blood loss anemia ICD-10-CM: D62 
ICD-9-CM: 285.1  1/17/2020 Subcutaneous hematoma ICD-10-CM: T14. Trena Kenyon ICD-9-CM: 924.9  1/17/2020 Hypoglycemia ICD-10-CM: E16.2 ICD-9-CM: 251.2  12/22/2019 Syncope ICD-10-CM: R55 
ICD-9-CM: 780.2  12/22/2019 Morbid obesity ([facility] ICD-10-CM: E66.01 
ICD-9-CM: 278.01  Unknown Paroxysmal atrial fibrillation (HCC) ICD-10-CM: I48.0 ICD-9-CM: 427.31  Unknown Overview Signed 8/8/2016 11:37 AM by Jia Cat  
  spontaneously converted from Afib to sinus with sotalol, no eCV HTN - uncontrolled, malignant ICD-10-CM: I10 
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 
ICD-9-CM: 428.32  11/12/2015 Overview Signed 11/12/2015 10:24 AM by Jaya Epperson Echo 8/15:  EF 50-55%, mild MR Echo 3/14:  EF 50-55%, mild to mod MR and TR, RVSP 46 mmHg A/C DHF 3/2014 admission, before [address] [facility] Malaise/fatigue/weakness/tiredness ICD-10-CM: R22.13 ICD-9-CM: 780.79  11/12/2015 Dyspnea/shortness of breath ICD-10-CM: R06.00 
ICD-9-CM: 786.09  11/12/2015 Post PTCA ICD-10-CM: W21.33 ICD-9-CM: V45.82  4/1/2014 Coronary atherosclerosis of native coronary artery ICD-10-CM: I25.10 ICD-9-CM: 414.01  4/1/2014 Overview Signed 11/12/2015 10:21 AM by Cirilo Betancur [facility] 3/31/14: PCI of mid LAD with BMS, mild dz in other vessels Acute respiratory failure ([facility] ICD-10-CM: J96.00 
ICD-9-CM: 518.81  3/28/2014 DM (diabetes mellitus) ([facility] [address] ICD-10-CM: E11.9 ICD-9-CM: 250.00  3/28/2014 Principal Problem: 
  Fall (1/17/2020) Active Problems: 
  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 Hematoma of right chest wall (1/17/2020) Coagulopathy ([facility] [address] from Eliquis adn Indocin use (1/17/2020) Acute pain of right knee (1/17/2020) Debility (1/17/2020) Fluid overload (1/17/2020) A-fib ([facility] [address] (1/17/2020) Hypotension (1/17/2020) Anticoagulated (1/17/2020) Chest wall hematoma (1/17/2020) Blood loss anemia (1/17/2020) Acute blood loss anemia (1/17/2020) Subcutaneous hematoma (1/17/2020) Fall on anticoagulants (Eliquis and Indocin) with large right chest wall hematoma Debilitated patient with mult comorbidities In CCU 
BNP>2100 Some hypotension overnight Consult cardiology for help Will need to continue to hod Eliquis INR is 1.5 for no apparent reason Serial Hgb Transfuse prn; defer to Hospitalists and cardiology PO cardiac diet OK with surgery after cardiology sees him Hypomagnesemia 
2gm Mag sulfate IVPB x 2 ordered today Coagulopathy secondary to Eliquis and NSAID use Hold Eliquis, NSAIS, ASA and all anticoagulants for now Debility/Fall Consult PT May need SNF Acute right knee pain after fall Xray with no fracture Pain meds prn I have personally performed a face-to-face diagnostic evaluation and management  service on this patient. I have independently seen the patient. I have independently obtained the above history from the patient/family. I have independently examined the patient with above findings. I have independently reviewed data/labs for this patient and developed the above plan of care (MDM). Signed: Abdifatah Wheeler.  Prudence Chang MD, FACS

## 2020-01-19 NOTE — PROGRESS NOTES
END OF SHIFT NOTE: 
 
INTAKE/OUTPUT 
01/18 0701 - 01/19 0700 In: -  
Out: 700 [Urine:700] Voiding: YES Catheter: NO 
Drain:   
 
 
 
 
 
Flatus: Patient does have flatus present. Stool:  0 occurrences. Characteristics: 
  
 
Emesis: 0 occurrences. Characteristics: VITAL SIGNS Patient Vitals for the past 12 hrs: 
 Temp Pulse Resp BP SpO2  
01/19/20 0319 98.5 °F (36.9 °C) 75 20 118/62 95 % 01/19/20 0022 98.4 °F (36.9 °C) 75 23 124/67   
01/18/20 2051    119/68   
01/18/20 1947 98.4 °F (36.9 °C) 88 23 119/68 97 % Pain Assessment Pain Intensity 1: 0 (01/18/20 1145) Pain Location 1: Back Patient Stated Pain Goal: 0 Ambulating No 
 
Shift report given to oncoming nurse at the bedside.  
 
Rashawn Rahman RN

## 2020-01-19 NOTE — PROGRESS NOTES
Date of Outreach Update: 
Aden Estes was seen and assessed. MEWS Score: 2 (01/19/20 0022) Vitals:  
 01/18/20 1947 01/18/20 2051 01/19/20 0022 01/19/20 0319 BP: 119/68 119/68 124/67 118/62 Pulse: 88  75 75 Resp: 23  23 20 Temp: 98.4 °F (36.9 °C)  98.4 °F (36.9 °C) 98.5 °F (36.9 °C) SpO2: 97%   95% Weight:      
  
 
 Pain Assessment Pain Intensity 1: 0 (01/18/20 1145) Pain Location 1: Back Patient Stated Pain Goal: 0 Pt alert in bed, discusses desire to go home today. Vital signs and Hgb stable. Previous Outreach assessment has been reviewed. There have been no significant clinical changes since the completion of the last dated Outreach assessment. Will continue to follow up per outreach protocol. Signed By:   Lieutenant Sabillon   January 19, 2020 5:46 AM

## 2020-01-19 NOTE — PROGRESS NOTES
Hospitalist Note Admit Date:  2020  3:33 PM  
Name:  Sarmad Mandujano Age:  70 y.o. 
:  1940 MRN:  [id number] PCP:  Rd Kang MD 
Treatment Team: Attending Provider: Pawel Nolan MD; Surgeon: Dalia Le MD; Consulting Provider: Marry Juárez MD; Utilization Review: Mamta Li RN; Physical Therapist: Gayle De La Garza, PT, DPT 
 
HPI/Subjective:  
Patient is seen and examined at bedside. No acute events overnight. Patient reports feeling weak in his LEs as well as his neck. States he had PT at home but he hasnt been able to participate much with them. Vitals are stable. Saturating well on room air. No acute complaint except for lower extremity weakness and neck weakness. Denies any chest discomfort, shortness of breath. Objective:  
 
Patient Vitals for the past 24 hrs: 
 Temp Pulse Resp BP SpO2  
20 1148 98.2 °F (36.8 °C) 79 22 121/71 93 % 20 0656 98.4 °F (36.9 °C) (!) 102 22 129/64 98 % 20 0319 98.5 °F (36.9 °C) 75 20 118/62 95 % 20 0022 98.4 °F (36.9 °C) 75 23 124/67   
20 2051    119/68   
20 1947 98.4 °F (36.9 °C) 88 23 119/68 97 % 20 1609 98.5 °F (36.9 °C) 76 28 120/70 100 % 20 1314  83 (!) 34 102/50 95 % 20 1300  77 (!) 32 115/55 95 % 20 1249  79  116/56   
20 1245  79 17 116/56 96 % 20 1230  80 (!) 36 121/60 98 % 20 1215  75 23 121/58 99 % Oxygen Therapy O2 Sat (%): 93 % (20 1148) Pulse via Oximetry: 84 beats per minute (20 1314) O2 Device: Room air (20 1043) O2 Flow Rate (L/min): 2 l/min (20 0714) Estimated body mass index is 41.97 kg/m² as calculated from the following: 
  Height as of 19: 6' 3\" (1.905 m). Weight as of this encounter: 152.3 kg (335 lb 12.2 oz). Intake/Output Summary (Last 24 hours) at 2020 1212 Last data filed at 2020 2830 Gross per 24 hour Intake  Output 875 ml Net -875 ml *Note that automatically entered I/Os may not be accurate; dependent on patient compliance with collection and accurate  by techs. Physical Exam:  
General:     alert, awake, no acute distress. Well nourished, head hunched forward(states its normal) Head:   normocephalic, atraumatic Eyes, Ears, nose: PERRL, EOMI. Neck:    supple, non-tender Lungs:   CTAB, no wheezing, rhonchi, rales Cardiac:   RRR, Normal S1 and S2. Right chest wall hematoma, slightly tender to palpation. Abdomen:   Soft, non distended, nontender, +BS, no guarding/rebound Extremities:   Warm, dry. No edema. RLE larger than LLE(normal per pt) some chronic discoloration in the RLE, non tender to palpation, no warmth Skin:   No rashes, no jaundice, some chronic discoloration in the RLE Neuro:  Alert and oriented to person, time, place, situation. No gross focal neurological deficit Psychiatric:  No anxiety, calm, cooperative Data Review: 
I have reviewed all labs, meds, and studies from the last 24 hours: 
 
Recent Results (from the past 24 hour(s)) GLUCOSE, POC Collection Time: 01/18/20 12:15 PM  
Result Value Ref Range Glucose (POC) 130 (H) 65 - 100 mg/dL HGB & HCT Collection Time: 01/18/20  2:45 PM  
Result Value Ref Range HGB 7.8 (L) 13.6 - 17.2 g/dL HCT 25.8 (L) 41.1 - 50.3 % GLUCOSE, POC Collection Time: 01/18/20  5:11 PM  
Result Value Ref Range Glucose (POC) 179 (H) 65 - 100 mg/dL GLUCOSE, POC Collection Time: 01/18/20 10:01 PM  
Result Value Ref Range Glucose (POC) 170 (H) 65 - 100 mg/dL HGB & HCT Collection Time: 01/18/20 10:41 PM  
Result Value Ref Range HGB 7.2 (L) 13.6 - 17.2 g/dL HCT 23.9 (L) 41.1 - 50.3 % CBC W/O DIFF Collection Time: 01/19/20  5:15 AM  
Result Value Ref Range WBC 11.5 (H) 4.3 - 11.1 K/uL  
 RBC 3.15 (L) 4.23 - 5.6 M/uL HGB 7.5 (L) 13.6 - 17.2 g/dL HCT 24.5 (L) 41.1 - 50.3 % MCV 77.8 (L) 79.6 - 97.8 FL  
 MCH 23.8 (L) 26.1 - 32.9 PG  
 MCHC 30.6 (L) 31.4 - 35.0 g/dL  
 RDW 19.5 (H) 11.9 - 14.6 % PLATELET 931 516 - 883 K/uL MPV 10.6 9.4 - 12.3 FL ABSOLUTE NRBC 0.00 0.0 - 0.2 K/uL METABOLIC PANEL, COMPREHENSIVE Collection Time: 01/19/20  5:15 AM  
Result Value Ref Range Sodium 139 136 - 145 mmol/L Potassium 4.9 3.5 - 5.1 mmol/L Chloride 110 (H) 98 - 107 mmol/L  
 CO2 25 21 - 32 mmol/L Anion gap 4 (L) 7 - 16 mmol/L Glucose 120 (H) 65 - 100 mg/dL BUN 23 8 - 23 MG/DL Creatinine 1.53 (H) 0.8 - 1.5 MG/DL  
 GFR est AA 57 (L) >60 ml/min/1.73m2 GFR est non-AA 47 (L) >60 ml/min/1.73m2 Calcium 8.1 (L) 8.3 - 10.4 MG/DL Bilirubin, total 0.8 0.2 - 1.1 MG/DL  
 ALT (SGPT) 12 12 - 65 U/L  
 AST (SGOT) 26 15 - 37 U/L Alk. phosphatase 38 (L) 50 - 136 U/L Protein, total 5.4 (L) 6.3 - 8.2 g/dL Albumin 2.1 (L) 3.2 - 4.6 g/dL Globulin 3.3 2.3 - 3.5 g/dL A-G Ratio 0.6 (L) 1.2 - 3.5 GLUCOSE, POC Collection Time: 01/19/20  5:44 AM  
Result Value Ref Range Glucose (POC) 140 (H) 65 - 100 mg/dL GLUCOSE, POC Collection Time: 01/19/20  7:46 AM  
Result Value Ref Range Glucose (POC) 129 (H) 65 - 100 mg/dL GLUCOSE, POC Collection Time: 01/19/20 11:54 AM  
Result Value Ref Range Glucose (POC) 145 (H) 65 - 100 mg/dL All Micro Results None Current Meds: 
Current Facility-Administered Medications Medication Dose Route Frequency  famotidine (PF) (PEPCID) 20 mg in 0.9% sodium chloride 10 mL injection  20 mg IntraVENous Q12H  
 gabapentin (NEURONTIN) capsule 300 mg  300 mg Oral Q12H  
 HYDROcodone-acetaminophen (NORCO) 7.5-325 mg per tablet 1 Tab  1 Tab Oral Q6H PRN  
 insulin lispro (HUMALOG) injection   SubCUTAneous AC&HS  ondansetron (ZOFRAN) injection 4 mg  4 mg IntraVENous Q6H PRN  probenecid (BENEMID) tablet 500 mg  500 mg Oral Q12H  sotalol (BETAPACE) tablet 120 mg  120 mg Oral Q12H  
 0.9% sodium chloride infusion  75 mL/hr IntraVENous CONTINUOUS Other Studies: 
 
Ct Chest W Cont Result Date: 1/17/2020 CT chest with contrast History: Status post fall today, enlarging hematoma along right chest wall. . Technique: Helically acquired images were obtained from the lung apices to the domes of the diaphragms reconstructed at 5 mm thickness after the uneventful administration of 80 mL of intravenous Optiray-350 in order to better evaluate the mediastinal structures. Coronal reformatted images were submitted. Radiation dose reduction techniques were used for this study:  Our CT scanners use one or all of the following: Automated exposure control, adjustment of the mA and/or kVp according to patient's size, iterative reconstruction. Comparison: None. Correlation is made to the chest x-ray performed earlier on the same day. Findings: There is no pleural or pericardial effusion. The heart and great vessels are unremarkable. There are no airspace opacities. There is mild peripheral interstitial prominence suggesting underlying interstitial lung disease. Within the left lower lobe on image 30 is a 5 mm nodule. Few additional calcified nodules are present. No adenopathy is present within the thorax. There is a large hematoma within the right breast measuring approximately 9.7 x 13.4 cm. There are areas of central decreased attenuation which can be seen as a result of active bleeding. There is diffuse edema with smaller nearby masses, presumably representing additional sites of hematoma more superficially. No acute rib fracture is present. Imaging of the upper abdomen reveals a low-density structure within the upper pole right kidney measuring 2.1 cm, not confidently characterized as a simple cyst. There is a 1.7 cm right adrenal gland nodule, incompletely characterized. IMPRESSION: 1.  Large right chest/breast hematoma with findings raising suspicion for active bleeding. 2. Mild interstitial opacities suggesting underlying chronic change. 3. Left lower lobe nodule. If the patient is considered high-risk for malignancy, follow-up scanning in one year would be recommended. Otherwise no further follow-up is required. 4. Indeterminate low density upper pole left renal lesion. CT scanning using a renal mass protocol may be beneficial for further evaluation on a nonemergent basis. This could also assess the indeterminate right adrenal gland nodule. Xr Chest Gilbert Lust Result Date: 1/17/2020 Clinical History: The patient is a 70years year old Male presenting with symptoms of cough Comparison:  Chest x-ray 12/22/2019 Findings:  Frontal view of the chest was obtained. There is mildly improved pulmonary vascular congestion since prior exam, however with continued central vascular congestion. Small basilar effusions are suggested particularly on the right. The cardiomediastinal silhouette is within normal limits. There are no acute osseous abnormalities. A left subclavian pacing device remains in place. Impression:  CHF/volume overload however improved over prior exam. CPT code(s) 68761 Xr Knee Rt 3 V Result Date: 1/17/2020 Clinical History: The patient is a 70years year old Male presenting with symptoms of pain. Comparison:  none Findings: 4 views of the right knee were obtained. No fracture or dislocation is identified. There is moderate to severe medial joint space loss. No evidence of joint effusion is demonstrated. There is incidental vascular calcification. Impression: Moderate to severe medial joint space loss. Assessment and Plan:  
 
Hospital Problems as of 1/19/2020 Date Reviewed: 7/15/2019 Codes Class Noted - Resolved POA * (Principal) Fall ICD-10-CM: W19. Weyman Hamper ICD-9-CM: E888.9  1/17/2020 - Present Yes Hematoma of right chest wall ICD-10-CM: N43.062Y ICD-9-CM: 922.1  1/17/2020 - Present Yes Coagulopathy ([facility] [address] from Eliquis adn Indocin use ICD-10-CM: D68.9 ICD-9-CM: 286.9  1/17/2020 - Present Yes Acute pain of right knee ICD-10-CM: M25.561 ICD-9-CM: 719.46  1/17/2020 - Present Yes Debility ICD-10-CM: R53.81 ICD-9-CM: 799.3  1/17/2020 - Present Yes Fluid overload ICD-10-CM: E87.70 ICD-9-CM: 276.69  1/17/2020 - Present Yes A-fib [facility]) ICD-10-CM: I48.91 
ICD-9-CM: 427.31  1/17/2020 - Present Unknown Hypotension ICD-10-CM: I95.9 ICD-9-CM: 458.9  1/17/2020 - Present Unknown Anticoagulated ICD-10-CM: Z79.01 
ICD-9-CM: V58.61  1/17/2020 - Present Unknown Chest wall hematoma ICD-10-CM: S20.219A 
ICD-9-CM: 922.1  1/17/2020 - Present Unknown Blood loss anemia ICD-10-CM: D50.0 ICD-9-CM: 280.0  1/17/2020 - Present Unknown Acute blood loss anemia ICD-10-CM: D62 
ICD-9-CM: 285.1  1/17/2020 - Present Unknown Subcutaneous hematoma ICD-10-CM: T14. 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 
 
Acute blood loss anemia secondary to large hematoma in the right upper chest s/p Fall  
- CT chest showed large chest wall hematoma 
- CT head wothout acute bleed 
- hold Eliquis , aspirin will be stopped. - s/p 2 units of PRBC  
 
- General surgery on board. Hypotension : resolved Atrial Fibrillation 
- on sotalol q12h  
- f/u cardio recs 
- holding eliquis - will need to see  in office for watchman procedure Leukocytosis: improving 
- no signs of acute infection, afebrile. Possible cellulitis of RLE? 
- per patient, RLE discoloration has been there for a while with some tenderness due to a fall. 
- monitor for now. Low threshold to start abx 
 
T2DM - home: Levemir 40 units 2 times per day and Humalog sliding scales. - c/w Humalog sliding scales for now 
  
Trauma of the right knee   
- XR: no fracture; 
- Monitor for now - pain control 
- avoid morpine 
  
Debility 
- PT/Ot 
- need rehab placement Diet:  DIET CARDIAC 
DVT PPx: scds Code: Prior Medical Decision Making: 
 
Labs/Imaging reviewed. Additional information obtained from nursing staff. Patient is moderate risk due to medical condition and comorbidities. Plan discussed with nursing staff. Plan discussed with patient/family. All questions/concerns were addressed. Pt/family agrees with the plan. Signed By: Marina Leos MD   
 January 19, 2020

## 2020-01-19 NOTE — PROGRESS NOTES
H&P/Consult Note/Progress Note/Office Note:  
Polly Ambriz  MRN: [id number]  DBJ:[birth year]  Age:79 y.o. 
 
HPI: Polly Ambriz is a 70 y.o. male who on Eliquis for afib who came to the ER on 1/17/20 after a fall 1-2 hrs prior to arrival. 
He also takes Indocin He had swelling and hematoma of the right upper anterior chest 
He fell from a standing position while using a Rollator and landed on the device and hit is right anterior chest.  
He developed progressive severe swelling and hematoma He is on Eliquis for atrial fibrillation. Over the past 2 hours since the fall the hematoma has expanded to a small melon. Very painful and firm. No SOB. Denies any hip or back pain. Also has associated right knee pain No LOC or head trauma No blood loss. Point of impact: right chest wall. Pain location: right chest wall. The pain is moderate. Pertinent negatives include no visual change, no fever, no numbness, no abdominal pain, no nausea, no vomiting, no hematuria, no headaches The risk factors include being elderly. The symptoms are aggravated by pressure on injury. He has tried nothing for the symptoms. CXR and right knee xrays as below. Labs are pending 1/17/20 CXR, 1 views Hx: cough Comparison:  CXR 12/22/19 
  
There is mildly improved pulmonary vascular congestion since prior exam, however 
with continued central vascular congestion. Small basilar effusions are 
suggested particularly on the right. The cardiomediastinal silhouette is within 
normal limits. There are no acute osseous abnormalities. A left subclavian 
pacing device remains in place. 
  
Impression:  CHF/volume overload however improved over prior exam. 
  
 
  
1/17/20 right knee, 4 views No fracture or dislocation is identified. There is moderate to severe medial joint space loss. No evidence of joint effusion is demonstrated.   
There is incidental vascular calcification. 
  
 Impression: Moderate to severe medial joint space loss. 
  
 
1/18/20 some hypotension overnight; BNP>2100; cardiology consulted; Hgb stable; right chest wall hematoma much smaller; no new areas of pain after fall prior to admission 1/19/20 Cardiology OK with holding anticoagulation. Will likely nee PRBC today; defer to Hospitalists Past Medical History:  
Diagnosis Date  Arrhythmia Paroxysmal Atrial fibrillation  Arthritis   
 hip and knee  CAD (coronary artery disease)  Cardiac pacemaker 11/12/2015  Chronic diastolic heart failure ([address] 11/12/2015  Chronic pain   
 hips & knees  Diabetes ([address]  Diabetes mellitus type II, uncontrolled ([address] adult onset  Dyspnea/shortness of breath 11/12/2015  GERD (gastroesophageal reflux disease)  Heart failure ([address]  HTN - uncontrolled, malignant 8/8/2016  Hyperlipidemia 11/12/2015  Hypertension   
 malignant, uncontrolled  Malaise/fatigue/weakness/tiredness 11/12/2015  Mixed hyperlipidemia  Morbid obesity ([address]  Orthostatic hypotension 11/12/2015  Other ill-defined conditions(799.89) mixed hyperlipidemia  Paroxysmal atrial fibrillation (HCC)   
 spontaneously converted from Afib to sinus with sotalol, no eCV  Permanent atrial fibrillation ([address] 11/12/2015  Tendon injury R foot  Unspecified sleep apnea Past Surgical History:  
Procedure Laterality Date  HX HERNIA REPAIR    
 HX OTHER SURGICAL    
 hemorrhoidectomy  HX PACEMAKER    
 MI LEFT HEART CATH,PERCUTANEOUS  3/31/2014 BMS to mid LAD Current Facility-Administered Medications Medication Dose Route Frequency  famotidine (PF) (PEPCID) 20 mg in 0.9% sodium chloride 10 mL injection  20 mg IntraVENous Q12H  
 gabapentin (NEURONTIN) capsule 300 mg  300 mg Oral Q12H  
 HYDROcodone-acetaminophen (NORCO) 7.5-325 mg per tablet 1 Tab  1 Tab Oral Q6H PRN  
  insulin lispro (HUMALOG) injection   SubCUTAneous AC&HS  ondansetron (ZOFRAN) injection 4 mg  4 mg IntraVENous Q6H PRN  probenecid (BENEMID) tablet 500 mg  500 mg Oral Q12H  
 sotalol (BETAPACE) tablet 120 mg  120 mg Oral Q12H  
 0.9% sodium chloride infusion  75 mL/hr IntraVENous CONTINUOUS Clindamycin; Demerol [meperidine]; Losartan-hydrochlorothiazide; and Pcn [penicillins] Social History Socioeconomic History  Marital status:  Spouse name: Not on file  Number of children: Not on file  Years of education: Not on file  Highest education level: Not on file Tobacco Use  Smoking status: Former Smoker Years:  Last attempt to quit: 3/25/1970 Years since quittin.8  Smokeless tobacco: Never Used Substance and Sexual Activity  Alcohol use: No  
 Drug use: No  
 
Social History Tobacco Use Smoking Status Former Smoker  Years:   Last attempt to quit: 3/25/1970  Years since quittin.8 Smokeless Tobacco Never Used Family History Problem Relation Age of Onset  Diabetes Mother  Cancer Father  Diabetes Brother  Hypertension Brother ROS: The patient has no difficulty with chest pain or shortness of breath. No fever or chills. Comprehensive review of systems was otherwise unremarkable except as noted above. Physical Exam:  
Visit Vitals /64 Pulse (!) 102 Temp 98.4 °F (36.9 °C) Resp 22 Wt 335 lb 12.2 oz (152.3 kg) SpO2 98% BMI 41.97 kg/m² Vitals:  
 20 2051 20 0022 20 0319 20 7098 BP: 119/68 124/67 118/62 129/64 Pulse:  75 75 (!) 102 Resp:  23 20 22 Temp:  98.4 °F (36.9 °C) 98.5 °F (36.9 °C) 98.4 °F (36.9 °C) SpO2:   95% 98% Weight:      
 
No intake/output data recorded.  1901 -  0700 In: 1994.6 [I.V.:1104.2] Out: 1225 [Urine:1225] Constitutional: Alert, weak, cooperative patient in no acute distress; appears stated age Eyes:Sclera are clear. EOMs intact ENMT: no external lesions gross hearing normal; no obvious neck masses, no ear or lip lesions, nares normal 
CV: RRR. Normal perfusion Resp: No JVD. Breathing is  non-labored; no audible wheezing. Large hematoma of right anterior chest size of a azael on admission--->lots of echymosis by 1/19/20 (stable) No external bleeding GI: soft and non-distended Musculoskeletal: weak and deconditioned. No embolic signs or cyanosis. Right knee tender to palpation Neuro:  Oriented; moves all 4; no focal deficits Psychiatric: normal affect and mood, no memory impairment Recent vitals (if inpt): 
Patient Vitals for the past 24 hrs: 
 BP Temp Pulse Resp SpO2  
01/19/20 0656 129/64 98.4 °F (36.9 °C) (!) 102 22 98 % 01/19/20 0319 118/62 98.5 °F (36.9 °C) 75 20 95 % 01/19/20 0022 124/67 98.4 °F (36.9 °C) 75 23   
01/18/20 2051 119/68      
01/18/20 1947 119/68 98.4 °F (36.9 °C) 88 23 97 % 01/18/20 1609 120/70 98.5 °F (36.9 °C) 76 28 100 % 01/18/20 1314 102/50  83 (!) 34 95 % 01/18/20 1300 115/55  77 (!) 32 95 % 01/18/20 1249 116/56  79    
01/18/20 1245 116/56  79 17 96 % 01/18/20 1230 121/60  80 (!) 36 98 % 01/18/20 1215 121/58  75 23 99 % 01/18/20 1200 115/55  75 22 99 % 01/18/20 1145 113/54 98.1 °F (36.7 °C) 79 23 95 % 01/18/20 1129 116/59  80 (!) 59 94 % 01/18/20 1114 102/52  77 19 94 % 01/18/20 1109 105/51  81 29 95 % 01/18/20 1044 114/56  79 26 93 % 01/18/20 1014 108/56  75 15 97 % 01/18/20 1000 115/54  75 16 98 % 01/18/20 0945 110/55  75 20 95 % 01/18/20 0929 109/55  77 21 98 % 01/18/20 0914 106/55  79 20 97 % 01/18/20 0900 99/53  75 9 95 % 01/18/20 0845 102/55  78 24 99 % 01/18/20 0836 98/57  76    
01/18/20 0834 98/57  77    
01/18/20 0829 98/57  79 (!) 36 98 % 01/18/20 0814 99/55  80 (!) 31 97 % 01/18/20 0800 99/55  75 20 96 % 01/18/20 0745 97/52  81 20 98 % 01/18/20 0729 110/56  78 27 99 % 01/18/20 0714 112/55 98.4 °F (36.9 °C) 75 19 98 % Labs: 
Recent Labs  
  01/19/20 
0515  01/18/20 
0335 WBC 11.5*  --  17.0* HGB 7.5*   < > 7.8*   --  234   --  140  
K 4.9  --  4.7 *  --  110* CO2 25  --  25 BUN 23  --  21  
CREA 1.53*  --  1.36  
*  --  130* PTP  --   --  18.7* INR  --   --  1.5 APTT  --   --  33.1 TBILI 0.8  --  2.1*  
SGOT 26  --  14* ALT 12  --  9* AP 38*  --  38*  
 < > = values in this interval not displayed. Lab Results Component Value Date/Time WBC 11.5 (H) 01/19/2020 05:15 AM  
 HGB 7.5 (L) 01/19/2020 05:15 AM  
 PLATELET 151 66/80/0726 05:15 AM  
 Sodium 139 01/19/2020 05:15 AM  
 Potassium 4.9 01/19/2020 05:15 AM  
 Chloride 110 (H) 01/19/2020 05:15 AM  
 CO2 25 01/19/2020 05:15 AM  
 BUN 23 01/19/2020 05:15 AM  
 Creatinine 1.53 (H) 01/19/2020 05:15 AM  
 Glucose 120 (H) 01/19/2020 05:15 AM  
 INR 1.5 01/18/2020 03:35 AM  
 aPTT 33.1 01/18/2020 03:35 AM  
 Bilirubin, total 0.8 01/19/2020 05:15 AM  
 AST (SGOT) 26 01/19/2020 05:15 AM  
 ALT (SGPT) 12 01/19/2020 05:15 AM  
 Alk. phosphatase 38 (L) 01/19/2020 05:15 AM  
 Troponin-I, Qt. <0.02 (L) 12/21/2019 10:05 PM  
 
 
CT Results  (Last 48 hours) 01/17/20 1848  CT CHEST W CONT Final result Impression:  IMPRESSION:  
1. Large right chest/breast hematoma with findings raising suspicion for active  
bleeding. 2. Mild interstitial opacities suggesting underlying chronic change. 3. Left lower lobe nodule. If the patient is considered high-risk for  
malignancy, follow-up scanning in one year would be recommended. Otherwise no  
further follow-up is required. 4. Indeterminate low density upper pole left renal lesion. CT scanning using a  
renal mass protocol may be beneficial for further evaluation on a nonemergent  
basis. This could also assess the indeterminate right adrenal gland nodule. Narrative:  CT chest with contrast   
   
History: Status post fall today, enlarging hematoma along right chest wall. Avril Chopra Technique: Helically acquired images were obtained from the lung apices to the  
domes of the diaphragms reconstructed at 5 mm thickness after the uneventful  
administration of 80 mL of intravenous Optiray-350 in order to better evaluate  
the mediastinal structures. Coronal reformatted images were submitted. Radiation dose reduction techniques were used for this study:  Our CT scanners  
use one or all of the following: Automated exposure control, adjustment of the  
mA and/or kVp according to patient's size, iterative reconstruction. Comparison: None. Correlation is made to the chest x-ray performed earlier on  
the same day. Findings: There is no pleural or pericardial effusion. The heart and great  
vessels are unremarkable. There are no airspace opacities. There is mild  
peripheral interstitial prominence suggesting underlying interstitial lung  
disease. Within the left lower lobe on image 30 is a 5 mm nodule. Few additional  
calcified nodules are present. No adenopathy is present within the thorax. There is a large hematoma within the right breast measuring approximately 9.7 x  
13.4 cm. There are areas of central decreased attenuation which can be seen as  
a result of active bleeding. There is diffuse edema with smaller nearby masses,  
presumably representing additional sites of hematoma more superficially. No  
acute rib fracture is present. Imaging of the upper abdomen reveals a low-density structure within the upper  
pole right kidney measuring 2.1 cm, not confidently characterized as a simple  
cyst. There is a 1.7 cm right adrenal gland nodule, incompletely characterized. chest X-ray I reviewed recent labs, recent radiologic studies, and pertinent records including other doctor notes if needed.     I independently reviewed radiology images for studies I described above or studies I have ordered. Admission date (for inpatients): 1/17/2020 * No surgery found *  * No surgery found * ASSESSMENT/PLAN: 
Problem List  Date Reviewed: 7/15/2019 Codes Class Noted * (Principal) Fall ICD-10-CM: W19Tamia Patel ICD-9-CM: S623.5  1/17/2020 Hematoma of right chest wall ICD-10-CM: D72.091V ICD-9-CM: 922.1  1/17/2020 Coagulopathy ([facility] from Eliquis adn Indocin use ICD-10-CM: D68.9 ICD-9-CM: 286.9  1/17/2020 Acute pain of right knee ICD-10-CM: M25.561 ICD-9-CM: 719.46  1/17/2020 Debility ICD-10-CM: R53.81 ICD-9-CM: 799.3  1/17/2020 Fluid overload ICD-10-CM: E87.70 ICD-9-CM: 276.69  1/17/2020 [facility]) ICD-10-CM: I48.91 
ICD-9-CM: 427.31  1/17/2020 Hypotension ICD-10-CM: I95.9 ICD-9-CM: 458.9  1/17/2020 Anticoagulated ICD-10-CM: Z79.01 
ICD-9-CM: V58.61  1/17/2020 Chest wall hematoma ICD-10-CM: S20.219A 
ICD-9-CM: 922.1  1/17/2020 Blood loss anemia ICD-10-CM: D50.0 ICD-9-CM: 280.0  1/17/2020 Acute blood loss anemia ICD-10-CM: D62 
ICD-9-CM: 285.1  1/17/2020 Subcutaneous hematoma ICD-10-CM: T14. Edgard Fulton ICD-9-CM: 924.9  1/17/2020 Hypoglycemia ICD-10-CM: E16.2 ICD-9-CM: 251.2  12/22/2019 Syncope ICD-10-CM: R55 
ICD-9-CM: 780.2  12/22/2019 Morbid obesity ([facility] ICD-10-CM: E66.01 
ICD-9-CM: 278.01  Unknown Paroxysmal atrial fibrillation (HCC) ICD-10-CM: I48.0 ICD-9-CM: 427.31  Unknown Overview Signed 8/8/2016 11:37 AM by Rishi Gallo  
  spontaneously converted from Afib to sinus with sotalol, no eCV HTN - uncontrolled, malignant ICD-10-CM: I10 
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 
ICD-9-CM: 428.32  11/12/2015 Overview Signed 11/12/2015 10:24 AM by [facility] Echo 8/15:  EF 50-55%, mild MR Echo 3/14:  EF 50-55%, mild to mod MR and TR, RVSP 46 mmHg A/C DHF 3/2014 admission, before [facility] Malaise/fatigue/weakness/tiredness ICD-10-CM: C14.32 ICD-9-CM: 780.79  11/12/2015 Dyspnea/shortness of breath ICD-10-CM: R06.00 
ICD-9-CM: 786.09  11/12/2015 Post PTCA ICD-10-CM: C39.76 ICD-9-CM: V45.82  4/1/2014 Coronary atherosclerosis of native coronary artery ICD-10-CM: I25.10 ICD-9-CM: 414.01  4/1/2014 Overview Signed 11/12/2015 10:21 AM by [facility] 3/31/14: PCI of mid LAD with BMS, mild dz in other vessels Acute respiratory failure ([facility] ICD-10-CM: J96.00 
ICD-9-CM: 518.81  3/28/2014 DM (diabetes mellitus) ([facility] ICD-10-CM: E11.9 ICD-9-CM: 250.00  3/28/2014 Principal Problem: 
  Fall (1/17/2020) Active Problems: 
  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 
BNP>2100 initially In a floor bed now Cardiology OK with stopping anticoagulation He is off Eliquis INR is 1.5 for no apparent reason Serial Hgb Transfuse prn; defer to Hospitalists and cardiology PO cardiac diet Hypomagnesemia 
2gm Mag sulfate IVPB x 2 ordered today Coagulopathy secondary to Eliquis and NSAID use Hold Eliquis, NSAIS, ASA and all anticoagulants for now Debility/Fall Consult PT May need SNF Acute right knee pain after fall Xray with no fracture Pain meds prn I have personally performed a face-to-face diagnostic evaluation and management  service on this patient. I have independently seen the patient. I have independently obtained the above history from the patient/family. I have independently examined the patient with above findings. I have independently reviewed data/labs for this patient and developed the above plan of care (MDM). Signed: Pantera Salas.  Jazmin Bai MD, FACS

## 2020-01-19 NOTE — PROGRESS NOTES
[facility] OUTREACH NURSE PROGRESS REPORT SUBJECTIVE: Called to assess patient secondary to transfer from critical care. MEWS Score: 2 (01/18/20 1609) Vitals:  
 01/18/20 1300 01/18/20 1314 01/18/20 1609 01/18/20 2051 BP: 115/55 102/50 120/70 119/68 Pulse: 77 83 76 Resp: (!) 32 (!) 34 28 Temp:   98.5 °F (36.9 °C) SpO2: 95% 95% 100% Weight:      
  
 
LAB DATA: 
 
Recent Labs  
  01/18/20 
0335 01/17/20 
1601  138  
K 4.7 4.5  
* 107 CO2 25 29 AGAP 5* 2*  
* 77 BUN 21 18 CREA 1.36 1.34 GFRAA >60 >60 GFRNA 54* 55* CA 7.5* 8.3 MG 1.6*  --   
ALB 1.8* 2.1*  
TP 5.0* 5.8*  
GLOB 3.2 3.7* AGRAT 0.6* 0.6* ALT 9* 11* Recent Labs  
  01/18/20 
1445 01/18/20 
0913 01/18/20 
0335  01/17/20 
1601 WBC  --   --  17.0*  --  14.5* HGB 7.8* 7.8* 7.8*   < > 7.3* HCT 25.8* 25.7* 25.7*   < > 25.2*  
PLT  --   --  234  --  341  
 < > = values in this interval not displayed. OBJECTIVE: On arrival to room, I found patient to be in bed, resting quietly. Pain Assessment Pain Intensity 1: 0 (01/18/20 1145) Pain Location 1: Back Patient Stated Pain Goal: 0 
 
ASSESSMENT:  Pt awakens upon entrance to room. Oriented x4. Sp02 97% on 2L NC. R chest ecchymosis and hematoma with soft edges within drawn outline. Pt reports tenderness to the area and generalized aches that he states are chronic. Pt denies any additional discomfort. VS, labs, and progress notes reviewed. Primary RN without any additional needs/concerns at this time. PLAN:  Will continue to follow per outreach protocol.

## 2020-01-19 NOTE — PROGRESS NOTES
[facility] CARDIOLOGY PROGRESS NOTE 
 
1/19/2020 6:43 AM 
 
Admit Date: 1/17/2020 Admit Diagnosis: Chest wall hematoma [S20.219A]; Hypotension [I95.9]; A-fib ([address] [I48.91]; Anticoagulated [Z79.01]; Acute blood loss anemia [D62]; Subcutaneous hematoma [T14. Lindalou Lis Subjective:  
Stable overnight without angina, CHF, or palpitations. Vitals stable and controlled. No other complaints overnight. Tolerating meds well. Weak but no CHF or angina. BP stable. He thinks hematoma on right chest is getting smaller, remains anemic. Objective:  
  
Vitals:  
 01/18/20 1947 01/18/20 2051 01/19/20 0022 01/19/20 8251 BP: 119/68 119/68 124/67 118/62 Pulse: 88  75 75 Resp: 23  23 20 Temp: 98.4 °F (36.9 °C)  98.4 °F (36.9 °C) 98.5 °F (36.9 °C) SpO2: 97%   95% Weight:      
 
 
Physical Exam: 
Neck- supple, no JVD 
CV- regular rate and rhythm no MRG Lung- clear bilaterally anterolaterally Abd- soft, nontender, nondistended Ext- trace pitting RLE below knee edema Skin- warm and dry, large hematoma right chest extending laterally Data Review:  
Recent Labs  
  01/19/20 
0515 01/18/20 
2241  01/18/20 
0335  01/17/20 
1601   --   --  140  --  138  
K 4.9  --   --  4.7  --  4.5 MG  --   --   --  1.6*  --   --   
BUN 23  --   --  21  --  18  
CREA 1.53*  --   --  1.36  --  1.34 *  --   --  130*  --  77 WBC 11.5*  --   --  17.0*  --  14.5* HGB 7.5* 7.2*   < > 7.8*   < > 7.3* HCT 24.5* 23.9*   < > 25.7*   < > 25.2*  
  --   --  234  --  341 INR  --   --   --  1.5  --  1.5  
 < > = values in this interval not displayed. Assessment and Plan:  
 
Principal Problem: 
  Fall (1/17/2020)- s/p large hematoma and blood loss requiring transfusion. Would remain off eliquis and we will arrange to see Dr. Placido Schwartz back in office to consider watchman device.  
  
Active Problems: 
  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 
  
 Hematoma of right chest wall (1/17/2020)- see above- smaller subjectively today but remains anemic- monitor closely.  
  
  Coagulopathy (HCC) from Eliquis and Indocin use (1/17/2020)- holding both at present with fall, hematoma, anemia s/p RBC's.  
  
  A-fib ([address] (1/17/2020)- currently AV pacing. Will need to remain on sotalol  
  
  Hypotension (1/17/2020)- resolved; likely secondary from blood loss anemia from chest hematoma.  
  
  Anticoagulated (1/17/2020) 
  
  Chest wall hematoma (1/17/2020)   Blood loss anemia (1/17/2020) 
  Acute blood loss anemia (1/17/2020)- stop eliquis and NSAIDS - with prior falls, debility, persistent anemia, I would avoid resuming oral anticoagulation for now. Consider transfusion again to get Hgb>9. Recheck in AM.   
  
  Subcutaneous hematoma (1/17/2020)- ? Smaller today, follow clinically and serial H/H. See above 
  
 
ALBIN Issa MD 
[facility] Cardiology Pager [phone]

## 2020-01-19 NOTE — PROGRESS NOTES
Date of Outreach Update: 
Christian Francisco was seen and assessed. MEWS Score: 1 (01/19/20 0319) Vitals:  
 01/19/20 0022 01/19/20 0319 01/19/20 6605 01/19/20 1148 BP: 124/67 118/62 129/64 121/71 Pulse: 75 75 (!) 102 79 Resp: 23 20 22 22 Temp: 98.4 °F (36.9 °C) 98.5 °F (36.9 °C) 98.4 °F (36.9 °C) 98.2 °F (36.8 °C) SpO2:  95% 98% 93% Weight:      
  
 
 Pain Assessment Pain Intensity 1: 6 (01/19/20 0755) Pain Location 1: Back Pain Intervention(s) 1: Medication (see MAR) Patient Stated Pain Goal: 0 Previous Outreach assessment has been reviewed. There have been no significant clinical changes since the completion of the last dated Outreach assessment. Will continue to follow up per outreach protocol. Signed By:   Cora Lantigua   January 19, 2020 3:30 PM

## 2020-01-20 NOTE — PROGRESS NOTES
Hospitalist Note Admit Date:  2020  3:33 PM  
Name:  Madhu Berumen Age:  70 y.o. 
:  1940 MRN:  [id number] PCP:  Ga Bedoya MD 
Treatment Team: Attending Provider: Lida Price MD; Surgeon: Christiano Bradshaw MD; Consulting Provider: Shagufta Barr MD; Utilization Review: Corrinne Hue, RN; Staff Nurse: Brenda Gage RN; Physical Therapist: Guero Maher, PT, DPT; Care Manager: Gustavo Arvizu 
 
HPI/Subjective:  
Patient is seen and examined at bedside. No acute events overnight. Denies any chest discomfort, shortness of breath. No new distress. He feels weak especially in his leg and would like to go to rehab. Objective:  
 
Patient Vitals for the past 24 hrs: 
 Temp Pulse Resp BP SpO2  
20 1420 98.5 °F (36.9 °C) 75 18 136/69 93 % 20 1350 97.3 °F (36.3 °C) 80 20 149/74 91 % 20 1250 99 °F (37.2 °C) 80 20 152/75 95 % 20 1150 98.2 °F (36.8 °C) 76 20 147/77 91 % 20 1134 98.4 °F (36.9 °C) 78 20 143/75 93 % 20 1050 97.7 °F (36.5 °C) 80 20 159/80 95 % 20 1035 97.3 °F (36.3 °C) 79 20 147/74 95 % 20 0722 97.8 °F (36.6 °C) 89 20 160/73 95 % 20 0423 97.9 °F (36.6 °C) 74 20 127/77 95 % 20 2340 98.7 °F (37.1 °C) 83 22 133/67 95 % 20 1926 98.1 °F (36.7 °C) 82 22 136/78  Oxygen Therapy O2 Sat (%): 93 % (20 1420) Pulse via Oximetry: 84 beats per minute (20 1314) O2 Device: Room air (20 0835) O2 Flow Rate (L/min): 2 l/min (20 0714) Estimated body mass index is 41.97 kg/m² as calculated from the following: 
  Height as of 19: 6' 3\" (1.905 m). Weight as of this encounter: 152.3 kg (335 lb 12.2 oz). Intake/Output Summary (Last 24 hours) at 2020 1636 Last data filed at 2020 7694 Gross per 24 hour Intake 2363 ml Output 600 ml Net 1763 ml  
   
 *Note that automatically entered I/Os may not be accurate; dependent on patient compliance with collection and accurate  by techs. Physical Exam:  
General:     alert, awake, no acute distress. Well nourished, head hunched forward(states its normal) Head:   normocephalic, atraumatic Eyes, Ears, nose: PERRL, EOMI. Neck:    supple, non-tender Lungs:   CTAB, no wheezing, rhonchi, rales Cardiac:   RRR, Normal S1 and S2. Right chest wall hematoma, slightly tender to palpation. Abdomen:   Soft, non distended, nontender, +BS, no guarding/rebound Extremities:   Warm, dry. No edema. RLE larger than LLE(normal per pt) some chronic discoloration in the RLE, non tender to palpation, no warmth Skin:   No rashes, no jaundice, some chronic discoloration in the RLE Neuro:  Alert and oriented to person, time, place, situation. No gross focal neurological deficit Psychiatric:  No anxiety, calm, cooperative Data Review: 
I have reviewed all labs, meds, and studies from the last 24 hours: 
 
Recent Results (from the past 24 hour(s)) GLUCOSE, POC Collection Time: 01/19/20  5:03 PM  
Result Value Ref Range Glucose (POC) 160 (H) 65 - 100 mg/dL GLUCOSE, POC Collection Time: 01/19/20  8:57 PM  
Result Value Ref Range Glucose (POC) 166 (H) 65 - 100 mg/dL CBC W/O DIFF Collection Time: 01/20/20  5:25 AM  
Result Value Ref Range WBC 8.8 4.3 - 11.1 K/uL  
 RBC 2.93 (L) 4.23 - 5.6 M/uL HGB 7.0 (L) 13.6 - 17.2 g/dL HCT 22.8 (L) 41.1 - 50.3 % MCV 77.8 (L) 79.6 - 97.8 FL  
 MCH 23.9 (L) 26.1 - 32.9 PG  
 MCHC 30.7 (L) 31.4 - 35.0 g/dL  
 RDW 19.8 (H) 11.9 - 14.6 % PLATELET 989 587 - 562 K/uL MPV 10.9 9.4 - 12.3 FL ABSOLUTE NRBC 0.00 0.0 - 0.2 K/uL METABOLIC PANEL, COMPREHENSIVE Collection Time: 01/20/20  5:25 AM  
Result Value Ref Range Sodium 140 136 - 145 mmol/L Potassium 4.6 3.5 - 5.1 mmol/L  Chloride 110 (H) 98 - 107 mmol/L  
 CO2 23 21 - 32 mmol/L  
 Anion gap 7 7 - 16 mmol/L Glucose 127 (H) 65 - 100 mg/dL BUN 21 8 - 23 MG/DL Creatinine 1.34 0.8 - 1.5 MG/DL  
 GFR est AA >60 >60 ml/min/1.73m2 GFR est non-AA 55 (L) >60 ml/min/1.73m2 Calcium 8.1 (L) 8.3 - 10.4 MG/DL Bilirubin, total 0.6 0.2 - 1.1 MG/DL  
 ALT (SGPT) 13 12 - 65 U/L  
 AST (SGOT) 23 15 - 37 U/L Alk. phosphatase 41 (L) 50 - 136 U/L Protein, total 5.3 (L) 6.3 - 8.2 g/dL Albumin 1.9 (L) 3.2 - 4.6 g/dL Globulin 3.4 2.3 - 3.5 g/dL A-G Ratio 0.6 (L) 1.2 - 3.5 MAGNESIUM Collection Time: 01/20/20  5:25 AM  
Result Value Ref Range Magnesium 2.1 1.8 - 2.4 mg/dL GLUCOSE, POC Collection Time: 01/20/20  7:25 AM  
Result Value Ref Range Glucose (POC) 127 (H) 65 - 100 mg/dL GLUCOSE, POC Collection Time: 01/20/20 11:37 AM  
Result Value Ref Range Glucose (POC) 164 (H) 65 - 100 mg/dL GLUCOSE, POC Collection Time: 01/20/20  1:24 PM  
Result Value Ref Range Glucose (POC) 162 (H) 65 - 100 mg/dL All Micro Results None Current Meds: 
Current Facility-Administered Medications Medication Dose Route Frequency  0.9% sodium chloride infusion 250 mL  250 mL IntraVENous PRN  
 famotidine (PEPCID) tablet 20 mg  20 mg Oral BID  tuberculin injection 5 Units  5 Units IntraDERMal ONCE  
 gabapentin (NEURONTIN) capsule 300 mg  300 mg Oral Q12H  
 HYDROcodone-acetaminophen (NORCO) 7.5-325 mg per tablet 1 Tab  1 Tab Oral Q6H PRN  
 insulin lispro (HUMALOG) injection   SubCUTAneous AC&HS  ondansetron (ZOFRAN) injection 4 mg  4 mg IntraVENous Q6H PRN  probenecid (BENEMID) tablet 500 mg  500 mg Oral Q12H  
 sotalol (BETAPACE) tablet 120 mg  120 mg Oral Q12H  
 0.9% sodium chloride infusion  50 mL/hr IntraVENous CONTINUOUS Other Studies: 
 
Ct Chest W Cont Result Date: 1/17/2020 CT chest with contrast History: Status post fall today, enlarging hematoma along right chest wall. . Technique: Helically acquired images were obtained from the lung apices to the domes of the diaphragms reconstructed at 5 mm thickness after the uneventful administration of 80 mL of intravenous Optiray-350 in order to better evaluate the mediastinal structures. Coronal reformatted images were submitted. Radiation dose reduction techniques were used for this study:  Our CT scanners use one or all of the following: Automated exposure control, adjustment of the mA and/or kVp according to patient's size, iterative reconstruction. Comparison: None. Correlation is made to the chest x-ray performed earlier on the same day. Findings: There is no pleural or pericardial effusion. The heart and great vessels are unremarkable. There are no airspace opacities. There is mild peripheral interstitial prominence suggesting underlying interstitial lung disease. Within the left lower lobe on image 30 is a 5 mm nodule. Few additional calcified nodules are present. No adenopathy is present within the thorax. There is a large hematoma within the right breast measuring approximately 9.7 x 13.4 cm. There are areas of central decreased attenuation which can be seen as a result of active bleeding. There is diffuse edema with smaller nearby masses, presumably representing additional sites of hematoma more superficially. No acute rib fracture is present. Imaging of the upper abdomen reveals a low-density structure within the upper pole right kidney measuring 2.1 cm, not confidently characterized as a simple cyst. There is a 1.7 cm right adrenal gland nodule, incompletely characterized. IMPRESSION: 1. Large right chest/breast hematoma with findings raising suspicion for active bleeding. 2. Mild interstitial opacities suggesting underlying chronic change. 3. Left lower lobe nodule. If the patient is considered high-risk for malignancy, follow-up scanning in one year would be recommended. Otherwise no further follow-up is required.  4. Indeterminate low density upper pole left renal lesion. CT scanning using a renal mass protocol may be beneficial for further evaluation on a nonemergent basis. This could also assess the indeterminate right adrenal gland nodule. Xr Chest Letitia [place] Result Date: 1/17/2020 Clinical History: The patient is a 70years year old Male presenting with symptoms of cough Comparison:  Chest x-ray 12/22/2019 Findings:  Frontal view of the chest was obtained. There is mildly improved pulmonary vascular congestion since prior exam, however with continued central vascular congestion. Small basilar effusions are suggested particularly on the right. The cardiomediastinal silhouette is within normal limits. There are no acute osseous abnormalities. A left subclavian pacing device remains in place. Impression:  CHF/volume overload however improved over prior exam. CPT code(s) 29202 Xr Knee Rt 3 V Result Date: 1/17/2020 Clinical History: The patient is a 70years year old Male presenting with symptoms of pain. Comparison:  none Findings: 4 views of the right knee were obtained. No fracture or dislocation is identified. There is moderate to severe medial joint space loss. No evidence of joint effusion is demonstrated. There is incidental vascular calcification. Impression: Moderate to severe medial joint space loss. Assessment and Plan:  
 
Hospital Problems as of 1/20/2020 Date Reviewed: 7/15/2019 Codes Class Noted - Resolved POA * (Principal) Fall ICD-10-CM: W19. Fritzi Erica ICD-9-CM: E888.9  1/17/2020 - Present Yes Hematoma of right chest wall ICD-10-CM: I46.719L ICD-9-CM: 922.1  1/17/2020 - Present Yes Coagulopathy ([facility] [address] from Eliquis adn Indocin use ICD-10-CM: D68.9 ICD-9-CM: 286.9  1/17/2020 - Present Yes Acute pain of right knee ICD-10-CM: M25.561 ICD-9-CM: 719.46  1/17/2020 - Present Yes Debility ICD-10-CM: R53.81 ICD-9-CM: 799.3  1/17/2020 - Present Yes Fluid overload ICD-10-CM: E87.70 ICD-9-CM: 276.69  1/17/2020 - Present Yes A-fib [facility]) ICD-10-CM: I48.91 
ICD-9-CM: 427.31  1/17/2020 - Present Unknown Hypotension ICD-10-CM: I95.9 ICD-9-CM: 458.9  1/17/2020 - Present Unknown Anticoagulated ICD-10-CM: Z79.01 
ICD-9-CM: V58.61  1/17/2020 - Present Unknown Chest wall hematoma ICD-10-CM: S20.219A 
ICD-9-CM: 922.1  1/17/2020 - Present Unknown Blood loss anemia ICD-10-CM: D50.0 ICD-9-CM: 280.0  1/17/2020 - Present Unknown Acute blood loss anemia ICD-10-CM: D62 
ICD-9-CM: 285.1  1/17/2020 - Present Unknown Subcutaneous hematoma ICD-10-CM: T14. 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 
81-year-old male patient who has a past medical history of diabetes, atrial fibrillation on anticoagulation with Eliquis, obesity, status post pacemaker placement, chronic diastolic heart failure, frequent falls who present after loosing his balance while he was walking with Rollator walker. He fell on his right side  And hit the right side of his chest against a Rollator and he also suffered a minor trauma on the right knee. He was found to have hematoma the size of cantaloupe in the upper part of his right chest with Hb of 7.3, INR 1.5. Patient received morphine in the ED and became hypotensive. He was transferred to ICU due to hypotension despite IVF. He was also transfused 2units of PRBC. Surgery evaluated patient for possible hematoma evacuation. His hematoma has decreased in size by the morning, and BP has returned to normal. He was downgraded to medical floor for further management. Acute blood loss anemia secondary to large hematoma in the right upper chest s/p Fall - CT chest showed large chest wall hematoma 
- CT head wothout acute bleed 
- hold Eliquis , aspirin will be stopped. - s/p 2 units of PRBC  
- hb 7 this morning --> transfuse 1 unit 
- General surgery on board. Hypotension : resolved Atrial Fibrillation 
- on sotalol q12h  
- f/u cardio recs 
- holding eliquis - will need to see  in office for watchman procedure Leukocytosis: resolved T2DM 
- home: Levemir 40 units 2 times per day and Humalog sliding scales. - c/w Humalog sliding scales for now 
  
Trauma of the right knee   
- XR: no fracture; 
- Monitor for now - pain control 
- avoid morpine 
  
Debility 
- PT/Ot 
- need rehab placement Diet:  DIET CARDIAC 
DVT PPx: scds Code: Prior Medical Decision Making: 
 
Labs/Imaging reviewed. Additional information obtained from nursing staff. Patient is moderate risk due to medical condition and comorbidities. Plan discussed with nursing staff. Plan discussed with patient/family. All questions/concerns were addressed. Pt/family agrees with the plan. Signed By: Jaziel Cervantes MD   
 January 20, 2020

## 2020-01-20 NOTE — PROGRESS NOTES
Care Management Interventions PCP Verified by CM: Yes(Dr. Patrica Rodriguez) Mode of Transport at Discharge: Other (see comment)(TBD based on need) Transition of Care Consult (CM Consult): Discharge Planning Discharge Durable Medical Equipment: No 
Physical Therapy Consult: Yes Occupational Therapy Consult: No 
Speech Therapy Consult: No 
Current Support Network: Own Home, Lives with Spouse Confirm Follow Up Transport: Other (see comment)(TBD based on need) Discharge Location Discharge Placement: Unable to determine at this time This CM met with pt at bedside this day to complete assessment. Pt verified his PCP, insurance, emergency contact, and home address. He reports no difficulty obtaining his medications in the community. He lives at home with spouse with 1-2 steps to enter. His home DME includes a scooter and 2 rollators. He confirms that at baseline prior to admission he was completing his ADLs at a seated position. He was driving. Ideally pt would like to return home with spouse when stable for discharge. PT eval consult placed - will await their recommendations. No additional discharge needs for CM at this time. Will continue to follow.

## 2020-01-20 NOTE — PROGRESS NOTES
H&P/Consult Note/Progress Note/Office Note:  
Gus Pennington  MRN: [id number]  [facility]:[birth year]  Age:79 y.o. 
 
HPI: Gus Pennington is a 70 y.o. male who on Eliquis for afib who came to the ER on 1/17/20 after a fall 1-2 hrs prior to arrival. 
He also takes Indocin He had swelling and hematoma of the right upper anterior chest 
He fell from a standing position while using a Rollator and landed on the device and hit is right anterior chest.  
He developed progressive severe swelling and hematoma He is on Eliquis for atrial fibrillation. Over the past 2 hours since the fall the hematoma has expanded to a small melon. Very painful and firm. No SOB. Denies any hip or back pain. Also has associated right knee pain No LOC or head trauma No blood loss. Point of impact: right chest wall. Pain location: right chest wall. The pain is moderate. Pertinent negatives include no visual change, no fever, no numbness, no abdominal pain, no nausea, no vomiting, no hematuria, no headaches The risk factors include being elderly. The symptoms are aggravated by pressure on injury. He has tried nothing for the symptoms. CXR and right knee xrays as below. Labs are pending 1/17/20 CXR, 1 views Hx: cough Comparison:  CXR 12/22/19 
  
There is mildly improved pulmonary vascular congestion since prior exam, however 
with continued central vascular congestion. Small basilar effusions are 
suggested particularly on the right. The cardiomediastinal silhouette is within 
normal limits. There are no acute osseous abnormalities. A left subclavian 
pacing device remains in place. 
  
Impression:  CHF/volume overload however improved over prior exam. 
  
 
  
1/17/20 right knee, 4 views No fracture or dislocation is identified. There is moderate to severe medial joint space loss. No evidence of joint effusion is demonstrated.   
There is incidental vascular calcification. 
  
 Impression: Moderate to severe medial joint space loss. 
  
 
1/18/20 some hypotension overnight; BNP>2100; cardiology consulted; Hgb stable; right chest wall hematoma much smaller; no new areas of pain after fall prior to admission 1/19/20 Cardiology OK with holding anticoagulation. Will likely nee PRBC today; defer to Hospitalists 1/20/20 Hgb 7.0 this am and Creatinine higher; transfuse 1 unit PRBC this am 
 
 
 
  
 
 
Past Medical History:  
Diagnosis Date  Arrhythmia Paroxysmal Atrial fibrillation  Arthritis   
 hip and knee  CAD (coronary artery disease)  Cardiac pacemaker 11/12/2015  Chronic diastolic heart failure ([address] 11/12/2015  Chronic pain   
 hips & knees  Diabetes ([address]  Diabetes mellitus type II, uncontrolled ([address] adult onset  Dyspnea/shortness of breath 11/12/2015  GERD (gastroesophageal reflux disease)  Heart failure ([address]  HTN - uncontrolled, malignant 8/8/2016  Hyperlipidemia 11/12/2015  Hypertension   
 malignant, uncontrolled  Malaise/fatigue/weakness/tiredness 11/12/2015  Mixed hyperlipidemia  Morbid obesity ([address]  Orthostatic hypotension 11/12/2015  Other ill-defined conditions(799.89) mixed hyperlipidemia  Paroxysmal atrial fibrillation (HCC)   
 spontaneously converted from Afib to sinus with sotalol, no eCV  Permanent atrial fibrillation ([address] 11/12/2015  Tendon injury R foot  Unspecified sleep apnea Past Surgical History:  
Procedure Laterality Date  HX HERNIA REPAIR    
 HX OTHER SURGICAL    
 hemorrhoidectomy  HX PACEMAKER    
 OK LEFT HEART CATH,PERCUTANEOUS  3/31/2014 BMS to mid LAD Current Facility-Administered Medications Medication Dose Route Frequency  0.9% sodium chloride infusion 250 mL  250 mL IntraVENous PRN  
 famotidine (PF) (PEPCID) 20 mg in 0.9% sodium chloride 10 mL injection  20 mg IntraVENous Q12H  gabapentin (NEURONTIN) capsule 300 mg  300 mg Oral Q12H  
 HYDROcodone-acetaminophen (NORCO) 7.5-325 mg per tablet 1 Tab  1 Tab Oral Q6H PRN  
 insulin lispro (HUMALOG) injection   SubCUTAneous AC&HS  ondansetron (ZOFRAN) injection 4 mg  4 mg IntraVENous Q6H PRN  probenecid (BENEMID) tablet 500 mg  500 mg Oral Q12H  
 sotalol (BETAPACE) tablet 120 mg  120 mg Oral Q12H  
 0.9% sodium chloride infusion  75 mL/hr IntraVENous CONTINUOUS Clindamycin; Demerol [meperidine]; Losartan-hydrochlorothiazide; and Pcn [penicillins] Social History Socioeconomic History  Marital status:  Spouse name: Not on file  Number of children: Not on file  Years of education: Not on file  Highest education level: Not on file Tobacco Use  Smoking status: Former Smoker Years:  Last attempt to quit: 3/25/1970 Years since quittin.8  Smokeless tobacco: Never Used Substance and Sexual Activity  Alcohol use: No  
 Drug use: No  
 
Social History Tobacco Use Smoking Status Former Smoker  Years:   Last attempt to quit: 3/25/1970  Years since quittin.8 Smokeless Tobacco Never Used Family History Problem Relation Age of Onset  Diabetes Mother  Cancer Father  Diabetes Brother  Hypertension Brother ROS: The patient has no difficulty with chest pain or shortness of breath. No fever or chills. Comprehensive review of systems was otherwise unremarkable except as noted above. Physical Exam:  
Visit Vitals /77 (BP 1 Location: Right arm, BP Patient Position: At rest) Pulse 74 Temp 97.9 °F (36.6 °C) Resp 20 Wt 335 lb 12.2 oz (152.3 kg) SpO2 95% BMI 41.97 kg/m² Vitals:  
 20 1555 20 1926 20 2340 20 0423 BP: 134/81 136/78 133/67 127/77 Pulse: 80 82 83 74 Resp: 22 22 22 20 Temp: 97.9 °F (36.6 °C) 98.1 °F (36.7 °C) 98.7 °F (37.1 °C) 97.9 °F (36.6 °C) SpO2: 90%  95% 95% Weight:      
 
01/19 1901 - 01/20 0700 In: 2363 [I.V.:2363] Out: 250 [Urine:250] 01/18 0701 - 01/19 1900 In: -  
Out: 162 [place] Constitutional: Alert, weak, cooperative patient in no acute distress; appears stated age Eyes:Sclera are clear. EOMs intact ENMT: no external lesions gross hearing normal; no obvious neck masses, no ear or lip lesions, nares normal 
CV: RRR. Normal perfusion Resp: No JVD. Breathing is  non-labored; no audible wheezing. Large hematoma of right anterior chest size of a azeal on admission--->lots of echymosis by 1/19/20 and 1/20/20(stable) No external bleeding GI: soft and non-distended Musculoskeletal: weak and deconditioned. No embolic signs or cyanosis. Right knee tender to palpation Neuro:  Oriented; moves all 4; no focal deficits Psychiatric: normal affect and mood, no memory impairment Recent vitals (if inpt): 
Patient Vitals for the past 24 hrs: 
 BP Temp Pulse Resp SpO2  
01/20/20 0423 127/77 97.9 °F (36.6 °C) 74 20 95 % 01/19/20 2340 133/67 98.7 °F (37.1 °C) 83 22 95 % 01/19/20 1926 136/78 98.1 °F (36.7 °C) 82 22   
01/19/20 1555 134/81 97.9 °F (36.6 °C) 80 22 90 % 01/19/20 1148 121/71 98.2 °F (36.8 °C) 79 22 93 % 01/19/20 0656 129/64 98.4 °F (36.9 °C) (!) 102 22 98 % Labs: 
Recent Labs  
  01/20/20 
0525  01/18/20 
0335 WBC 8.8   < > 17.0* HGB 7.0*   < > 7.8*    < > 234    < > 140  
K 4.6   < > 4.7 *   < > 110* CO2 23   < > 25 BUN 21   < > 21  
CREA 1.34   < > 1.36  
*   < > 130* PTP  --   --  18.7* INR  --   --  1.5 APTT  --   --  33.1 TBILI PENDING   < > 2.1*  
SGOT 23   < > 14* ALT PENDING   < > 9* AP PENDING   < > 38*  
 < > = values in this interval not displayed. Lab Results Component Value Date/Time  WBC 8.8 01/20/2020 05:25 AM  
 HGB 7.0 (L) 01/20/2020 05:25 AM  
 PLATELET 267 86/46/9093 05:25 AM  
 Sodium 140 01/20/2020 05:25 AM  
 Potassium 4.6 01/20/2020 05:25 AM  
 Chloride 110 (H) 01/20/2020 05:25 AM  
 CO2 23 01/20/2020 05:25 AM  
 BUN 21 01/20/2020 05:25 AM  
 Creatinine 1.34 01/20/2020 05:25 AM  
 Glucose 127 (H) 01/20/2020 05:25 AM  
 INR 1.5 01/18/2020 03:35 AM  
 aPTT 33.1 01/18/2020 03:35 AM  
 Bilirubin, total PENDING 01/20/2020 05:25 AM  
 AST (SGOT) 23 01/20/2020 05:25 AM  
 ALT (SGPT) PENDING 01/20/2020 05:25 AM  
 Alk. phosphatase PENDING 01/20/2020 05:25 AM  
 Troponin-I, Qt. <0.02 (L) 12/21/2019 10:05 PM  
 
 
CT Results  (Last 48 hours) None  
  
 
chest X-ray I reviewed recent labs, recent radiologic studies, and pertinent records including other doctor notes if needed. I independently reviewed radiology images for studies I described above or studies I have ordered. Admission date (for inpatients): 1/17/2020 * No surgery found *  * No surgery found * ASSESSMENT/PLAN: 
Problem List  Date Reviewed: 7/15/2019 Codes Class Noted * (Principal) Fall ICD-10-CM: W19. Diana Matamoros ICD-9-CM: O722.8  1/17/2020 Hematoma of right chest wall ICD-10-CM: J76.301M ICD-9-CM: 922.1  1/17/2020 Coagulopathy ([facility] from Eliquis adn Indocin use ICD-10-CM: D68.9 ICD-9-CM: 286.9  1/17/2020 Acute pain of right knee ICD-10-CM: M25.561 ICD-9-CM: 719.46  1/17/2020 Debility ICD-10-CM: R53.81 ICD-9-CM: 799.3  1/17/2020 Fluid overload ICD-10-CM: E87.70 ICD-9-CM: 276.69  1/17/2020 [facility]) ICD-10-CM: I48.91 
ICD-9-CM: 427.31  1/17/2020 Hypotension ICD-10-CM: I95.9 ICD-9-CM: 458.9  1/17/2020 Anticoagulated ICD-10-CM: Z79.01 
ICD-9-CM: V58.61  1/17/2020 Chest wall hematoma ICD-10-CM: S20.219A 
ICD-9-CM: 922.1  1/17/2020 Blood loss anemia ICD-10-CM: D50.0 ICD-9-CM: 280.0  1/17/2020 Acute blood loss anemia ICD-10-CM: D62 
ICD-9-CM: 285.1  1/17/2020 Subcutaneous hematoma ICD-10-CM: T14. Daniel Eric ICD-9-CM: 924.9  1/17/2020 Hypoglycemia ICD-10-CM: E16.2 ICD-9-CM: 251.2  12/22/2019 Syncope ICD-10-CM: R55 
ICD-9-CM: 780.2  12/22/2019 Morbid obesity ([facility] ICD-10-CM: E66.01 
ICD-9-CM: 278.01  Unknown Paroxysmal atrial fibrillation (HCC) ICD-10-CM: I48.0 ICD-9-CM: 427.31  Unknown Overview Signed 8/8/2016 11:37 AM by Aracely Solis  
  spontaneously converted from Afib to sinus with sotalol, no eCV HTN - uncontrolled, malignant ICD-10-CM: I10 
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 
ICD-9-CM: 428.32  11/12/2015 Overview Signed 11/12/2015 10:24 AM by Lilian Rodriguez Echo 8/15:  EF 50-55%, mild MR Echo 3/14:  EF 50-55%, mild to mod MR and TR, RVSP 46 mmHg A/C DHF 3/2014 admission, before [address] [facility] Malaise/fatigue/weakness/tiredness ICD-10-CM: Y83.91 ICD-9-CM: 780.79  11/12/2015 Dyspnea/shortness of breath ICD-10-CM: R06.00 
ICD-9-CM: 786.09  11/12/2015 Post PTCA ICD-10-CM: Z96.42 ICD-9-CM: V45.82  4/1/2014 Coronary atherosclerosis of native coronary artery ICD-10-CM: I25.10 ICD-9-CM: 414.01  4/1/2014 Overview Signed 11/12/2015 10:21 AM by Lilian Rodriguez [facility] 3/31/14: PCI of mid LAD with BMS, mild dz in other vessels Acute respiratory failure ([facility] ICD-10-CM: J96.00 
ICD-9-CM: 518.81  3/28/2014 DM (diabetes mellitus) ([facility] ICD-10-CM: E11.9 ICD-9-CM: 250.00  3/28/2014 Principal Problem: 
  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 Hematoma of right chest wall (1/17/2020) Coagulopathy ([address] from Eliquis adn Indocin use (1/17/2020) Acute pain of right knee (1/17/2020) Debility (1/17/2020) Fluid overload (1/17/2020) A-fib ([address] (1/17/2020) Hypotension (1/17/2020) Anticoagulated (1/17/2020) Chest wall hematoma (1/17/2020) Blood loss anemia (1/17/2020) Acute blood loss anemia (1/17/2020) Subcutaneous hematoma (1/17/2020) Fall on anticoagulants (Eliquis and Indocin) with large right chest wall hematoma Debilitated patient with mult comorbidities In CCU initially; now in floor bed BNP>2100 initially Cardiology OK with stopping anticoagulation He is off Eliquis INR is 1.5 for no apparent reason-->Vit K 5mg ordered Hgb this am 7.0 and Cr higher at 1.53 yesterday (am lytes pending) -->I ordered 1 unit PRBC this am 
Transfuse prn 
 
PO cardiac diet Hypomagnesemia 
2gm Mag sulfate IVPB x 2 ordered today Coagulopathy secondary to Eliquis and NSAID use Hold Eliquis, NSAIS, ASA and all anticoagulants for now Debility/Fall Consult PT May need SNF Acute right knee pain after fall Xray with no fracture Pain meds prn I have personally performed a face-to-face diagnostic evaluation and management  service on this patient. I have independently seen the patient. I have independently obtained the above history from the patient/family. I have independently examined the patient with above findings. I have independently reviewed data/labs for this patient and developed the above plan of care (MDM). Signed: Amelia Erickson.  Dakota Hart MD, FACS

## 2020-01-20 NOTE — PROGRESS NOTES
[facility] CARDIOLOGY PROGRESS NOTE 
      
 
1/20/2020 5:33 PM 
 
Admit Date: 1/17/2020 Subjective: No cp or sob. Weak but would like to get up w/ PT. 
 
ROS: 
Cardiovascular:  As noted above Objective:  
  
Vitals:  
 01/20/20 1250 01/20/20 1350 01/20/20 1420 01/20/20 1604 BP: 152/75 149/74 136/69 137/70 Pulse: 80 80 75 80 Resp: 20 20 18 18 Temp: 99 °F (37.2 °C) 97.3 °F (36.3 °C) 98.5 °F (36.9 °C) 97.9 °F (36.6 °C) SpO2: 95% 91% 93% 91% Weight:      
 
 
Physical Exam: 
General-No Acute Distress, + hematoma right chest. 
 
Data Review:  
Recent Labs  
  01/20/20 
0525 01/19/20 
1507 01/19/20 
0515  01/18/20 
0335   --  139  --  140  
K 4.6  --  4.9  --  4.7 MG 2.1  --   --   --  1.6*  
BUN 21  --  23  --  21  
CREA 1.34  --  1.53*  --  1.36  
*  --  120*  --  130* WBC 8.8  --  11.5*  --  17.0* HGB 7.0* 7.2* 7.5*   < > 7.8* HCT 22.8* 24.0* 24.5*   < > 25.7*  
  --  255  --  234 INR  --   --   --   --  1.5  
 < > = values in this interval not displayed. Assessment/Plan:  
 
Principal Problem: 
  Fall (1/17/2020) Active Problems: 
  Cardiac pacemaker (11/12/2015) Hematoma of right chest wall (1/17/2020) Coagulopathy ([address] from Eliquis adn Indocin use (1/17/2020) Acute pain of right knee (1/17/2020) Debility (1/17/2020) Fluid overload (1/17/2020) A-fib ([address] (1/17/2020) Hypotension (1/17/2020) Anticoagulated (1/17/2020) Chest wall hematoma (1/17/2020) Blood loss anemia (1/17/2020) Acute blood loss anemia (1/17/2020) Subcutaneous hematoma (1/17/2020) 
   
//// 
 
C/V stable 
Cont. Sotalol No resumption of oral anticoagulation until he returns to the office to f/u w/ Dr. Cat Barnett after discharge. Thanks.   
 
 
 
 
Pepito Bullard MD 
1/20/2020 5:33 PM

## 2020-01-20 NOTE — PROGRESS NOTES
[facility] OUTREACH NURSE PROGRESS REPORT SUBJECTIVE: Called to assess patient secondary to transfer from critical care. MEWS Score: 2 (01/19/20 1926) Vitals:  
 01/19/20 0656 01/19/20 1148 01/19/20 1555 01/19/20 1926 BP: 129/64 121/71 134/81 136/78 Pulse: (!) 102 79 80 82 Resp: 22 22 22 22 Temp: 98.4 °F (36.9 °C) 98.2 °F (36.8 °C) 97.9 °F (36.6 °C) 98.1 °F (36.7 °C) SpO2: 98% 93% 90% Weight:      
  
 
 
LAB DATA: 
 
Recent Labs  
  01/19/20 
0515 01/18/20 
0335 01/17/20 
1601  140 138  
K 4.9 4.7 4.5  
* 110* 107 CO2 25 25 29 AGAP 4* 5* 2*  
* 130* 77 BUN 23 21 18 CREA 1.53* 1.36 1.34 GFRAA 57* >60 >60 GFRNA 47* 54* 55*  
CA 8.1* 7.5* 8.3 MG  --  1.6*  --   
ALB 2.1* 1.8* 2.1*  
TP 5.4* 5.0* 5.8*  
GLOB 3.3 3.2 3.7* AGRAT 0.6* 0.6* 0.6* ALT 12 9* 11* Recent Labs  
  01/19/20 
1507 01/19/20 
0515 01/18/20 
2241  01/18/20 
0335  01/17/20 
1601 WBC  --  11.5*  --   --  17.0*  --  14.5* HGB 7.2* 7.5* 7.2*   < > 7.8*   < > 7.3* HCT 24.0* 24.5* 23.9*   < > 25.7*   < > 25.2*  
PLT  --  255  --   --  234  --  341  
 < > = values in this interval not displayed. OBJECTIVE: On arrival to room, I found patient to be in bed, watching television. Pain Assessment Pain Intensity 1: 6 (01/19/20 1818) Pain Location 1: Back Pain Intervention(s) 1: Medication (see MAR) Patient Stated Pain Goal: 0 
 
ASSESSMENT:  Pt alert and oriented x4. SpO2 95% on room air. HR 82. R chest ecchymosis and hematoma with soft edges within drawn outline. Pt c/o slight back pain but denies any additional discomfort. VS, labs, and progress notes reviewed. PLAN:  Will continue to follow per outreach protocol.

## 2020-01-20 NOTE — PROGRESS NOTES
PT Note:  PT orders received and chart review initiated. Attempted evaluation again, RN states patient receiving blood. Will hold this am and attempt another time/day as schedule permits.   A Christi Kay, PT, DPT

## 2020-01-20 NOTE — PROGRESS NOTES
END OF SHIFT NOTE: 
 
INTAKE/OUTPUT 
01/19 0701 - 01/20 0700 In: 2363 [I.V.:2363] Out: 250 [Urine:250] Voiding: YES Catheter: NO 
Drain:   
 
 
 
 
 
Flatus: Patient does have flatus present. Stool:  0 occurrences. Characteristics: 
  
 
Emesis: 0 occurrences. Characteristics: VITAL SIGNS Patient Vitals for the past 12 hrs: 
 Temp Pulse Resp BP SpO2  
01/20/20 0423 97.9 °F (36.6 °C) 74 20 127/77 95 % 01/19/20 2340 98.7 °F (37.1 °C) 83 22 133/67 95 % 01/19/20 1926 98.1 °F (36.7 °C) 82 22 136/78  Pain Assessment Pain Intensity 1: 6 (01/19/20 1818) Pain Location 1: Back Pain Intervention(s) 1: Medication (see MAR) Patient Stated Pain Goal: 0 Ambulating No 
 
Shift report given to oncoming nurse at the bedside.  
 
Randy Norwood RN

## 2020-01-20 NOTE — PROGRESS NOTES
Critical Care Outreach Nurse Progress Report: 
 
Subjective: In to assess pt secondary to recent transfer from unit. MEWS Score: 1 (01/20/20 1050) Vitals:  
 01/20/20 1035 01/20/20 1050 01/20/20 1134 01/20/20 1150 BP: 147/74 159/80 143/75 147/77 Pulse: 79 80 78 76 Resp: 20 20 20 20 Temp: 97.3 °F (36.3 °C) 97.7 °F (36.5 °C) 98.4 °F (36.9 °C) 98.2 °F (36.8 °C) SpO2: 95% 95% 93% 91% Weight:      
  
 
Objective: Patient lying in bed watching tv. Pain Intensity 1: 6 (01/20/20 0835) Pain Location 1: Knee Pain Intervention(s) 1: Medication (see MAR) Patient Stated Pain Goal: 0 Assessment: Patient alert and oriented. Receiving one unit PRBC now. Hgb 7.0 this morning. Patient still feeling weak and complaining of knee pain at times from fall. Patient is hoping to work with therapy soon to start moving around. Plan: Will follow per outreach protocol.

## 2020-01-21 NOTE — PROGRESS NOTES
PT note: PT evaluation received and chart reviewed. Pt on hold this am, increased O2 needs at 10 L presently. Pt with BIPAP last night and this am. RN request to hold presently. Will attempt evaluation at later time and date as schedule allows. Thank you. Sudheer Duran, PT 
1/21/2020

## 2020-01-21 NOTE — PROGRESS NOTES
Patient complaining of SOB. O2 currently on 2 liters increased to 3 liters. Md notified on placing tele and adding neb treatments. Awaiting response.

## 2020-01-21 NOTE — PROGRESS NOTES
Patient placed on HFNC per Dr. Houston Petersen RN to assess patient shortly. Patient is alert and oriented and in no current distress. Will continue to monitor. 01/21/20 6327 Oxygen Therapy O2 Sat (%) 96 % Pulse via Oximetry 74 beats per minute O2 Device Hi flow nasal cannula O2 Flow Rate (L/min) 10 l/min

## 2020-01-21 NOTE — PROGRESS NOTES
Hospitalist Note Admit Date:  2020  3:33 PM  
Name:  Chinedu Gonsales Age:  70 y.o. 
:  1940 MRN:  [id number] PCP:  Cale Ramirez MD 
Treatment Team: Attending Provider: Drue Romberg, MD; Surgeon: Lara Ornelas MD; Utilization Review: Rosemary Tamayo RN; Care Manager: Nils Gongora; Staff Nurse: Eun Patel RN 
 
HPI/Subjective:  
 
 
Mr. Estelita Schmitz is a 71 yo male with PMH of DM2, AFIB on eliquis, obesity, HFpEF, falls admitted s/p fall with resultant right chest wall hematoma with associated anemia and coagulopathy. He required PRBC. Surgery consulted and no intervention needed. He has developed pulmonary edema requiring IV lasix. Cardiology has evaluated. Discharge plans pending 20 eating ok, having BM, some pain, some dyspnea, some edema, has chronic edema Objective:  
 
Patient Vitals for the past 24 hrs: 
 Temp Pulse Resp BP SpO2  
20 1542 98.2 °F (36.8 °C) 77 20 151/83 94 % 20 1431     93 % 20 1122 98.3 °F (36.8 °C) 81 20 148/53 99 % 20 0712 98.1 °F (36.7 °C) 79 20 152/78 96 % 20 0537     96 % 20 0335     98 % 20 0332 98.8 °F (37.1 °C) 76 22 132/67 100 % 20 0015 98.5 °F (36.9 °C) 78 18 157/88 93 % 20 2352     95 % 20 2039 98.3 °F (36.8 °C) 80 18 164/81 91 % Oxygen Therapy O2 Sat (%): 94 % (20 1542) Pulse via Oximetry: 68 beats per minute (20) O2 Device: Nasal cannula (20) O2 Flow Rate (L/min): 6 l/min(weaned from 10L) (20) FIO2 (%): 60 % (20) Estimated body mass index is 41.97 kg/m² as calculated from the following: 
  Height as of 19: 6' 3\" (1.905 m). Weight as of this encounter: 152.3 kg (335 lb 12.2 oz). Intake/Output Summary (Last 24 hours) at 2020 6352 Last data filed at 2020 1310 Gross per 24 hour Intake 1090 ml Output 1800 ml Net -710 ml  
   
*Note that automatically entered I/Os may not be accurate; dependent on patient compliance with collection and accurate  by techs. General:    Well nourished. Alert. No distress, obese CV:   RRR. No murmur, rub, or gallop. Trace edema Lungs:   CTAB. No wheezing, rhonchi, or rales. anterior Abdomen:   Soft, nontender, nondistended. obese Extremities: Warm and dry Skin:     No rashes or jaundice. Neuro:  No gross focal deficits Data Review: 
I have reviewed all labs, meds, and studies from the last 24 hours: 
 
Recent Results (from the past 24 hour(s)) GLUCOSE, POC Collection Time: 01/20/20  9:02 PM  
Result Value Ref Range Glucose (POC) 186 (H) 65 - 100 mg/dL POC G3 Collection Time: 01/21/20  3:18 AM  
Result Value Ref Range Device: NASAL CANNULA pH (POC) 7.256 (L) 7.35 - 7.45    
 pCO2 (POC) 48.7 (H) 35 - 45 MMHG  
 pO2 (POC) 50 (L) 75 - 100 MMHG  
 HCO3 (POC) 21.7 (L) 22 - 26 MMOL/L  
 sO2 (POC) 79 (L) 95 - 98 % Base deficit (POC) 5 mmol/L Allens test (POC) YES Site RIGHT RADIAL Specimen type (POC) ARTERIAL Performed by Darron   
 CO2, POC 23 MMOL/L Flow rate (POC) 3.000 L/min Respiratory comment: NurseNotified COLLECT TIME 314 CBC W/O DIFF Collection Time: 01/21/20  6:27 AM  
Result Value Ref Range WBC 15.9 (H) 4.3 - 11.1 K/uL  
 RBC 3.66 (L) 4.23 - 5.6 M/uL HGB 8.9 (L) 13.6 - 17.2 g/dL HCT 29.0 (L) 41.1 - 50.3 % MCV 79.2 (L) 79.6 - 97.8 FL  
 MCH 24.3 (L) 26.1 - 32.9 PG  
 MCHC 30.7 (L) 31.4 - 35.0 g/dL RDW 20.6 (H) 11.9 - 14.6 % PLATELET 368 762 - 992 K/uL MPV 10.9 9.4 - 12.3 FL ABSOLUTE NRBC 0.00 0.0 - 0.2 K/uL METABOLIC PANEL, COMPREHENSIVE Collection Time: 01/21/20  6:27 AM  
Result Value Ref Range Sodium 139 136 - 145 mmol/L Potassium 4.9 3.5 - 5.1 mmol/L Chloride 108 (H) 98 - 107 mmol/L  
 CO2 24 21 - 32 mmol/L  Anion gap 7 7 - 16 mmol/L  
 Glucose 177 (H) 65 - 100 mg/dL BUN 18 8 - 23 MG/DL Creatinine 1.37 0.8 - 1.5 MG/DL  
 GFR est AA >60 >60 ml/min/1.73m2 GFR est non-AA 54 (L) >60 ml/min/1.73m2 Calcium 8.2 (L) 8.3 - 10.4 MG/DL Bilirubin, total 1.0 0.2 - 1.1 MG/DL  
 ALT (SGPT) 12 12 - 65 U/L  
 AST (SGOT) 21 15 - 37 U/L Alk. phosphatase 40 (L) 50 - 136 U/L Protein, total 5.9 (L) 6.3 - 8.2 g/dL Albumin 2.1 (L) 3.2 - 4.6 g/dL Globulin 3.8 (H) 2.3 - 3.5 g/dL A-G Ratio 0.6 (L) 1.2 - 3.5 PROTHROMBIN TIME + INR Collection Time: 01/21/20  6:27 AM  
Result Value Ref Range Prothrombin time 14.3 11.7 - 14.5 sec INR 1.1 TYPE + CROSSMATCH Collection Time: 01/21/20  6:27 AM  
Result Value Ref Range Crossmatch Expiration 01/24/2020 ABO/Rh(D) B POSITIVE Antibody screen NEG Unit number [id number] Blood component type  LR Unit division 00 Status of unit ALLOCATED Crossmatch result Compatible GLUCOSE, POC Collection Time: 01/21/20  7:56 AM  
Result Value Ref Range Glucose (POC) 170 (H) 65 - 100 mg/dL GLUCOSE, POC Collection Time: 01/21/20  8:48 AM  
Result Value Ref Range Glucose (POC) 159 (H) 65 - 100 mg/dL GLUCOSE, POC Collection Time: 01/21/20 10:45 AM  
Result Value Ref Range Glucose (POC) 134 (H) 65 - 100 mg/dL GLUCOSE, POC Collection Time: 01/21/20 11:27 AM  
Result Value Ref Range Glucose (POC) 142 (H) 65 - 100 mg/dL GLUCOSE, POC Collection Time: 01/21/20  4:35 PM  
Result Value Ref Range Glucose (POC) 152 (H) 65 - 100 mg/dL All Micro Results None Current Meds: 
Current Facility-Administered Medications Medication Dose Route Frequency  LORazepam (ATIVAN) tablet 1 mg  1 mg Oral Q6H PRN  
 0.9% sodium chloride infusion 250 mL  250 mL IntraVENous PRN  
 furosemide (LASIX) injection 40 mg  40 mg IntraVENous DAILY  0.9% sodium chloride infusion 250 mL  250 mL IntraVENous PRN  
  famotidine (PEPCID) tablet 20 mg  20 mg Oral BID  tuberculin injection 5 Units  5 Units IntraDERMal ONCE  
 albuterol-ipratropium (DUO-NEB) 2.5 MG-0.5 MG/3 ML  3 mL Nebulization Q4H PRN  
 gabapentin (NEURONTIN) capsule 300 mg  300 mg Oral Q12H  
 HYDROcodone-acetaminophen (NORCO) 7.5-325 mg per tablet 1 Tab  1 Tab Oral Q6H PRN  
 insulin lispro (HUMALOG) injection   SubCUTAneous AC&HS  ondansetron (ZOFRAN) injection 4 mg  4 mg IntraVENous Q6H PRN  probenecid (BENEMID) tablet 500 mg  500 mg Oral Q12H  
 sotalol (BETAPACE) tablet 120 mg  120 mg Oral Q12H Other Studies: No results found for this visit on 01/17/20. Xr Chest Sngl V Result Date: 1/21/2020 EXAM: XR CHEST SNGL V INDICATION: SOB; possible CHF COMPARISON: 1/17/2020 FINDINGS: A portable AP radiograph of the chest was obtained at 0308 hours. The patient is on a cardiac monitor. Symmetric airspace disease. The cardiac and mediastinal contours and pulmonary vascularity are normal.  The bones and soft tissues are grossly within normal limits. IMPRESSION: Pulmonary edema. Assessment and Plan:  
 
Hospital Problems as of 1/21/2020 Date Reviewed: 7/15/2019 Codes Class Noted - Resolved POA * (Principal) Fall ICD-10-CM: W19. [place] Plan ICD-9-CM: E888.9  1/17/2020 - Present Yes Hematoma of right chest wall ICD-10-CM: D02.150O ICD-9-CM: 922.1  1/17/2020 - Present Yes Coagulopathy ([address] from Eliquis adn Indocin use ICD-10-CM: D68.9 ICD-9-CM: 286.9  1/17/2020 - Present Yes Acute pain of right knee ICD-10-CM: M25.561 ICD-9-CM: 719.46  1/17/2020 - Present Yes Debility ICD-10-CM: R53.81 ICD-9-CM: 799.3  1/17/2020 - Present Yes Fluid overload ICD-10-CM: E87.70 ICD-9-CM: 276.69  1/17/2020 - Present Yes A-fib [facility]) ICD-10-CM: I48.91 
ICD-9-CM: 427.31  1/17/2020 - Present Unknown Hypotension ICD-10-CM: I95.9 ICD-9-CM: 458.9  1/17/2020 - Present Unknown Anticoagulated ICD-10-CM: Z79.01 
ICD-9-CM: V58.61  1/17/2020 - Present Unknown Chest wall hematoma ICD-10-CM: S20.219A 
ICD-9-CM: 922.1  1/17/2020 - Present Unknown Blood loss anemia ICD-10-CM: D50.0 ICD-9-CM: 280.0  1/17/2020 - Present Unknown Acute blood loss anemia ICD-10-CM: D62 
ICD-9-CM: 285.1  1/17/2020 - Present Unknown Subcutaneous hematoma ICD-10-CM: T14. 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 
 
DC planning/Dispo:  pending Diet:  DIET CARDIAC 
DVT ppx:  SCD Signed: Joshua Garcia MD

## 2020-01-21 NOTE — PROGRESS NOTES
Nurse called respiratory to place patient back on BiPap due to patient increased confused, and O2 sat 88% on Hi Flow at 6 liters.

## 2020-01-21 NOTE — PROGRESS NOTES
Occupational Therapy Note: 
OT orders received, chart review initiated for evaluation. Patient with increased oxygen needs with RN requesting therapy to hold at this time per chart. Will check back as schedule permits and patient is available to initiate occupational therapy evaluation.   
Thank you for this consult,  
Livia Fernández, OTR/L

## 2020-01-21 NOTE — PROGRESS NOTES
END OF SHIFT NOTE: 
 
INTAKE/OUTPUT 
01/20 0701 - 01/21 0700 In: 7395 [I.V.:1090] Out: 8493 [YOCED:6341] Voiding: YES Catheter: NO 
Drain:   
 
 
 
 
 
Flatus: Patient does not have flatus present. Stool:  0 occurrences. Characteristics: 
Stool Assessment Stool Color: (have not observed) Emesis: 0 occurrences. Characteristics: VITAL SIGNS Patient Vitals for the past 12 hrs: 
 Temp Pulse Resp BP SpO2  
01/21/20 0537     96 % 01/21/20 0335     98 % 01/21/20 0332 98.8 °F (37.1 °C) 76 22 132/67 100 % 01/21/20 0015 98.5 °F (36.9 °C) 78 18 157/88 93 % 01/20/20 2352     95 % 01/20/20 2039 98.3 °F (36.8 °C) 80 18 164/81 91 % Pain Assessment Pain Intensity 1: 8 (01/20/20 2135) Pain Location 1: Knee, Leg 
Pain Intervention(s) 1: Medication (see MAR) Patient Stated Pain Goal: 0 Ambulating No 
Patient is currently on High flow 10 liters. Sats have improved. Continue to monitor. Shift report given to oncoming nurse at the bedside. Preston Grier

## 2020-01-21 NOTE — PROGRESS NOTES
CM notes on chart from previous admission to [address] [place] [address] from 12/21 to 12/23 that pt was set up with [facility] at discharge at that time. This CM called [facility] to confirm still providing services. They confirm pt is still active with their services under PT, OT, and RN. This CM reviewed with pt who is agreeable to have services restarted at discharge if therapy indicated. No additional discharge needs at this time. CM to continue to follow.

## 2020-01-21 NOTE — PROGRESS NOTES
Patient currently resting on Bipap. Sating 98% on 60. Color improving slowly. Continue with some mild labored breathing. Will continue to monitor.

## 2020-01-21 NOTE — PROGRESS NOTES
ICU nurse rounded on patient. Patient resting quietly in bed. ICU nurse to follow up with Dr Marily Villalobos for future plans.

## 2020-01-21 NOTE — PROGRESS NOTES
Patient continue to have SOB. O2 now 88%. See new orders for ABG's, cxr.  
PH 7.25 PCO2 48.7 PO2 50 Respiratory going to get bipap do u want him in the unit Md notified. Bipap is ok. Only send to unit if necessary with Bipap.

## 2020-01-21 NOTE — PROGRESS NOTES
H&P/Consult Note/Progress Note/Office Note:  
Anisa Giraldo  MRN: [id number]  N:[id number]  Age:79 y.o. 
 
HPI: Anisa Giraldo is a 70 y.o. male who on Eliquis for afib who came to the ER on 1/17/20 after a fall 1-2 hrs prior to arrival. 
He also takes Indocin He had swelling and hematoma of the right upper anterior chest 
He fell from a standing position while using a Rollator and landed on the device and hit is right anterior chest.  
He developed progressive severe swelling and hematoma He is on Eliquis for atrial fibrillation. Over the past 2 hours since the fall the hematoma has expanded to a small melon. Very painful and firm. No SOB. Denies any hip or back pain. Also has associated right knee pain No LOC or head trauma No blood loss. Point of impact: right chest wall. Pain location: right chest wall. The pain is moderate. Pertinent negatives include no visual change, no fever, no numbness, no abdominal pain, no nausea, no vomiting, no hematuria, no headaches The risk factors include being elderly. The symptoms are aggravated by pressure on injury. He has tried nothing for the symptoms. CXR and right knee xrays as below. Labs are pending 1/17/20 CXR, 1 views Hx: cough Comparison:  CXR 12/22/19 
  
There is mildly improved pulmonary vascular congestion since prior exam, however 
with continued central vascular congestion. Small basilar effusions are 
suggested particularly on the right. The cardiomediastinal silhouette is within 
normal limits. There are no acute osseous abnormalities. A left subclavian 
pacing device remains in place. 
  
Impression:  CHF/volume overload however improved over prior exam. 
  
 
  
1/17/20 right knee, 4 views No fracture or dislocation is identified. There is moderate to severe medial joint space loss. No evidence of joint effusion is demonstrated.   
There is incidental vascular calcification. 
  
 Impression: Moderate to severe medial joint space loss. 
  
 
1/18/20 some hypotension overnight; BNP>2100; cardiology consulted; Hgb stable; right chest wall hematoma much smaller; no new areas of pain after fall prior to admission 1/19/20 Cardiology OK with holding anticoagulation. Will likely nee PRBC today; defer to Hospitalists 1/20/20 Hgb 7.0 this am and Creatinine higher; transfuse 1 unit PRBC this am 
1/21/20 Hgb 8.9 after 1 unit PRBC Tx yesterday; hematoma stable Past Medical History:  
Diagnosis Date  Arrhythmia Paroxysmal Atrial fibrillation  Arthritis   
 hip and knee  CAD (coronary artery disease)  Cardiac pacemaker 11/12/2015  Chronic diastolic heart failure ([address] 11/12/2015  Chronic pain   
 hips & knees  Diabetes ([address]  Diabetes mellitus type II, uncontrolled ([address] adult onset  Dyspnea/shortness of breath 11/12/2015  GERD (gastroesophageal reflux disease)  Heart failure ([address]  HTN - uncontrolled, malignant 8/8/2016  Hyperlipidemia 11/12/2015  Hypertension   
 malignant, uncontrolled  Malaise/fatigue/weakness/tiredness 11/12/2015  Mixed hyperlipidemia  Morbid obesity ([address]  Orthostatic hypotension 11/12/2015  Other ill-defined conditions(799.89) mixed hyperlipidemia  Paroxysmal atrial fibrillation (HCC)   
 spontaneously converted from Afib to sinus with sotalol, no eCV  Permanent atrial fibrillation ([address] 11/12/2015  Tendon injury R foot  Unspecified sleep apnea Past Surgical History:  
Procedure Laterality Date  HX HERNIA REPAIR    
 HX OTHER SURGICAL    
 hemorrhoidectomy  HX PACEMAKER    
 AL LEFT HEART CATH,PERCUTANEOUS  3/31/2014 BMS to mid LAD Current Facility-Administered Medications Medication Dose Route Frequency  LORazepam (ATIVAN) tablet 1 mg  1 mg Oral Q6H PRN  
 0.9% sodium chloride infusion 250 mL  250 mL IntraVENous PRN  
  furosemide (LASIX) injection 40 mg  40 mg IntraVENous DAILY  0.9% sodium chloride infusion 250 mL  250 mL IntraVENous PRN  
 famotidine (PEPCID) tablet 20 mg  20 mg Oral BID  tuberculin injection 5 Units  5 Units IntraDERMal ONCE  
 albuterol-ipratropium (DUO-NEB) 2.5 MG-0.5 MG/3 ML  3 mL Nebulization Q4H PRN  
 gabapentin (NEURONTIN) capsule 300 mg  300 mg Oral Q12H  
 HYDROcodone-acetaminophen (NORCO) 7.5-325 mg per tablet 1 Tab  1 Tab Oral Q6H PRN  
 insulin lispro (HUMALOG) injection   SubCUTAneous AC&HS  ondansetron (ZOFRAN) injection 4 mg  4 mg IntraVENous Q6H PRN  probenecid (BENEMID) tablet 500 mg  500 mg Oral Q12H  
 sotalol (BETAPACE) tablet 120 mg  120 mg Oral Q12H Clindamycin; Demerol [meperidine]; Losartan-hydrochlorothiazide; and Pcn [penicillins] Social History Socioeconomic History  Marital status:  Spouse name: Not on file  Number of children: Not on file  Years of education: Not on file  Highest education level: Not on file Tobacco Use  Smoking status: Former Smoker Years: 8.00 Last attempt to quit: 3/25/1970 Years since quittin.8  Smokeless tobacco: Never Used Substance and Sexual Activity  Alcohol use: No  
 Drug use: No  
 
Social History Tobacco Use Smoking Status Former Smoker  Years: 8.00  Last attempt to quit: 3/25/1970  Years since quittin.8 Smokeless Tobacco Never Used Family History Problem Relation Age of Onset  Diabetes Mother  Cancer Father  Diabetes Brother  Hypertension Brother ROS: The patient has no difficulty with chest pain or shortness of breath. No fever or chills. Comprehensive review of systems was otherwise unremarkable except as noted above. Physical Exam:  
Visit Vitals /53 Pulse 81 Temp 98.3 °F (36.8 °C) Resp 20 Wt 335 lb 12.2 oz (152.3 kg) SpO2 99% BMI 41.97 kg/m² Vitals: 01/21/20 0335 01/21/20 6880 01/21/20 4215 01/21/20 1122 BP:   152/78 148/53 Pulse:   79 81 Resp:   20 20 Temp:   98.1 °F (36.7 °C) 98.3 °F (36.8 °C) SpO2: 98% 96% 96% 99% Weight:      
 
01/21 0701 - 01/21 1900 In: -  
Out: 1000 [Urine:1000] 01/19 1901 - 01/21 0700 In: 3433 [I.V.:3453] Out: 1400 [PZRDR:3001] Constitutional: Alert, weak, cooperative patient in no acute distress; appears stated age Eyes:Sclera are clear. EOMs intact ENMT: no external lesions gross hearing normal; no obvious neck masses, no ear or lip lesions, nares normal 
CV: RRR. Normal perfusion Resp: No JVD. Breathing is  non-labored; no audible wheezing. Large hematoma of right anterior chest size of a azael on admission--->lots of echymosis by 1/19/20 and 1/20/20(stable) No external bleeding GI: soft and non-distended Musculoskeletal: weak and deconditioned. No embolic signs or cyanosis. Right knee tender to palpation Neuro:  Oriented; moves all 4; no focal deficits Psychiatric: normal affect and mood, no memory impairment Recent vitals (if inpt): 
Patient Vitals for the past 24 hrs: 
 BP Temp Pulse Resp SpO2  
01/21/20 1122 148/53 98.3 °F (36.8 °C) 81 20 99 % 01/21/20 0712 152/78 98.1 °F (36.7 °C) 79 20 96 % 01/21/20 0537     96 % 01/21/20 0335     98 % 01/21/20 0332 132/67 98.8 °F (37.1 °C) 76 22 100 % 01/21/20 0015 157/88 98.5 °F (36.9 °C) 78 18 93 % 01/20/20 2352     95 % 01/20/20 2039 164/81 98.3 °F (36.8 °C) 80 18 91 % 01/20/20 1604 137/70 97.9 °F (36.6 °C) 80 18 91 % Labs: 
Recent Labs  
  01/21/20 
8843 WBC 15.9* HGB 8.9*  
   
K 4.9 * CO2 24 BUN 18 CREA 1.37 * PTP 14.3 INR 1.1 TBILI 1.0  
SGOT 21 ALT 12  
AP 40* Lab Results Component Value Date/Time  WBC 15.9 (H) 01/21/2020 06:27 AM  
 HGB 8.9 (L) 01/21/2020 06:27 AM  
 PLATELET 411 97/35/3340 06:27 AM  
 Sodium 139 01/21/2020 06:27 AM  
 Potassium 4.9 01/21/2020 06:27 AM  
 Chloride 108 (H) 01/21/2020 06:27 AM  
 CO2 24 01/21/2020 06:27 AM  
 BUN 18 01/21/2020 06:27 AM  
 Creatinine 1.37 01/21/2020 06:27 AM  
 Glucose 177 (H) 01/21/2020 06:27 AM  
 INR 1.1 01/21/2020 06:27 AM  
 aPTT 33.1 01/18/2020 03:35 AM  
 Bilirubin, total 1.0 01/21/2020 06:27 AM  
 AST (SGOT) 21 01/21/2020 06:27 AM  
 ALT (SGPT) 12 01/21/2020 06:27 AM  
 Alk. phosphatase 40 (L) 01/21/2020 06:27 AM  
 Troponin-I, Qt. <0.02 (L) 12/21/2019 10:05 PM  
 
 
CT Results  (Last 48 hours) None  
  
 
chest X-ray I reviewed recent labs, recent radiologic studies, and pertinent records including other doctor notes if needed. I independently reviewed radiology images for studies I described above or studies I have ordered. Admission date (for inpatients): 1/17/2020 * No surgery found *  * No surgery found * ASSESSMENT/PLAN: 
Problem List  Date Reviewed: 7/15/2019 Codes Class Noted * (Principal) Fall ICD-10-CM: W19. [address] [place] ICD-9-CM: S093.6  1/17/2020 Hematoma of right chest wall ICD-10-CM: R85.426L ICD-9-CM: 922.1  1/17/2020 Coagulopathy ([facility] from Eliquis adn Indocin use ICD-10-CM: D68.9 ICD-9-CM: 286.9  1/17/2020 Acute pain of right knee ICD-10-CM: M25.561 ICD-9-CM: 719.46  1/17/2020 Debility ICD-10-CM: R53.81 ICD-9-CM: 799.3  1/17/2020 Fluid overload ICD-10-CM: E87.70 ICD-9-CM: 276.69  1/17/2020 [facility]) ICD-10-CM: I48.91 
ICD-9-CM: 427.31  1/17/2020 Hypotension ICD-10-CM: I95.9 ICD-9-CM: 458.9  1/17/2020 Anticoagulated ICD-10-CM: Z79.01 
ICD-9-CM: V58.61  1/17/2020 Chest wall hematoma ICD-10-CM: S20.219A 
ICD-9-CM: 922.1  1/17/2020 Blood loss anemia ICD-10-CM: D50.0 ICD-9-CM: 280.0  1/17/2020 Acute blood loss anemia ICD-10-CM: D62 
ICD-9-CM: 285.1  1/17/2020 Subcutaneous hematoma ICD-10-CM: T14. Jen Oj ICD-9-CM: 924.9  1/17/2020 Hypoglycemia ICD-10-CM: E16.2 ICD-9-CM: 251.2  12/22/2019 Syncope ICD-10-CM: R55 
ICD-9-CM: 780.2  12/22/2019 Morbid obesity ([facility] ICD-10-CM: E66.01 
ICD-9-CM: 278.01  Unknown Paroxysmal atrial fibrillation (HCC) ICD-10-CM: I48.0 ICD-9-CM: 427.31  Unknown Overview Signed 8/8/2016 11:37 AM by Bessy Hargrove  
  spontaneously converted from Afib to sinus with sotalol, no eCV HTN - uncontrolled, malignant ICD-10-CM: I10 
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 
ICD-9-CM: 428.32  11/12/2015 Overview Signed 11/12/2015 10:24 AM by Cruz Larry Echo 8/15:  EF 50-55%, mild MR Echo 3/14:  EF 50-55%, mild to mod MR and TR, RVSP 46 mmHg A/C DHF 3/2014 admission, before [facility] Malaise/fatigue/weakness/tiredness ICD-10-CM: P70.14 ICD-9-CM: 780.79  11/12/2015 Dyspnea/shortness of breath ICD-10-CM: R06.00 
ICD-9-CM: 786.09  11/12/2015 Post PTCA ICD-10-CM: V12.47 ICD-9-CM: V45.82  4/1/2014 Coronary atherosclerosis of native coronary artery ICD-10-CM: I25.10 ICD-9-CM: 414.01  4/1/2014 Overview Signed 11/12/2015 10:21 AM by Cruz Juarez [facility] 3/31/14: PCI of mid LAD with BMS, mild dz in other vessels Acute respiratory failure ([facility] ICD-10-CM: J96.00 
ICD-9-CM: 518.81  3/28/2014 DM (diabetes mellitus) ([facility] ICD-10-CM: E11.9 ICD-9-CM: 250.00  3/28/2014 Principal Problem: 
  Fall (1/17/2020) Active Problems: 
  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 Hematoma of right chest wall (1/17/2020) Coagulopathy ([address] from Eliquis adn Indocin use (1/17/2020) Acute pain of right knee (1/17/2020) Debility (1/17/2020) Fluid overload (1/17/2020) A-fib ([address] (1/17/2020) Hypotension (1/17/2020) Anticoagulated (1/17/2020) Chest wall hematoma (1/17/2020) Blood loss anemia (1/17/2020) Acute blood loss anemia (1/17/2020) Subcutaneous hematoma (1/17/2020) Fall on anticoagulants (Eliquis and Indocin) with large right chest wall hematoma Debilitated patient with mult comorbidities In CCU initially; now in floor bed BNP>2100 initially Cardiology OK with stopping anticoagulation for now He is off Eliquis INR is 1.5 for no apparent reason-->Vit K 5mg ordered on 1/20/20 Hgb this am 7.0 and Cr higher at 1.53 yesterday (am lytes pending) -->I ordered 1 unit PRBC on 1/20/20 Hgb now 8.9 PO cardiac diet Hypomagnesemia 
2gm Mag sulfate IVPB x 2 ordered today Coagulopathy secondary to Eliquis and NSAID use Hold Eliquis, NSAIDs, ASA and all anticoagulants for now Cardiology OK with this Debility/Fall Consult PT May need SNF Acute right knee pain after fall Xray with no fracture Pain meds prn I have personally performed a face-to-face diagnostic evaluation and management  service on this patient. I have independently seen the patient. I have independently obtained the above history from the patient/family. I have independently examined the patient with above findings. I have independently reviewed data/labs for this patient and developed the above plan of care (MDM). Signed: Karin Adam.  Jonathan Ramírez MD, FACS

## 2020-01-21 NOTE — PROGRESS NOTES
Problem: Diabetes Self-Management Goal: *Disease process and treatment process Description Define diabetes and identify own type of diabetes; list 3 options for treating diabetes. Outcome: Progressing Towards Goal 
Goal: *Incorporating nutritional management into lifestyle Description Describe effect of type, amount and timing of food on blood glucose; list 3 methods for planning meals. Outcome: Progressing Towards Goal 
Goal: *Incorporating physical activity into lifestyle Description State effect of exercise on blood glucose levels. Outcome: Progressing Towards Goal 
Goal: *Developing strategies to promote health/change behavior Description Define the ABC's of diabetes; identify appropriate screenings, schedule and personal plan for screenings. Outcome: Progressing Towards Goal 
Goal: *Using medications safely Description State effect of diabetes medications on diabetes; name diabetes medication taking, action and side effects. Outcome: Progressing Towards Goal 
Goal: *Monitoring blood glucose, interpreting and using results Description Identify recommended blood glucose targets  and personal targets. Outcome: Progressing Towards Goal 
Goal: *Prevention, detection, treatment of acute complications Description List symptoms of hyper- and hypoglycemia; describe how to treat low blood sugar and actions for lowering  high blood glucose level. Outcome: Progressing Towards Goal 
Goal: *Prevention, detection and treatment of chronic complications Description Define the natural course of diabetes and describe the relationship of blood glucose levels to long term complications of diabetes. Outcome: Progressing Towards Goal 
Goal: *Developing strategies to address psychosocial issues Description Describe feelings about living with diabetes; identify support needed and support network Outcome: Progressing Towards Goal 
Goal: *Insulin pump training Outcome: Progressing Towards Goal 
 Goal: *Sick day guidelines Outcome: Progressing Towards Goal 
Goal: *Patient Specific Goal (EDIT GOAL, INSERT TEXT) Outcome: Progressing Towards Goal

## 2020-01-22 NOTE — PROGRESS NOTES
This CM met with pt and spouse at bedside this day to discuss discharge planning. It is indicated by PT and OT that pt would benefit from STR at [facility]. Both pt and spouse are agreeable with recommendations. This CM provided pt and spouse with SNF provider list and requested pt's 3 preferred SNF's that they would like referrals sent to. No additional discharge needs voiced for CM at this time. They will let this CM know their choices tentatively tomorrow.

## 2020-01-22 NOTE — PROGRESS NOTES
Occupational Therapy Note: 
OT orders received, chart review initiated for evaluation. Patient getting chest xray at this time per chart. Will check back as schedule permits and patient is available to initiate occupational therapy evaluation.   
Thank you for this consult,  
Livia Fernández, OTR/L

## 2020-01-22 NOTE — PROGRESS NOTES
H&P/Consult Note/Progress Note/Office Note:  
Manuela Strong  MRN: [id number]  JAMISON:[birth year]  Age:79 y.o. 
 
HPI: Manuela Strong is a 70 y.o. male who on Eliquis for afib who came to the ER on 1/17/20 after a fall 1-2 hrs prior to arrival. 
He also takes Indocin He had swelling and hematoma of the right upper anterior chest 
He fell from a standing position while using a Rollator and landed on the device and hit is right anterior chest.  
He developed progressive severe swelling and hematoma He is on Eliquis for atrial fibrillation. Over the past 2 hours since the fall the hematoma has expanded to a small melon. Very painful and firm. No SOB. Denies any hip or back pain. Also has associated right knee pain No LOC or head trauma No blood loss. Point of impact: right chest wall. Pain location: right chest wall. The pain is moderate. Pertinent negatives include no visual change, no fever, no numbness, no abdominal pain, no nausea, no vomiting, no hematuria, no headaches The risk factors include being elderly. The symptoms are aggravated by pressure on injury. He has tried nothing for the symptoms. CXR and right knee xrays as below. Labs are pending 1/17/20 CXR, 1 views Hx: cough Comparison:  CXR 12/22/19 
  
There is mildly improved pulmonary vascular congestion since prior exam, however 
with continued central vascular congestion. Small basilar effusions are 
suggested particularly on the right. The cardiomediastinal silhouette is within 
normal limits. There are no acute osseous abnormalities. A left subclavian 
pacing device remains in place. 
  
Impression:  CHF/volume overload however improved over prior exam. 
  
 
  
1/17/20 right knee, 4 views No fracture or dislocation is identified. There is moderate to severe medial joint space loss. No evidence of joint effusion is demonstrated.   
There is incidental vascular calcification. 
  
 Impression: Moderate to severe medial joint space loss. 
  
 
1/18/20 some hypotension overnight; BNP>2100; cardiology consulted; Hgb stable; right chest wall hematoma much smaller; no new areas of pain after fall prior to admission 1/19/20 Cardiology OK with holding anticoagulation. Will likely nee PRBC today; defer to Hospitalists 1/20/20 Hgb 7.0 this am and Creatinine higher; transfuse 1 unit PRBC this am 
1/21/20 Hgb 8.9 after 1 unit PRBC Tx yesterday; hematoma stable 1/22/20 Hgb 8.7; hematoma stable Past Medical History:  
Diagnosis Date  Arrhythmia Paroxysmal Atrial fibrillation  Arthritis   
 hip and knee  CAD (coronary artery disease)  Cardiac pacemaker 11/12/2015  Chronic diastolic heart failure ([address] 11/12/2015  Chronic pain   
 hips & knees  Diabetes ([address]  Diabetes mellitus type II, uncontrolled ([address] adult onset  Dyspnea/shortness of breath 11/12/2015  GERD (gastroesophageal reflux disease)  Heart failure ([address]  HTN - uncontrolled, malignant 8/8/2016  Hyperlipidemia 11/12/2015  Hypertension   
 malignant, uncontrolled  Malaise/fatigue/weakness/tiredness 11/12/2015  Mixed hyperlipidemia  Morbid obesity ([address]  Orthostatic hypotension 11/12/2015  Other ill-defined conditions(799.89) mixed hyperlipidemia  Paroxysmal atrial fibrillation (HCC)   
 spontaneously converted from Afib to sinus with sotalol, no eCV  Permanent atrial fibrillation ([address] 11/12/2015  Tendon injury R foot  Unspecified sleep apnea Past Surgical History:  
Procedure Laterality Date  HX HERNIA REPAIR    
 HX OTHER SURGICAL    
 hemorrhoidectomy  HX PACEMAKER    
 OK LEFT HEART CATH,PERCUTANEOUS  3/31/2014 BMS to mid LAD Current Facility-Administered Medications Medication Dose Route Frequency  LORazepam (ATIVAN) tablet 1 mg  1 mg Oral Q6H PRN  
 0.9% sodium chloride infusion 250 mL  250 mL IntraVENous PRN  
  furosemide (LASIX) injection 40 mg  40 mg IntraVENous Q12H  
 0.9% sodium chloride infusion 250 mL  250 mL IntraVENous PRN  
 famotidine (PEPCID) tablet 20 mg  20 mg Oral BID  albuterol-ipratropium (DUO-NEB) 2.5 MG-0.5 MG/3 ML  3 mL Nebulization Q4H PRN  
 gabapentin (NEURONTIN) capsule 300 mg  300 mg Oral Q12H  
 HYDROcodone-acetaminophen (NORCO) 7.5-325 mg per tablet 1 Tab  1 Tab Oral Q6H PRN  
 insulin lispro (HUMALOG) injection   SubCUTAneous AC&HS  ondansetron (ZOFRAN) injection 4 mg  4 mg IntraVENous Q6H PRN  probenecid (BENEMID) tablet 500 mg  500 mg Oral Q12H  
 sotalol (BETAPACE) tablet 120 mg  120 mg Oral Q12H Clindamycin; Demerol [meperidine]; Losartan-hydrochlorothiazide; and Pcn [penicillins] Social History Socioeconomic History  Marital status:  Spouse name: Not on file  Number of children: Not on file  Years of education: Not on file  Highest education level: Not on file Tobacco Use  Smoking status: Former Smoker Years: 8. Last attempt to quit: 3/25/1970 Years since quittin.8  Smokeless tobacco: Never Used Substance and Sexual Activity  Alcohol use: No  
 Drug use: No  
 
Social History Tobacco Use Smoking Status Former Smoker  Years: 8.  Last attempt to quit: 3/25/1970  Years since quittin.8 Smokeless Tobacco Never Used Family History Problem Relation Age of Onset  Diabetes Mother  Cancer Father  Diabetes Brother  Hypertension Brother ROS: The patient has no difficulty with chest pain or shortness of breath. No fever or chills. Comprehensive review of systems was otherwise unremarkable except as noted above. Physical Exam:  
Visit Vitals /80 Pulse 87 Temp 97.4 °F (36.3 °C) Resp 20 Wt 335 lb 12.2 oz (152.3 kg) SpO2 97% BMI 41.97 kg/m² Vitals:  
 208 20 8388 20 1914 BP: 110/64 152/83 (!) 149/92 142/80 Pulse: 77 76 80 87 Resp: 20 20 20 20 Temp: 97.4 °F (36.3 °C) 97.4 °F (36.3 °C) 97.9 °F (36.6 °C) 97.4 °F (36.3 °C) SpO2: 98% 96% 99% 97% Weight:      
 
01/22 0701 - 01/22 1900 In: -  
Out: 50 [Urine:50] 
01/20 1901 - 01/22 0700 In: 8489 [I.V.:1090] Out: 1200 [Urine:1200] Constitutional: Alert, weak, cooperative patient in no acute distress; appears stated age Eyes:Sclera are clear. EOMs intact ENMT: no external lesions gross hearing normal; no obvious neck masses, no ear or lip lesions, nares normal 
CV: RRR. Normal perfusion Resp: No JVD. Breathing is  non-labored; no audible wheezing. Large hematoma of right anterior chest size of a azael on admission--->lots of echymosis by 1/19/20 and 1/20/20(stable) No external bleeding GI: soft and non-distended Musculoskeletal: weak and deconditioned. No embolic signs or cyanosis. Right knee tender to palpation Neuro:  Oriented; moves all 4; no focal deficits Psychiatric: normal affect and mood, no memory impairment Recent vitals (if inpt): 
Patient Vitals for the past 24 hrs: 
 BP Temp Pulse Resp SpO2  
01/22/20 0716 142/80 97.4 °F (36.3 °C) 87 20 97 % 01/22/20 0444 (!) 149/92 97.9 °F (36.6 °C) 80 20 99 % 01/21/20 2358 152/83 97.4 °F (36.3 °C) 76 20 96 % 01/21/20 2059 110/64 97.4 °F (36.3 °C) 77 20 98 % 01/21/20 1542 151/83 98.2 °F (36.8 °C) 77 20 94 % 01/21/20 1431     93 % 01/21/20 1122 148/53 98.3 °F (36.8 °C) 81 20 99 % Labs: 
Recent Labs  
  01/22/20 
0516 01/21/20 
3257 WBC 14.4* 15.9* HGB 8.7* 8.9*  
 268  139  
K 4.7 4.9  108* CO2 25 24 BUN 28* 18  
CREA 1.39 1.37 * 177* PTP  --  14.3 INR  --  1.1 TBILI 1.1 1.0  
SGOT 16 21 ALT 11* 12  
AP 37* 40* Lab Results Component Value Date/Time  WBC 14.4 (H) 01/22/2020 05:16 AM  
 HGB 8.7 (L) 01/22/2020 05:16 AM  
 PLATELET 567 59/69/6419 05:16 AM  
 Sodium 138 01/22/2020 05:16 AM  
 Potassium 4.7 01/22/2020 05:16 AM  
 Chloride 106 01/22/2020 05:16 AM  
 CO2 25 01/22/2020 05:16 AM  
 BUN 28 (H) 01/22/2020 05:16 AM  
 Creatinine 1.39 01/22/2020 05:16 AM  
 Glucose 117 (H) 01/22/2020 05:16 AM  
 INR 1.1 01/21/2020 06:27 AM  
 aPTT 33.1 01/18/2020 03:35 AM  
 Bilirubin, total 1.1 01/22/2020 05:16 AM  
 AST (SGOT) 16 01/22/2020 05:16 AM  
 ALT (SGPT) 11 (L) 01/22/2020 05:16 AM  
 Alk. phosphatase 37 (L) 01/22/2020 05:16 AM  
 Troponin-I, Qt. <0.02 (L) 12/21/2019 10:05 PM  
 
 
CT Results  (Last 48 hours) None  
  
 
chest X-ray I reviewed recent labs, recent radiologic studies, and pertinent records including other doctor notes if needed. I independently reviewed radiology images for studies I described above or studies I have ordered. Admission date (for inpatients): 1/17/2020 * No surgery found *  * No surgery found * ASSESSMENT/PLAN: 
Problem List  Date Reviewed: 7/15/2019 Codes Class Noted * (Principal) Fall ICD-10-CM: W19. Jimmy Grief ICD-9-CM: N969.5  1/17/2020 Hematoma of right chest wall ICD-10-CM: I61.015R ICD-9-CM: 922.1  1/17/2020 Coagulopathy ([facility] from Eliquis adn Indocin use ICD-10-CM: D68.9 ICD-9-CM: 286.9  1/17/2020 Acute pain of right knee ICD-10-CM: M25.561 ICD-9-CM: 719.46  1/17/2020 Debility ICD-10-CM: R53.81 ICD-9-CM: 799.3  1/17/2020 Fluid overload ICD-10-CM: E87.70 ICD-9-CM: 276.69  1/17/2020 [facility]) ICD-10-CM: I48.91 
ICD-9-CM: 427.31  1/17/2020 Hypotension ICD-10-CM: I95.9 ICD-9-CM: 458.9  1/17/2020 Anticoagulated ICD-10-CM: Z79.01 
ICD-9-CM: V58.61  1/17/2020 Chest wall hematoma ICD-10-CM: S20.219A 
ICD-9-CM: 922.1  1/17/2020 Blood loss anemia ICD-10-CM: D50.0 ICD-9-CM: 280.0  1/17/2020 Acute blood loss anemia ICD-10-CM: D62 
ICD-9-CM: 285.1  1/17/2020 Subcutaneous hematoma ICD-10-CM: T14. Zula Chalet ICD-9-CM: 924.9  1/17/2020 Hypoglycemia ICD-10-CM: E16.2 ICD-9-CM: 251.2  12/22/2019 Syncope ICD-10-CM: R55 
ICD-9-CM: 780.2  12/22/2019 Morbid obesity ([facility] ICD-10-CM: E66.01 
ICD-9-CM: 278.01  Unknown Paroxysmal atrial fibrillation (HCC) ICD-10-CM: I48.0 ICD-9-CM: 427.31  Unknown Overview Signed 8/8/2016 11:37 AM by Chaya Solano  
  spontaneously converted from Afib to sinus with sotalol, no eCV HTN - uncontrolled, malignant ICD-10-CM: I10 
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 
ICD-9-CM: 428.32  11/12/2015 Overview Signed 11/12/2015 10:24 AM by Ai Astorga Echo 8/15:  EF 50-55%, mild MR Echo 3/14:  EF 50-55%, mild to mod MR and TR, RVSP 46 mmHg A/C DHF 3/2014 admission, before [facility] Malaise/fatigue/weakness/tiredness ICD-10-CM: N93.45 ICD-9-CM: 780.79  11/12/2015 Dyspnea/shortness of breath ICD-10-CM: R06.00 
ICD-9-CM: 786.09  11/12/2015 Post PTCA ICD-10-CM: P62.99 ICD-9-CM: V45.82  4/1/2014 Coronary atherosclerosis of native coronary artery ICD-10-CM: I25.10 ICD-9-CM: 414.01  4/1/2014 Overview Signed 11/12/2015 10:21 AM by Ai Astorga [facility] 3/31/14: PCI of mid LAD with BMS, mild dz in other vessels Acute respiratory failure ([facility] ICD-10-CM: J96.00 
ICD-9-CM: 518.81  3/28/2014 DM (diabetes mellitus) ([facility] ICD-10-CM: E11.9 ICD-9-CM: 250.00  3/28/2014 Principal Problem: 
  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 Hematoma of right chest wall (1/17/2020) Coagulopathy ([address] from Eliquis adn Indocin use (1/17/2020) Acute pain of right knee (1/17/2020) Debility (1/17/2020) Fluid overload (1/17/2020) A-fib ([address] (1/17/2020) Hypotension (1/17/2020) Anticoagulated (1/17/2020) Chest wall hematoma (1/17/2020) Blood loss anemia (1/17/2020) Acute blood loss anemia (1/17/2020) Subcutaneous hematoma (1/17/2020) Fall on anticoagulants (Eliquis and Indocin) with large right chest wall hematoma Debilitated patient with mult comorbidities In CCU initially; now in floor bed BNP>2100 initially Cardiology OK with stopping anticoagulation for now He is off Eliquis INR is 1.5 for no apparent reason-->Vit K 5mg ordered on 1/20/20 Hgb this am 7.0 and Cr higher at 1.53 yesterday (am lytes pending) -->I ordered 1 unit PRBC on 1/20/20 Hgb now 8.7 PO cardiac diet NO PLANS for surgery Will sign off Call if needed Hypomagnesemia 
2gm Mag sulfate IVPB x 2 ordered today Coagulopathy secondary to Eliquis and NSAID use Hold Eliquis, NSAIDs, ASA and all anticoagulants for now Cardiology OK with this Debility/Fall Consult PT May need SNF Acute right knee pain after fall Xray with no fracture Pain meds prn I have personally performed a face-to-face diagnostic evaluation and management  service on this patient. I have independently seen the patient. I have independently obtained the above history from the patient/family. I have independently examined the patient with above findings. I have independently reviewed data/labs for this patient and developed the above plan of care (MDM). Signed: Swetha Stark MD, FACS

## 2020-01-22 NOTE — PROGRESS NOTES
Patient refuses to wear Bipap at this time. States that he didn't wear bipap last night. Patient was notified to call Respiratory if he changes his mind.

## 2020-01-22 NOTE — PROGRESS NOTES
Yesterdays results compared to today. Results for Mitzy Church (MRN [id number]) as of 1/22/2020 04:39 Ref. Range 1/21/2020 03:18  
pH (POC) Latest Ref Range: 7.35 - 7.45   7. 256 (L)  
pCO2 (POC) Latest Ref Range: 35 - 45 MMHG 48.7 (H)  
pO2 (POC) Latest Ref Range: 75 - 100 MMHG 50 (L) HCO3 (POC) Latest Ref Range: 22 - 26 MMOL/L 21.7 (L)  
sO2 (POC) Latest Ref Range: 95 - 98 % 79 (L) Base deficit (POC) Latest Units: mmol/L 5 Specimen type (POC) Latest Units:   ARTERIAL Flow rate (POC) Latest Units: L/min 3.000 Site Latest Units:   RIGHT RADIAL Device: Latest Units:   NASAL CANNULA Allens test (POC) Latest Units:   YES Respiratory comment: Unknown NurseNotified

## 2020-01-22 NOTE — PROGRESS NOTES
END OF SHIFT NOTE: 
 
INTAKE/OUTPUT 
01/21 0701 - 01/22 0700 In: -  
Out: 1000 [Urine:1000] Voiding: YES Catheter: NO 
Drain:   
 
 
 
 
 
Flatus: Patient does have flatus present. Stool:  0 occurrences. Characteristics: 
Stool Assessment Stool Color: Magalene Poplin Stool Appearance: Formed Stool Amount: Medium Stool Source/Status: Rectum Emesis: 0 occurrences. Characteristics: VITAL SIGNS Patient Vitals for the past 12 hrs: 
 Temp Pulse Resp BP SpO2  
01/22/20 0444 97.9 °F (36.6 °C) 80 20 (!) 149/92 99 % 01/21/20 2358 97.4 °F (36.3 °C) 76 20 152/83 96 % 01/21/20 2059 97.4 °F (36.3 °C) 77 20 110/64 98 % Pain Assessment Pain Intensity 1: 0 (01/22/20 0200) Pain Location 1: Knee Pain Intervention(s) 1: Medication (see MAR) Patient Stated Pain Goal: 0 Ambulating No 
Patient very agitated this shift. Increased confusion noted. Md notified. New orders noted. Shift report given to oncoming nurse at the bedside. Preston Grier

## 2020-01-22 NOTE — PROGRESS NOTES
Today's results Results for Timothy Ruffin (MRN [id number]) as of 1/22/2020 05:05 Ref. Range 1/22/2020 04:36  
pH (POC) Latest Ref Range: 7.35 - 7.45   7.342 (L)  
pCO2 (POC) Latest Ref Range: 35 - 45 MMHG 46.6 (H) pO2 (POC) Latest Ref Range: 75 - 100 MMHG 126 (H) HCO3 (POC) Latest Ref Range: 22 - 26 MMOL/L 25.3  
sO2 (POC) Latest Ref Range: 95 - 98 % 99 (H) Base deficit (POC) Latest Units: mmol/L 1 Specimen type (POC) Latest Units:   ARTERIAL Flow rate (POC) Latest Units: L/min 10.000 Site Latest Units:   LEFT RADIAL Device: Latest Units:   High Flow Nasal Cannula Allens test (POC) Latest Units:   YES Respiratory comment: Unknown NurseNotified

## 2020-01-22 NOTE — PROGRESS NOTES
Problem: Mobility Impaired (Adult and Pediatric) Goal: *Acute Goals and Plan of Care (Insert Text) Description Goals: 
(1.)Mr. Magan Gleason will move from supine to sit and sit to supine , scoot up and down and roll side to side with CONTACT GUARD ASSIST within 5 treatment day(s). (2.)Mr. Magan Gleason will transfer from bed to chair and chair to bed with MINIMAL ASSIST using the least restrictive device within 5 treatment day(s). (3.)Mr. Magan Gleason will ambulate with MINIMAL ASSIST for 25-50 feet with the least restrictive device within 5 treatment day(s). PHYSICAL THERAPY: Initial Assessment and AM 1/22/2020 INPATIENT:   
[place] / [place] / Product Type: Medicare /   
  
NAME/AGE/GENDER: Beth Contreras is a 70 y.o. male PRIMARY DIAGNOSIS: Chest wall hematoma [S20.219A] Hypotension [I95.9] A-fib ([facility] [address] [I48.91] Anticoagulated [Z79.01] Acute blood loss anemia [D62] Subcutaneous hematoma [T14. Herve Bitters Fall Fall ICD-10: Treatment Diagnosis:  
 · Generalized Muscle Weakness (M62.81) · Difficulty in walking, Not elsewhere classified (R26.2) · Repeated Falls (R29.6) Precaution/Allergies: 
Clindamycin; Demerol [meperidine]; Losartan-hydrochlorothiazide; and Pcn [penicillins] ASSESSMENT:  
 
Mr. Magan Gleason is a 70year old WM with an admitting diagnosis of Chest wall hematoma secondary to a fall at home with his rollator. His PMH includes DM2, A-fib, pacemaker, CHF, obesity and falls. He presents today in supine getting his bath with the CNA at the bedside. PT assisted with bed mobility as the CNA finished his bath and changing of the bedding. He reports he lives with his wife in a 1 level home with 1 step to enter. His PLOF has been with use of his rollator and he reports he has a power scooter that he uses in his home and in the community. He reports several falls at home.   He reports he only walks short distances at home from one room to another. Today he is on 10L of Hi-flow O2 with his sats at 97%. He participated in 162 VirnetX exercises in supine and sitting. He required mod/max assist x 2 for supine to sit with good static sitting balance. He was very nervous about attempting to stand, afraid of his legs \"giving way again\". Sit to stand with use of bed elevation was mod/max assist x 2. He needed additional time to achieve upright standing using the r/walker for UE support. He was able to manage 4 shuffle steps with mod. Assist x 2 using the r/walker to transition from the bed to the chair. He was positioned for comfort and sat up ~ 2.5 hours, then PT and a PTA returned to the room to assist nursing with sit to stand from the bedside chair with mod/max assist x 3 then he could only pivot on his feet slowly and sit on the bed with little control. He was max assist x 2 for sit to supine and he was positioned for comfort with the call light and his personal items in reach. Mr. Jose Daniel Alfaro would benefit from continued skilled PT to maximize his functional abilities while in the hospital.  He cannot mange his size and weight at this time and remains limited with mobility and endurance. Safety is a primary concern with his mobility. This section established at most recent assessment PROBLEM LIST (Impairments causing functional limitations): 1. Decreased Strength 2. Decreased ADL/Functional Activities 3. Decreased Transfer Abilities 4. Decreased Ambulation Ability/Technique 5. Decreased Activity Tolerance INTERVENTIONS PLANNED: (Benefits and precautions of physical therapy have been discussed with the patient.) 1. Bed Mobility 2. Therapeutic Activites 3. Therapeutic Exercise/Strengthening 4. Transfer Training TREATMENT PLAN: Frequency/Duration: 3 times a week for duration of hospital stay Rehabilitation Potential For Stated Goals: Fair REHAB RECOMMENDATIONS (at time of discharge pending progress):   
Placement: It is my opinion, based on this patient's performance to date, that Mr. Jhonatan Flower may benefit from intensive therapy at a [address] [place] [address] after discharge due to the functional deficits listed above that are likely to improve with skilled rehabilitation and concerns that he/she may be unsafe to be unsupervised at home due to weakness and history of falls. Equipment:  
? None at this time HISTORY:  
History of Present Injury/Illness (Reason for Referral): Gus Pennington is a 70 y.o. male who on Eliquis for afib who came to the ER on 1/17/20 after a fall 1-2 hrs prior to arrival. 
He also takes Indocin He had swelling and hematoma of the right upper anterior chest 
He fell from a standing position while using a Rollator and landed on the device and hit is right anterior chest.  
He developed progressive severe swelling and hematoma He is on Eliquis for atrial fibrillation.  Over the past 2 hours since the fall the hematoma has expanded to a small melon. Very painful and firm. No SOB. Denies any hip or back pain. Also has associated right knee pain No LOC or head trauma Past Medical History/Comorbidities:  
Mr. Jhonatan Flower  has a past medical history of Arrhythmia, Arthritis, CAD (coronary artery disease), Cardiac pacemaker (11/12/2015), Chronic diastolic heart failure ([address] (11/12/2015), Chronic pain, Diabetes ([address] Diabetes mellitus type II, uncontrolled ([address] Dyspnea/shortness of breath (11/12/2015), GERD (gastroesophageal reflux disease), Heart failure ([address] HTN - uncontrolled, malignant (8/8/2016), Hyperlipidemia (11/12/2015), Hypertension, Malaise/fatigue/weakness/tiredness (11/12/2015), Mixed hyperlipidemia, Morbid obesity ([address] Orthostatic hypotension (11/12/2015), Other ill-defined conditions(799.89), Paroxysmal atrial fibrillation ([address] Permanent atrial fibrillation ([address] (11/12/2015), Tendon injury, and Unspecified sleep apnea.   Mr. Jhonatan Flower  has a past surgical history that includes hx other surgical; hx hernia repair; pr left heart cath,percutaneous (3/31/2014); and hx pacemaker. Social History/Living Environment:  
Home Environment: Private residence One/Two Story Residence: One story Living Alone: No 
Support Systems: Spouse/Significant Other/Partner, Child(jewels) Patient Expects to be Discharged to[de-identified] Unknown Current DME Used/Available at Home: Amaya Hylan, rollator, Wheelchair, power Prior Level of Function/Work/Activity: 
Patient reports he has been ambulating with a rollator at home and uses his power scooter but her also reports several falls. Number of Personal Factors/Comorbidities that affect the Plan of Care: 3+: HIGH COMPLEXITY EXAMINATION:  
Most Recent Physical Functioning:  
Gross Assessment: 
AROM: Generally decreased, functional 
PROM: Generally decreased, functional 
Strength: Generally decreased, functional 
Coordination: Grossly decreased, non-functional 
Tone: Normal 
Sensation: Intact Posture: 
  
Balance: 
Sitting: Impaired Sitting - Static: Good (unsupported) Sitting - Dynamic: Fair (occasional) Standing: Impaired Standing - Static: Fair;Constant support Standing - Dynamic : Fair Bed Mobility: 
Rolling: Moderate assistance Supine to Sit: Moderate assistance;Maximum assistance;Assist x2 Scooting: Maximum assistance;Assist x1 Wheelchair Mobility: 
  
Transfers: 
Sit to Stand: Moderate assistance;Maximum assistance;Assist x2 Stand to Sit: Maximum assistance;Assist x2 Bed to Chair: Moderate assistance;Maximum assistance;Assist x2 Gait: 
  
Base of Support: Widened Speed/Edith: Slow;Shuffled Step Length: Left shortened;Right shortened Gait Abnormalities: Decreased step clearance Distance (ft): 4 Feet (ft) Assistive Device: Walker, rolling Ambulation - Level of Assistance: Moderate assistance;Maximum assistance;Assist x2 Body Structures Involved: 1. Muscles Body Functions Affected: 1. Neuromusculoskeletal 
2. Movement Related Activities and Participation Affected: 1. General Tasks and Demands 2. Mobility 3. Self Care Number of elements that affect the Plan of Care: 3: MODERATE COMPLEXITY CLINICAL PRESENTATION:  
Presentation: Evolving clinical presentation with changing clinical characteristics: MODERATE COMPLEXITY CLINICAL DECISION MAKIN[address] [place] [id number] AM-PAC 6 Clicks Basic Mobility Inpatient Short Form How much difficulty does the patient currently have. .. Unable A Lot A Little None 1. Turning over in bed (including adjusting bedclothes, sheets and blankets)? [] 1   [x] 2   [] 3   [] 4  
2. Sitting down on and standing up from a chair with arms ( e.g., wheelchair, bedside commode, etc.)   [] 1   [x] 2   [] 3   [] 4  
3. Moving from lying on back to sitting on the side of the bed? [] 1   [x] 2   [] 3   [] 4 How much help from another person does the patient currently need. .. Total A Lot A Little None 4. Moving to and from a bed to a chair (including a wheelchair)? [] 1   [x] 2   [] 3   [] 4  
5. Need to walk in hospital room? [] 1   [x] 2   [] 3   [] 4  
6. Climbing 3-5 steps with a railing? [x] 1   [] 2   [] 3   [] 4  
© , Trustees of [address] [place] [address], under license to [employer]. All rights reserved Score:  Initial: 11 Most Recent: X (Date: -- ) Interpretation of Tool:  Represents activities that are increasingly more difficult (i.e. Bed mobility, Transfers, Gait). Medical Necessity:    
· Patient is expected to demonstrate progress in strength and functional technique to decrease assistance required with bed mobility, SPT and short distance gait with rollator. Reason for Services/Other Comments: 
· Patient continues to require skilled intervention due to medical complications. Use of outcome tool(s) and clinical judgement create a POC that gives a: Questionable prediction of patient's progress: MODERATE COMPLEXITY TREATMENT:  
(In addition to Assessment/Re-Assessment sessions the following treatments were rendered) Pre-treatment Symptoms/Complaints:  Patient had no complaints of pain but states he is tired and weak Pain: Initial:  
Pain Intensity 1: 4 Pain Location 1: Knee Pain Orientation 1: Right  Post Session:  4/10 PT initial assessment Therapeutic Exercise: ( 23 mins):  Exercises per grid below to improve mobility and strength. Required maximal visual and verbal cues to promote proper body alignment and promote proper body mechanics. Weak in the legs with exercise and mobility. Date: 
1/22/20 Date: 
 Date: Activity/Exercise Parameters Parameters Parameters Ankle pumps 2 x 10 Heel slides in supine 10 B Hip IR/ER in supine 10 B Seated knee extension 2 x 10 Braces/Orthotics/Lines/Etc:  
· O2 Device: Hi flow nasal cannula Treatment/Session Assessment:   
· Response to Treatment:  Patient participated well but is weak and limited secondary to size and weight · Interdisciplinary Collaboration:  
o Physical Therapist 
o Physical Therapy Assistant 
o Registered Nurse · After treatment position/precautions:  
o Up in chair - then assisted back to bed after sitting up for 2.5 hours. o Bed/Chair-wheels locked 
o Bed in low position 
o Call light within reach 
o RN notified · Compliance with Program/Exercises: Will assess as treatment progresses · Recommendations/Intent for next treatment session: \"Next visit will focus on advancements to more challenging activities and reduction in assistance provided\". Total Treatment Duration: PT Patient Time In/Time Out Time In: 3652 Time Out: [facility] [facility] [place]

## 2020-01-22 NOTE — PROGRESS NOTES
Medications given. BP up to 102/56 with a pulse of 77. Pt confused. Refusing his insulin this evening stating \"It won't hurt if my BS is a little high one time. \"  Encouraged to take. Pt continued to refuse. Pt sitting up in bed and eating his dinner at this time

## 2020-01-22 NOTE — PROGRESS NOTES
Hospitalist Note Admit Date:  2020  3:33 PM  
Name:  Manuela Strong Age:  70 y.o. 
:  1940 MRN:  [id number] PCP:  Lj Barnett MD 
Treatment Team: Attending Provider: Herminia Panda MD; Utilization Review: Abdirahman Mckenzie RN; Care Manager: Derek Adams; Staff Nurse: Saumya Rock, RN; Occupational Therapist: Maribel Baptiste, OTR/L 
 
HPI/Subjective:  
 
 
Mr. Kathie Dunn is a 71 yo male with PMH of DM2, AFIB on eliquis, obesity, HFpEF, falls admitted s/p fall with resultant right chest wall hematoma with associated anemia, hypotension and coagulopathy. He required PRBC. Surgery consulted and no intervention needed. He has developed pulmonary edema/ acute hypoxia requiring IV lasix. Cardiology has evaluated and is agreeable to stop eliquis going forward. ECHO ordered. Discharge plans pending to home vs rehab 
 
 
20 less short of breath, still on 10 L NC, eating ok, had BM, less urine output despite lasix, chest wall ok, has frequent falls, has chronic edema Objective:  
 
Patient Vitals for the past 24 hrs: 
 Temp Pulse Resp BP SpO2  
20 1545  (!) 105  (!) 75/41 99 % 20 1055 97.4 °F (36.3 °C) 80 18 136/79 97 % 20 0716 97.4 °F (36.3 °C) 87 20 142/80 97 % 20 0444 97.9 °F (36.6 °C) 80 20 (!) 149/92 99 % 20 2358 97.4 °F (36.3 °C) 76 20 152/83 96 % 20 2059 97.4 °F (36.3 °C) 77 20 110/64 98 % Oxygen Therapy O2 Sat (%): 99 % (20 1545) Pulse via Oximetry: 68 beats per minute (20 1431) O2 Device: Hi flow nasal cannula (20 09) O2 Flow Rate (L/min): 10 l/min (20 09) FIO2 (%): 60 % (20 0335) Estimated body mass index is 41.97 kg/m² as calculated from the following: 
  Height as of 19: 6' 3\" (1.905 m). Weight as of this encounter: 152.3 kg (335 lb 12.2 oz). Intake/Output Summary (Last 24 hours) at 2020 4921 Last data filed at 1/22/2020 1500 Gross per 24 hour Intake 470 ml Output 950 ml Net -480 ml *Note that automatically entered I/Os may not be accurate; dependent on patient compliance with collection and accurate  by [employer]. General:    Well nourished. Alert. No distress, obese CV:   RRR. No murmur, rub, or gallop. 1+ edema Lungs:   CTAB. No wheezing, rhonchi, or rales. Abdomen:   Soft, nontender, nondistended. obese Extremities: Warm and dry Skin:     Right chest wall bruising Neuro:  No gross focal deficits Data Review: 
I have reviewed all labs, meds, and studies from the last 24 hours: 
 
Recent Results (from the past 24 hour(s)) GLUCOSE, POC Collection Time: 01/21/20  4:35 PM  
Result Value Ref Range Glucose (POC) 152 (H) 65 - 100 mg/dL PLEASE READ & DOCUMENT PPD TEST IN 24 HRS Collection Time: 01/21/20  5:30 PM  
Result Value Ref Range PPD Negative Negative  
 mm Induration 0 0 - 5 mm GLUCOSE, POC Collection Time: 01/21/20  9:17 PM  
Result Value Ref Range Glucose (POC) 170 (H) 65 - 100 mg/dL POC G3 Collection Time: 01/22/20  4:36 AM  
Result Value Ref Range Device: High Flow Nasal Cannula    
 pH (POC) 7.342 (L) 7.35 - 7.45    
 pCO2 (POC) 46.6 (H) 35 - 45 MMHG  
 pO2 (POC) 126 (H) 75 - 100 MMHG  
 HCO3 (POC) 25.3 22 - 26 MMOL/L  
 sO2 (POC) 99 (H) 95 - 98 % Base deficit (POC) 1 mmol/L Allens test (POC) YES Site LEFT RADIAL Specimen type (POC) ARTERIAL Performed by Elaine   
 CO2, POC 27 MMOL/L Flow rate (POC) 10.000 L/min Respiratory comment: NurseNotified COLLECT TIME 429 URINALYSIS W/ RFLX MICROSCOPIC Collection Time: 01/22/20  5:02 AM  
Result Value Ref Range Color YELLOW Appearance CLEAR Specific gravity 1.013 1.001 - 1.023    
 pH (UA) 5.5 5.0 - 9.0 Protein 100 (A) NEG mg/dL Glucose NEGATIVE  mg/dL Ketone NEGATIVE  NEG mg/dL  Bilirubin NEGATIVE  NEG    
 Blood NEGATIVE  NEG Urobilinogen 1.0 0.2 - 1.0 EU/dL Nitrites NEGATIVE  NEG Leukocyte Esterase NEGATIVE  NEG    
 WBC 0-3 0 /hpf  
 RBC 0-3 0 /hpf Epithelial cells 0-3 0 /hpf Bacteria TRACE 0 /hpf Casts 5-10 0 /lpf  
CBC W/O DIFF Collection Time: 01/22/20  5:16 AM  
Result Value Ref Range WBC 14.4 (H) 4.3 - 11.1 K/uL  
 RBC 3.68 (L) 4.23 - 5.6 M/uL HGB 8.7 (L) 13.6 - 17.2 g/dL HCT 29.0 (L) 41.1 - 50.3 % MCV 78.8 (L) 79.6 - 97.8 FL  
 MCH 23.6 (L) 26.1 - 32.9 PG  
 MCHC 30.0 (L) 31.4 - 35.0 g/dL RDW 21.8 (H) 11.9 - 14.6 % PLATELET 844 627 - 071 K/uL MPV 11.3 9.4 - 12.3 FL ABSOLUTE NRBC 0.00 0.0 - 0.2 K/uL METABOLIC PANEL, COMPREHENSIVE Collection Time: 01/22/20  5:16 AM  
Result Value Ref Range Sodium 138 136 - 145 mmol/L Potassium 4.7 3.5 - 5.1 mmol/L Chloride 106 98 - 107 mmol/L  
 CO2 25 21 - 32 mmol/L Anion gap 7 7 - 16 mmol/L Glucose 117 (H) 65 - 100 mg/dL BUN 28 (H) 8 - 23 MG/DL Creatinine 1.39 0.8 - 1.5 MG/DL  
 GFR est AA >60 >60 ml/min/1.73m2 GFR est non-AA 53 (L) >60 ml/min/1.73m2 Calcium 8.6 8.3 - 10.4 MG/DL Bilirubin, total 1.1 0.2 - 1.1 MG/DL  
 ALT (SGPT) 11 (L) 12 - 65 U/L  
 AST (SGOT) 16 15 - 37 U/L Alk. phosphatase 37 (L) 50 - 136 U/L Protein, total 6.0 (L) 6.3 - 8.2 g/dL Albumin 1.8 (L) 3.2 - 4.6 g/dL Globulin 4.2 (H) 2.3 - 3.5 g/dL A-G Ratio 0.4 (L) 1.2 - 3.5 GLUCOSE, POC Collection Time: 01/22/20  9:16 AM  
Result Value Ref Range Glucose (POC) 151 (H) 65 - 100 mg/dL GLUCOSE, POC Collection Time: 01/22/20 12:00 PM  
Result Value Ref Range Glucose (POC) 148 (H) 65 - 100 mg/dL All Micro Results None Current Meds: 
Current Facility-Administered Medications Medication Dose Route Frequency  nystatin (MYCOSTATIN) 100,000 unit/gram powder   Topical BID  LORazepam (ATIVAN) tablet 1 mg  1 mg Oral Q6H PRN  
  0.9% sodium chloride infusion 250 mL  250 mL IntraVENous PRN  
 [Held by provider] furosemide (LASIX) injection 40 mg  40 mg IntraVENous Q12H  
 famotidine (PEPCID) tablet 20 mg  20 mg Oral BID  albuterol-ipratropium (DUO-NEB) 2.5 MG-0.5 MG/3 ML  3 mL Nebulization Q4H PRN  
 gabapentin (NEURONTIN) capsule 300 mg  300 mg Oral Q12H  
 HYDROcodone-acetaminophen (NORCO) 7.5-325 mg per tablet 1 Tab  1 Tab Oral Q6H PRN  
 insulin lispro (HUMALOG) injection   SubCUTAneous AC&HS  ondansetron (ZOFRAN) injection 4 mg  4 mg IntraVENous Q6H PRN  probenecid (BENEMID) tablet 500 mg  500 mg Oral Q12H  
 sotalol (BETAPACE) tablet 120 mg  120 mg Oral Q12H Other Studies: 
Results for orders placed or performed during the hospital encounter of 20  
2D ECHO COMPLETE ADULT (TTE) W OR WO CONTR Narrative [facility] [address] [place], [address] [facility] 
[phone] Transthoracic Echocardiogram 
2D, M-mode, Doppler, and Color Doppler Patient: Evertt Runner 
MR #: [id number] : 1940 Age: 70 years Gender: Male Study date: 2020 Account #: [de-identified] Height: 75 in 
Weight: 334.4 lb 
BSA: 2.73 mï¾² Status:Routine Location: 237 BP: 142/ 80 Allergies: CLINDAMYCIN, MEPERIDINE, LOSARTAN-HYDROCHLOROTHIAZIDE, PENICILLINS Sonographer:  Adelaida Galloway [facility] Group:  [facility] Card[facility] Referring Physician:  Preston Crump. Debora Paris MD 
Reading Physician:  Jaswant Albarado. Rinku Ty MD [facility] INDICATIONS: Pulmonary Edema PROCEDURE: This was a routine study. A transthoracic echocardiogram was 
performed. The study included complete 2D imaging, M-mode, complete spectral 
Doppler, and color Doppler. Intravenous contrast (Definity) was administered. Echocardiographic views were limited by restricted patient mobility and poor 
acoustic window availability. This was a technically difficult study. LEFT VENTRICLE: Size was normal. Systolic function was normal. Ejection 
fraction was estimated in the range of 20 % to 25 %. There was severe 
hypokinesis of the entire anterior, mid anteroseptal, mid inferoseptal,  
entire 
inferior, mid anterolateral, apical septal, apical lateral, and apical  
wall(s). There was mild concentric hypertrophy. The study was not technically  
sufficient 
to allow evaluation of LV diastolic function. RIGHT VENTRICLE: The size was normal. Systolic function was normal. A pacing 
wire was present. The tricuspid jet envelope definition was inadequate for 
estimation of RV systolic pressure. LEFT ATRIUM: The atrium was mildly dilated. RIGHT ATRIUM: Not well visualized. SYSTEMIC VEINS: IVC: The inferior vena cava was mildly dilated. The 
respirophasic change in diameter was less than 50%. AORTIC VALVE: The valve was probably trileaflet. There was no evidence for 
stenosis. There was no insufficiency. MITRAL VALVE: Valve structure was normal. There was no evidence for stenosis. There was no regurgitation. TRICUSPID VALVE: The valve structure was normal. There was no evidence for 
stenosis. There was mild regurgitation. PULMONIC VALVE: Not well visualized. There was no evidence for stenosis. There 
was trivial regurgitation. There was no insufficiency. PERICARDIUM: A possible, small pericardial effusion was identified posterior  
to 
the heart. AORTA: The root exhibited normal size. SUMMARY: 
 
-  Left ventricle: Systolic function was normal. Ejection fraction was 
estimated in the range of 20 % to 25 %. There was severe hypokinesis of the 
entire anterior, mid anteroseptal, mid inferoseptal, entire inferior, mid 
anterolateral, apical septal, apical lateral, and apical wall(s). There was 
mild concentric hypertrophy. -  Left atrium: The atrium was mildly dilated. -  Inferior vena cava, hepatic veins: The inferior vena cava was mildly dilated. The respirophasic change in diameter was less than 50%. -  Tricuspid valve: There was mild regurgitation. 
 
-  Pericardium: A possible, small pericardial effusion was identified  
posterior 
to the heart. SYSTEM MEASUREMENT TABLES 
 
2D mode AoR Diam (2D): 3.5 cm 
LA Dimension (2D): 4.7 cm IVS/LVPW (2D): 0.9 IVSd (2D): 1.3 cm LVIDd (2D): 4.4 cm LVIDs (2D): 3.1 cm 
LVOT Area (2D): 3.5 cm2 LVPWd (2D): 1.3 cm RVIDd (2D): 3.3 cm Unspecified Scan Mode LVOT Diam: 2.1 cm Prepared and signed by 
 
Kaylyn King. Elzbieta Dee MD [facility] Signed 22-Jan-2020 14:55:59 No results found. Assessment and Plan:  
 
Hospital Problems as of 1/22/2020 Date Reviewed: 7/15/2019 Codes Class Noted - Resolved POA * (Principal) Fall ICD-10-CM: W19. Fawn Vila ICD-9-CM: E888.9  1/17/2020 - Present Yes Hematoma of right chest wall ICD-10-CM: D97.688L ICD-9-CM: 922.1  1/17/2020 - Present Yes Coagulopathy ([address] from Eliquis adn Indocin use ICD-10-CM: D68.9 ICD-9-CM: 286.9  1/17/2020 - Present Yes Acute pain of right knee ICD-10-CM: M25.561 ICD-9-CM: 719.46  1/17/2020 - Present Yes Debility ICD-10-CM: R53.81 ICD-9-CM: 799.3  1/17/2020 - Present Yes Fluid overload ICD-10-CM: E87.70 ICD-9-CM: 276.69  1/17/2020 - Present Yes A-fib [facility]) ICD-10-CM: I48.91 
ICD-9-CM: 427.31  1/17/2020 - Present Unknown Hypotension ICD-10-CM: I95.9 ICD-9-CM: 458.9  1/17/2020 - Present Unknown Anticoagulated ICD-10-CM: Z79.01 
ICD-9-CM: V58.61  1/17/2020 - Present Unknown Chest wall hematoma ICD-10-CM: S20.219A 
ICD-9-CM: 922.1  1/17/2020 - Present Unknown Blood loss anemia ICD-10-CM: D50.0 ICD-9-CM: 280.0  1/17/2020 - Present Unknown Acute blood loss anemia ICD-10-CM: D62 
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 · Acute blood loss anemia with hypotension due to chest wall hematoma: followup CBC has since been stable post PRBC transfusion, surgery following without intervention needed · Acute hypoxia/ pulmonary edema/ volume overload/ acute diastolic CHF: hypotensive today, holding IV lasix , hooker placed with good urine output, weaning O2 as tolerant, checking ECHO · AFIB: off eliquis, continued sotalol · DM2: continued SSI, resume lantus · Hypotension: holding lasix, checking HGB now, asymptomatic and will followup BP closely · Leukocytosis: has chronic trend of elevated WBC, UA negative, CXR without pneumonia, followup daily CBC trends · Gout: on benemid DC planning/Dispo:  Pending, needs PT/OT Diet:  DIET CARDIAC 
DVT ppx:  SCD Signed: Milla Carreon MD

## 2020-01-22 NOTE — PROGRESS NOTES
Md notified that patient is having increased confusion and ABG's and any other lab work requested. Awaiting response. Patient refused to wear Bipap.

## 2020-01-23 NOTE — PROGRESS NOTES
This CM met with pt and spouse at bedside this day to discuss discharge planning. Plan remains that pt would like to transition to SNF for STR when stable. From list provided they have only picked [address] to have referral sent to. This CM encouraged additional 1-2 choices in case [address] is full. Pt and spouse continue to review. No additional discharge needs for CM at this time. Referral sent to [address]

## 2020-01-23 NOTE — PROGRESS NOTES
Problem: Patient Education: Go to Patient Education Activity Goal: Patient/Family Education Description 1. Patient will complete lower body bathing and dressing with min A and adaptive equipment as needed. 2. Patient will complete toileting and toilet transfer with min A.  
3. Patient will tolerate 23 minutes of OT treatment with 1-2 rest breaks to increase activity tolerance for ADLs. 4. Patient will complete functional transfers with mod A and adaptive equipment as needed. Timeframe: 7 visits Outcome: Progressing Towards Goal 
 
 
OCCUPATIONAL THERAPY: Daily Note and PM 1/23/2020 INPATIENT: OT Visit Days: 1 [place] / [place] B / Product Type: Medicare /  
  
NAME/AGE/GENDER: Dain Molina is a 70 y.o. male PRIMARY DIAGNOSIS:  Chest wall hematoma [S20.219A] Hypotension [I95.9] A-fib ([facility] [I48.91] Anticoagulated [Z79.01] Acute blood loss anemia [D62] Subcutaneous hematoma [T14. Nadeen Alpha Fall Fall ICD-10: Treatment Diagnosis:  
 · Generalized Muscle Weakness (M62.81) · Other lack of cordination (R27.8) · Difficulty in walking, Not elsewhere classified (R26.2) Precautions/Allergies: 
  Fall precautions Clindamycin; Demerol [meperidine]; Losartan-hydrochlorothiazide; and Pcn [penicillins] ASSESSMENT:  
 
Mr. Adrienne Bee presents for the above diagnoses. Upon arrival, pt supine in bed and agreeable to OT evaluation. Pt is alert and oriented x4. Currently on 6L02 via high flow nasal canula. Pt states that he has never requires supplemental 02 prior to hospitalization. Pt reports living with wife in a 1-story home with 2 steps to enter. At baseline, pt was independent with ADLs and MOD I for household mobility with use of rollator; uses motorized scooter for community level distances. Today, pt presents with deficits in overall strength, activity tolerance, ADLs and functional mobility.  Co-treatment completed with PT today d/t pt's increased need for assistance during ADLs, balance, and functional mobility. Pt required max A x 2 for supine to sit transfer to edge of bed. Static sitting balance is intact, however occasional support and min verbal and tactile cueing required during dynamic unsupported balance. Cueing also required in order to maintain upright head posture. Pt required max A x 2 for sit to stand and ~3 steps to bedside chair. Pt left sitting upright in bedside chair with needs met, call light within reach, and RN notified. At this time, Jagdish Mata is functioning below baseline for ADLs and functional mobility. Pt would benefit from skilled OT services to address OT goals and plan of care. PM NOTE: 
OT & PT returned this p.m. for transfer from chair to bed. Upon arrival, pt required use of BSC for bowel movement. Pt required max A x 2 for sit to stand from chair. Upon standing, static and dynamic standing balance is poor with forward trunk flexion noted. Constant verbal cueing required to maintain upright posture. Able to ambulate ~3 steps to [facility] with max a x 2 for toilet transfer. Required total A for bowel hygiene. Pt stood once again with max A x 2 and bed pulled up behind pt. Pt returned back to supine in bed with max A x 2. Pt left with needs met, call light within reach, and PCT at bedside. Co-treatment completed with PT d/t pt's increased need for assistance during ADLs and functional mobility. Minimal progress towards goals. Will continue to address OT goals and plan of care. This section established at most recent assessment PROBLEM LIST (Impairments causing functional limitations): 1. Decreased Strength 2. Decreased ADL/Functional Activities 3. Decreased Transfer Abilities 4. Decreased Ambulation Ability/Technique 5. Decreased Balance 6. Increased Pain 7. Decreased Activity Tolerance  INTERVENTIONS PLANNED: (Benefits and precautions of occupational therapy have been discussed with the patient.) 1. Activities of daily living training 2. Adaptive equipment training 3. Balance training 4. Clothing management 5. Community reintergration 6. Donning&doffing training 7. Neuromuscular re-eduation 8. Re-evaluation 9. Therapeutic activity 10. Therapeutic exercise TREATMENT PLAN: Frequency/Duration: Follow patient 3x/week to address above goals. Rehabilitation Potential For Stated Goals: Good REHAB RECOMMENDATIONS (at time of discharge pending progress):   
Placement: It is my opinion, based on this patient's performance to date, that Mr. Roddy Clancy may benefit from intensive therapy at a [address] [place] [address] after discharge due to the functional deficits listed above that are likely to improve with skilled rehabilitation and concerns that he/she may be unsafe to be unsupervised at home due to decline in ability to safely perform ADLs and functional mobility. . 
Equipment: ? TBD   
    
 
 
 
OCCUPATIONAL PROFILE AND HISTORY:  
History of Present Injury/Illness (Reason for Referral): 
See H&P Past Medical History/Comorbidities:  
Mr. Roddy Clancy  has a past medical history of Arrhythmia, Arthritis, CAD (coronary artery disease), Cardiac pacemaker (11/12/2015), Chronic diastolic heart failure ([address] (11/12/2015), Chronic pain, Diabetes ([address] Diabetes mellitus type II, uncontrolled ([address] Dyspnea/shortness of breath (11/12/2015), GERD (gastroesophageal reflux disease), Heart failure ([address] HTN - uncontrolled, malignant (8/8/2016), Hyperlipidemia (11/12/2015), Hypertension, Malaise/fatigue/weakness/tiredness (11/12/2015), Mixed hyperlipidemia, Morbid obesity ([address] Orthostatic hypotension (11/12/2015), Other ill-defined conditions(799.89), Paroxysmal atrial fibrillation ([address] Permanent atrial fibrillation ([address] (11/12/2015), Tendon injury, and Unspecified sleep apnea.   Mr. Roddy Clancy  has a past surgical history that includes hx other surgical; hx hernia repair; pr left heart cath,percutaneous (3/31/2014); and hx pacemaker. Social History/Living Environment:  
Home Environment: Private residence # Steps to Enter: 1 One/Two Story Residence: One story Living Alone: No 
Support Systems: Spouse/Significant Other/Partner Patient Expects to be Discharged to[de-identified] Unknown Current DME Used/Available at Home: [employer], rollator, Commode, bedside Prior Level of Function/Work/Activity: 
Independent with ADLs and MOD I for household mobility with use of rollator; uses motorized scooter for community level distances Personal Factors:   
      Sex:  male Age:  70 y.o. Other factors that influence how disability is experienced by the patient:  multiple co-morbidities Number of Personal Factors/Comorbidities that affect the Plan of Care: Brief history (0):  LOW COMPLEXITY ASSESSMENT OF OCCUPATIONAL PERFORMANCE[de-identified]  
Activities of Daily Living:  
Basic ADLs (From Assessment) Complex ADLs (From Assessment) Feeding: Setup Oral Facial Hygiene/Grooming: Setup Bathing: Maximum assistance Upper Body Dressing: Minimum assistance Lower Body Dressing: Maximum assistance Toileting: Maximum assistance Instrumental ADL Meal Preparation: Total assistance Homemaking: Total assistance Grooming/Bathing/Dressing Activities of Daily Living Cognitive Retraining Safety/Judgement: Fall prevention;Home safety Bed/Mat Mobility Supine to Sit: Maximum assistance;Assist x2 Sit to Stand: Assist x2; Moderate assistance;Maximum assistance Stand to Sit: Moderate assistance;Assist x2 Bed to Chair: Maximum assistance;Assist x2 Scooting: Maximum assistance;Assist x2 Most Recent Physical Functioning:  
Gross Assessment: 
AROM: Generally decreased, functional 
PROM: Generally decreased, functional 
Strength: Within functional limits Coordination: Generally decreased, functional 
Tone: Normal 
 Sensation: Intact Posture: 
  
Balance: 
Sitting: Impaired Sitting - Static: Good (unsupported) Sitting - Dynamic: Fair (occasional) Standing: Impaired Standing - Static: Fair;Constant support Standing - Dynamic : Fair Bed Mobility: 
Supine to Sit: Maximum assistance;Assist x2 Scooting: Maximum assistance;Assist x2 Wheelchair Mobility: 
  
Transfers: 
Sit to Stand: Assist x2; Moderate assistance;Maximum assistance Stand to Sit: Moderate assistance;Assist x2 Bed to Chair: Maximum assistance;Assist x2 Patient Vitals for the past 6 hrs: 
 BP SpO2 Pulse 20 1105 110/72 94 % 79 Mental Status Neurologic State: Alert, Confused Orientation Level: Disoriented to person, Disoriented to place Cognition: Follows commands Perception: Appears intact Perseveration: Perseverates during conversation Safety/Judgement: Fall prevention, Home safety Physical Skills Involved: 
1. Balance 2. Strength 3. Activity Tolerance 4. Gross Motor Control Cognitive Skills Affected (resulting in the inability to perform in a timely and safe manner): 1. none  Psychosocial Skills Affected: 1. Habits/Routines 2. Environmental Adaptation Number of elements that affect the Plan of Care: 5+:  HIGH COMPLEXITY CLINICAL DECISION MAKIN [facility] [address] [id number] AM-PAC 6 Clicks Daily Activity Inpatient Short Form How much help from another person does the patient currently need. .. Total A Lot A Little None 1. Putting on and taking off regular lower body clothing? [] 1   [x] 2   [] 3   [] 4  
2. Bathing (including washing, rinsing, drying)? [] 1   [x] 2   [] 3   [] 4  
3. Toileting, which includes using toilet, bedpan or urinal?   [] 1   [x] 2   [] 3   [] 4  
4. Putting on and taking off regular upper body clothing? [] 1   [] 2   [x] 3   [] 4  
5. Taking care of personal grooming such as brushing teeth?    [] 1   [] 2   [x] 3   [] 4  
 6.  Eating meals? [] 1   [] 2   [x] 3   [] 4  
© 2007, Trustees of [address] [place] [address] [address] [id number], under license to [employer]. All rights reserved Score:  Initial: 15 Most Recent: X (Date: -- ) Interpretation of Tool:  Represents activities that are increasingly more difficult (i.e. Bed mobility, Transfers, Gait). Medical Necessity:    
· Patient demonstrates · good ·  rehab potential due to higher previous functional level. Reason for Services/Other Comments: 
· Patient continues to require skilled intervention due to · medical complications and patient unable to attend/participate in therapy as expected · . Use of outcome tool(s) and clinical judgement create a POC that gives a: LOW COMPLEXITY  
 
 
 
TREATMENT:  
(In addition to Assessment/Re-Assessment sessions the following treatments were rendered) Pre-treatment Symptoms/Complaints:  \"I want to get out of bed. \" 
Pain: Initial:  
Pain Intensity 1: 0 Pain Location 1: Knee Pain Orientation 1: Right/10 Post Session:  same Neuromuscular Re-education: ( 10 minutes):  Exercise/activities per grid below to improve balance, coordination, and posture. Required max visual, verbal, and tactile cues to promote static and dynamic balance in sitting and standing. Address static and dynamic standing balance. Significant forward trunk flexion noted with constant verbal cueing required to maintain upright posture. Self Care: (13 minutes): Procedure(s) (per grid) utilized to improve and/or restore self-care/home management as related to toileting. Required minimal verbal and   cueing to facilitate activities of daily living skills. Required max A x 2 for toilet transfer to [facility]. Required total A for bowel hygiene. Braces/Orthotics/Lines/Etc:  
· O2 Device: Hi flow nasal cannula Treatment/Session Assessment:   
· Response to Treatment:  tolerated well with no issues noted.   
· Interdisciplinary Collaboration:  
o Physical Therapist 
 o Occupational Therapist 
o Registered Nurse · After treatment position/precautions:  
o Supine in bed 
o Bed/Chair-wheels locked 
o Bed in low position 
o Call light within reach 
o PCT at bedside. · Compliance with Program/Exercises: Will assess as treatment progresses. · Recommendations/Intent for next treatment session: \"Next visit will focus on advancements to more challenging activities and reduction in assistance provided\". Total Treatment Duration: OT Patient Time In/Time Out Time In: 3217 Time Out: 1401 Elly Flowers, OT

## 2020-01-23 NOTE — PROGRESS NOTES
. 
END OF SHIFT NOTE: 
 
INTAKE/OUTPUT 
01/22 0701 - 01/23 0700 In: 470 [P.O.:470] Out: 1200 [Urine:1200] Voiding: YES Catheter: YES Drain:   
 
 
 
 
 
Flatus: Patient does not have flatus present. Emesis: 0 occurrences. Characteristics: VITAL SIGNS Patient Vitals for the past 12 hrs: 
 Temp Pulse Resp BP SpO2  
01/23/20 0328 98.2 °F (36.8 °C) 77 18 95/60 99 % 01/23/20 0204  80 19 92/50 94 % 01/22/20 2346 98.2 °F (36.8 °C) 99 18 (!) 82/45 100 % 01/22/20 2052     97 % 01/22/20 1903 98.9 °F (37.2 °C) 78 18 122/70 98 % Pain Assessment Pain Intensity 1: 0 (01/23/20 0223) Pain Location 1: Knee Pain Intervention(s) 1: Medication (see MAR) Patient Stated Pain Goal: 0 Ambulating No 
 
Shift report given to oncoming nurse at the bedside.  
 
Yokasta Torre RN

## 2020-01-23 NOTE — PROGRESS NOTES
Problem: Mobility Impaired (Adult and Pediatric) Goal: *Acute Goals and Plan of Care (Insert Text) Description Goals: 
(1.)Mr. Leandra Chang will move from supine to sit and sit to supine , scoot up and down and roll side to side with CONTACT GUARD ASSIST within 5 treatment day(s). (2.)Mr. Leandra Chang will transfer from bed to chair and chair to bed with MINIMAL ASSIST using the least restrictive device within 5 treatment day(s). (3.)Mr. Leandra Chang will ambulate with MINIMAL ASSIST for 25-50 feet with the least restrictive device within 5 treatment day(s). PHYSICAL THERAPY: Daily Note and AM 1/23/2020 INPATIENT:  visit day 1 [place] / Product Type: Medicare /   
  
NAME/AGE/GENDER: Johana Nicholson is a 70 y.o. male PRIMARY DIAGNOSIS: Chest wall hematoma [S20.219A] Hypotension [I95.9] A-fib ([address] [I48.91] Anticoagulated [Z79.01] Acute blood loss anemia [D62] Subcutaneous hematoma [T14. [place] Fall Fall ICD-10: Treatment Diagnosis:  
 · Generalized Muscle Weakness (M62.81) · Difficulty in walking, Not elsewhere classified (R26.2) · Repeated Falls (R29.6) Precaution/Allergies: 
Clindamycin; Demerol [meperidine]; Losartan-hydrochlorothiazide; and Pcn [penicillins] ASSESSMENT:  
 
Patient presents in supine with 1 leg off the bed stating he has been trying to get up out of the bed. He had his O2 out of his nose and stated \"that makes my nose hurt\"  His O2 sats were at 83% without air. His O2 was positioned back on his nose at 6L of hi-flow and his sats slowly returned to 92% by the end of the treatment. PT and OT present for co-treat in order to manage size, weight of the patient for safety and work on more functional activities during the treatment. He required max assist x 2 for supine to sit. Sitting balance on the EOB was good for static sitting with verbal cues at times to bend forward.   He continues to have poor head upright posture and needs cues or manual assist to keep it more upright. Sit to stand required moderate assist x 2 with improved pushing from the patient with his legs and quicker upright standing posture using the r/walker for UE support. He was then able to step ~ 3 steps with the r/walker with moderate assist x 2 to transition over to the bedside chair. He was seated for comfort and then worked on BLE AROM exercises in sitting. He was left sitting in the chair with the call and his personal items within reach. His bed was not working and this was reported to the nurse/unit secretary. He was kind and stated that it was a little easier for him today with standing and transitioning. He remains very talkative during the treatment. This section established at most recent assessment PROBLEM LIST (Impairments causing functional limitations): 1. Decreased Strength 2. Decreased ADL/Functional Activities 3. Decreased Transfer Abilities 4. Decreased Ambulation Ability/Technique 5. Decreased Activity Tolerance INTERVENTIONS PLANNED: (Benefits and precautions of physical therapy have been discussed with the patient.) 1. Bed Mobility 2. Therapeutic Activites 3. Therapeutic Exercise/Strengthening 4. Transfer Training TREATMENT PLAN: Frequency/Duration: 3 times a week for duration of hospital stay Rehabilitation Potential For Stated Goals: Fair REHAB RECOMMENDATIONS (at time of discharge pending progress):   
Placement: It is my opinion, based on this patient's performance to date, that Mr. Beatris Jones may benefit from intensive therapy at a [address] [place] [address] after discharge due to the functional deficits listed above that are likely to improve with skilled rehabilitation and concerns that he/she may be unsafe to be unsupervised at home due to weakness and history of falls. Equipment:  
? None at this time HISTORY:  
 History of Present Injury/Illness (Reason for Referral): Jagdish Mata is a 70 y.o. male who on Eliquis for afib who came to the ER on 1/17/20 after a fall 1-2 hrs prior to arrival. 
He also takes Indocin He had swelling and hematoma of the right upper anterior chest 
He fell from a standing position while using a Rollator and landed on the device and hit is right anterior chest.  
He developed progressive severe swelling and hematoma He is on Eliquis for atrial fibrillation.  Over the past 2 hours since the fall the hematoma has expanded to a small melon. Very painful and firm. No SOB. Denies any hip or back pain. Also has associated right knee pain No LOC or head trauma Past Medical History/Comorbidities:  
Mr. Beatris Jones  has a past medical history of Arrhythmia, Arthritis, CAD (coronary artery disease), Cardiac pacemaker (11/12/2015), Chronic diastolic heart failure ([address] (11/12/2015), Chronic pain, Diabetes ([address] Diabetes mellitus type II, uncontrolled ([address] Dyspnea/shortness of breath (11/12/2015), GERD (gastroesophageal reflux disease), Heart failure ([address] HTN - uncontrolled, malignant (8/8/2016), Hyperlipidemia (11/12/2015), Hypertension, Malaise/fatigue/weakness/tiredness (11/12/2015), Mixed hyperlipidemia, Morbid obesity ([address] Orthostatic hypotension (11/12/2015), Other ill-defined conditions(799.89), Paroxysmal atrial fibrillation ([address] Permanent atrial fibrillation ([address] (11/12/2015), Tendon injury, and Unspecified sleep apnea. Mr. Beatris Jones  has a past surgical history that includes hx other surgical; hx hernia repair; pr left heart cath,percutaneous (3/31/2014); and hx pacemaker. Social History/Living Environment:  
Home Environment: Private residence # Steps to Enter: 1 One/Two Story Residence: One story Living Alone: No 
Support Systems: Spouse/Significant Other/Partner Patient Expects to be Discharged to[de-identified] Unknown Current DME Used/Available at Home: [employer], rollator, Commode, bedside Prior Level of Function/Work/Activity: 
Patient reports he has been ambulating with a rollator at home and uses his power scooter but her also reports several falls. Number of Personal Factors/Comorbidities that affect the Plan of Care: 3+: HIGH COMPLEXITY EXAMINATION:  
Most Recent Physical Functioning:  
Gross Assessment: 
AROM: Generally decreased, functional 
PROM: Generally decreased, functional 
Strength: Generally decreased, functional 
Coordination: Grossly decreased, non-functional 
Tone: Normal 
Sensation: Intact Posture: 
  
Balance: 
Sitting: Impaired Sitting - Static: Good (unsupported) Standing: Impaired Standing - Static: Fair;Constant support Standing - Dynamic : Fair;Poor Bed Mobility: 
Supine to Sit:Maximum assistance;Assist x2 Scooting: Maximum assistance;Assist x2 Wheelchair Mobility: 
  
Transfers: 
Sit to Stand: Maximum assistance;Assist x2 Stand to Sit: Moderate assistance;Assist x2 Bed to Chair: Maximum assistance;Assist x2 Gait: 
  
Base of Support: Widened Speed/Edith: Slow;Shuffled Step Length: Left shortened;Right shortened Gait Abnormalities: Decreased step clearance Distance (ft): 6 Feet (ft) Assistive Device: Walker, rolling Ambulation - Level of Assistance: Moderate assistance;Assist x2 Body Structures Involved: 1. Muscles Body Functions Affected: 1. Neuromusculoskeletal 
2. Movement Related Activities and Participation Affected: 1. General Tasks and Demands 2. Mobility 3. Self Care Number of elements that affect the Plan of Care: 3: MODERATE COMPLEXITY CLINICAL PRESENTATION:  
Presentation: Evolving clinical presentation with changing clinical characteristics: MODERATE COMPLEXITY CLINICAL DECISION MAKIN \[facility]\"" [address] [id number] AM-PAC 6 Clicks Basic Mobility Inpatient Short Form How much difficulty does the patient currently have. ..  Unable A Lot A Little None 1. Turning over in bed (including adjusting bedclothes, sheets and blankets)? [] 1   [x] 2   [] 3   [] 4  
2. Sitting down on and standing up from a chair with arms ( e.g., wheelchair, bedside commode, etc.)   [] 1   [x] 2   [] 3   [] 4  
3. Moving from lying on back to sitting on the side of the bed? [] 1   [x] 2   [] 3   [] 4 How much help from another person does the patient currently need. .. Total A Lot A Little None 4. Moving to and from a bed to a chair (including a wheelchair)? [] 1   [x] 2   [] 3   [] 4  
5. Need to walk in hospital room? [] 1   [x] 2   [] 3   [] 4  
6. Climbing 3-5 steps with a railing? [x] 1   [] 2   [] 3   [] 4  
© 2007, Trustees of [facility] [employer], under license to [employer]. All rights reserved Score:  Initial: 11 Most Recent: X (Date: -- ) Interpretation of Tool:  Represents activities that are increasingly more difficult (i.e. Bed mobility, Transfers, Gait). Medical Necessity:    
· Patient is expected to demonstrate progress in strength and functional technique to decrease assistance required with bed mobility, SPT and short distance gait with rollator. Reason for Services/Other Comments: 
· Patient continues to require skilled intervention due to medical complications. Use of outcome tool(s) and clinical judgement create a POC that gives a: Questionable prediction of patient's progress: MODERATE COMPLEXITY  
  
 
 
 
TREATMENT:  
(In addition to Assessment/Re-Assessment sessions the following treatments were rendered) Pre-treatment Symptoms/Complaints:  Patient states he wanted to get up out of the bed. Pain: Initial:  
Pain Intensity 1: 1 Pain Location 1: Knee Pain Orientation 1: Right  Post Session:  0/10 Therapeutic Activity: (   23 mins): Therapeutic activities including Bed transfers, Chair transfers and sitting, sit to stand and standing to improve mobility and strength.   Required moderate assist/cues to promote static and dynamic balance in standing. Date: 
1/22/20 Date: 
1/23/20 Date: Activity/Exercise Parameters Parameters Parameters Ankle pumps 2 x 10 2 x 10 Heel slides in supine 10 B 2 x 10 Hip IR/ER in supine 10 B Seated knee extension 2 x 10 2 x 10 Seated marching hip flexion  2 x 10 Braces/Orthotics/Lines/Etc:  
· O2 Device: Hi flow nasal cannula 6L hi-flow Treatment/Session Assessment:   
· Response to Treatment:  Patient participated and did slightly better today with sit to stand. More confident and could hold standing position longer · Interdisciplinary Collaboration:  
o Physical Therapist 
o Registered Nurse 
o Certified Nursing Assistant/Patient Care Technician · After treatment position/precautions:  
o Up in chair  
o Bed/Chair-wheels locked 
o Bed in low position 
o Call light within reach 
o RN notified · Compliance with Program/Exercises: Will assess as treatment progresses · Recommendations/Intent for next treatment session: \"Next visit will focus on advancements to more challenging activities and reduction in assistance provided\". Total Treatment Duration: PT Patient Time In/Time Out Time In: 1008 Time Out: [address] [place], [place]

## 2020-01-23 NOTE — PROGRESS NOTES
Problem: Patient Education: Go to Patient Education Activity Goal: Patient/Family Education Description 1. Patient will complete lower body bathing and dressing with min A and adaptive equipment as needed. 2. Patient will complete toileting and toilet transfer with min A.  
3. Patient will tolerate 23 minutes of OT treatment with 1-2 rest breaks to increase activity tolerance for ADLs. 4. Patient will complete functional transfers with mod A and adaptive equipment as needed. Timeframe: 7 visits Outcome: Progressing Towards Goal 
 
 
OCCUPATIONAL THERAPY: Initial Assessment, Daily Note, and AM 1/23/2020 INPATIENT:   
[place] / Product Type: Medicare /  
  
NAME/AGE/GENDER: Cornina Vanegas is a 70 y.o. male PRIMARY DIAGNOSIS:  Chest wall hematoma [S20.219A] Hypotension [I95.9] A-fib ([facility] [I48.91] Anticoagulated [Z79.01] Acute blood loss anemia [D62] Subcutaneous hematoma [T14. R[place] Horse Fall Fall ICD-10: Treatment Diagnosis:  
 Generalized Muscle Weakness (M62.81) Other lack of cordination (R27.8) Difficulty in walking, Not elsewhere classified (R26.2) Precautions/Allergies: 
  Fall precautions Clindamycin; Demerol [meperidine]; Losartan-hydrochlorothiazide; and Pcn [penicillins] ASSESSMENT:  
 
Mr. Merna Lombardo presents for the above diagnoses. Upon arrival, pt supine in bed and agreeable to OT evaluation. Pt is alert and oriented x4. Currently on 6L02 via high flow nasal canula. Pt states that he has never requires supplemental 02 prior to hospitalization. Pt reports living with wife in a 1-story home with 2 steps to enter. At baseline, pt was independent with ADLs and MOD I for household mobility with use of rollator; uses motorized scooter for community level distances. Today, pt presents with deficits in overall strength, activity tolerance, ADLs and functional mobility.  Co-treatment completed with PT today d/t pt's increased need for assistance during ADLs, balance, and functional mobility. Pt required max A x 2 for supine to sit transfer to edge of bed. Static sitting balance is intact, however occasional support and min verbal and tactile cueing required during dynamic unsupported balance. Cueing also required in order to maintain upright head posture. Pt required max A x 2 for sit to stand and ~3 steps to bedside chair. Pt left sitting upright in bedside chair with needs met, call light within reach, and RN notified. At this time, Daphine Rios is functioning below baseline for ADLs and functional mobility. Pt would benefit from skilled OT services to address OT goals and plan of care. This section established at most recent assessment PROBLEM LIST (Impairments causing functional limitations): 
Decreased Strength Decreased ADL/Functional Activities Decreased Transfer Abilities Decreased Ambulation Ability/Technique Decreased Balance Increased Pain Decreased Activity Tolerance INTERVENTIONS PLANNED: (Benefits and precautions of occupational therapy have been discussed with the patient.) Activities of daily living training Adaptive equipment training Balance training Clothing management Community reintergration Donning&doffing training Neuromuscular re-eduation Re-evaluation Therapeutic activity Therapeutic exercise TREATMENT PLAN: Frequency/Duration: Follow patient 3x/week to address above goals. Rehabilitation Potential For Stated Goals: Good REHAB RECOMMENDATIONS (at time of discharge pending progress):   
Placement: It is my opinion, based on this patient's performance to date, that Mr. David Worley may benefit from intensive therapy at a [address] [place] [address] after discharge due to the functional deficits listed above that are likely to improve with skilled rehabilitation and concerns that he/she may be unsafe to be unsupervised at home due to decline in ability to safely perform ADLs and functional mobility. . 
Equipment: TBD OCCUPATIONAL PROFILE AND HISTORY:  
History of Present Injury/Illness (Reason for Referral): 
See H&P Past Medical History/Comorbidities:  
Mr. Leandra Chang  has a past medical history of Arrhythmia, Arthritis, CAD (coronary artery disease), Cardiac pacemaker (11/12/2015), Chronic diastolic heart failure ([address] (11/12/2015), Chronic pain, Diabetes ([address] Diabetes mellitus type II, uncontrolled ([address] Dyspnea/shortness of breath (11/12/2015), GERD (gastroesophageal reflux disease), Heart failure ([address] HTN - uncontrolled, malignant (8/8/2016), Hyperlipidemia (11/12/2015), Hypertension, Malaise/fatigue/weakness/tiredness (11/12/2015), Mixed hyperlipidemia, Morbid obesity ([address] Orthostatic hypotension (11/12/2015), Other ill-defined conditions(799.89), Paroxysmal atrial fibrillation ([address] Permanent atrial fibrillation ([address] (11/12/2015), Tendon injury, and Unspecified sleep apnea. Mr. Leandra Chang  has a past surgical history that includes hx other surgical; hx hernia repair; pr left heart cath,percutaneous (3/31/2014); and hx pacemaker. Social History/Living Environment:  
Home Environment: Private residence # Steps to Enter: 1 One/Two Story Residence: One story Living Alone: No 
Support Systems: Spouse/Significant Other/Partner Patient Expects to be Discharged to[de-identified] Unknown Current DME Used/Available at Home: [employer], rollator, Commode, bedside Prior Level of Function/Work/Activity: 
Independent with ADLs and MOD I for household mobility with use of rollator; uses motorized scooter for community level distances Personal Factors:   
      Sex:  male Age:  70 y.o. Other factors that influence how disability is experienced by the patient:  multiple co-morbidities Number of Personal Factors/Comorbidities that affect the Plan of Care: Brief history (0):  LOW COMPLEXITY ASSESSMENT OF OCCUPATIONAL PERFORMANCE[de-identified]  
 Activities of Daily Living:  
Basic ADLs (From Assessment) Complex ADLs (From Assessment) Feeding: Setup Oral Facial Hygiene/Grooming: Setup Bathing: Maximum assistance Upper Body Dressing: Minimum assistance Lower Body Dressing: Maximum assistance Toileting: Maximum assistance Instrumental ADL Meal Preparation: Total assistance Homemaking: Total assistance Grooming/Bathing/Dressing Activities of Daily Living Cognitive Retraining Safety/Judgement: Fall prevention;Home safety Bed/Mat Mobility Supine to Sit: Moderate assistance;Assist x2 Sit to Stand: Maximum assistance;Assist x2 Stand to Sit: Moderate assistance;Assist x2 Bed to Chair: Maximum assistance;Assist x2 Scooting: Maximum assistance;Assist x2 Most Recent Physical Functioning:  
Gross Assessment: 
AROM: Generally decreased, functional 
PROM: Generally decreased, functional 
Strength: Within functional limits Coordination: Generally decreased, functional 
Tone: Normal 
Sensation: Intact Posture: 
  
Balance: 
Sitting: Impaired Sitting - Static: Good (unsupported) Sitting - Dynamic: Fair (occasional) Standing: Impaired Standing - Static: Fair Standing - Dynamic : Fair;Poor Bed Mobility: 
Supine to Sit: Moderate assistance;Assist x2 Scooting: Maximum assistance;Assist x2 Wheelchair Mobility: 
  
Transfers: 
Sit to Stand: Maximum assistance;Assist x2 Stand to Sit: Moderate assistance;Assist x2 Bed to Chair: Maximum assistance;Assist x2 Patient Vitals for the past 6 hrs: 
 BP SpO2 Pulse 01/23/20 0737 111/77 95 % 82 Mental Status Neurologic State: Alert, Confused Orientation Level: Disoriented to person, Disoriented to place Cognition: Follows commands Perception: Appears intact Perseveration: Perseverates during conversation Safety/Judgement: Fall prevention, Home safety Physical Skills Involved: 
Balance Strength Activity Tolerance Gross Motor Control Cognitive Skills Affected (resulting in the inability to perform in a timely and safe manner): 
none  Psychosocial Skills Affected: 
Habits/Routines Environmental Adaptation Number of elements that affect the Plan of Care: 5+:  HIGH COMPLEXITY CLINICAL DECISION MAKING:  
[employer] AM-PAC 6 Clicks Daily Activity Inpatient Short Form How much help from another person does the patient currently need. .. Total A Lot A Little None 1. Putting on and taking off regular lower body clothing? [] 1   [x] 2   [] 3   [] 4  
2. Bathing (including washing, rinsing, drying)? [] 1   [x] 2   [] 3   [] 4  
3. Toileting, which includes using toilet, bedpan or urinal?   [] 1   [x] 2   [] 3   [] 4  
4. Putting on and taking off regular upper body clothing? [] 1   [] 2   [x] 3   [] 4  
5. Taking care of personal grooming such as brushing teeth? [] 1   [] 2   [x] 3   [] 4  
6. Eating meals? [] 1   [] 2   [x] 3   [] 4  
© 2007, Trustees of [facility] [employer], under license to [employer]. All rights reserved Score:  Initial: 15 Most Recent: X (Date: -- ) Interpretation of Tool:  Represents activities that are increasingly more difficult (i.e. Bed mobility, Transfers, Gait). Medical Necessity:    
Patient demonstrates  
good 
 rehab potential due to higher previous functional level. Reason for Services/Other Comments: 
Patient continues to require skilled intervention due to  
medical complications and patient unable to attend/participate in therapy as expected Gaby Gan Use of outcome tool(s) and clinical judgement create a POC that gives a: LOW COMPLEXITY  
 
 
 
TREATMENT:  
(In addition to Assessment/Re-Assessment sessions the following treatments were rendered) Pre-treatment Symptoms/Complaints:  \"I want to get out of bed. \" 
Pain: Initial:  
Pain Intensity 1: 0/10 Post Session:  same Neuromuscular Re-education: ( ):  Exercise/activities per grid below to improve balance, coordination, and posture. Required minimal visual, verbal, and tactile cues to promote static and dynamic balance in sitting and standing. Address dynamic unsupported sitting balance edge of bed with emphasis on upright posture and head/neck positioning. Braces/Orthotics/Lines/Etc:  
O2 Device: Hi flow nasal cannula Treatment/Session Assessment:   
Response to Treatment:  tolerated well with no issues noted. Interdisciplinary Collaboration:  
Physical Therapist 
Occupational Therapist 
Registered Nurse After treatment position/precautions:  
Up in chair Bed/Chair-wheels locked Call light within reach RN notified Compliance with Program/Exercises: Will assess as treatment progresses. Recommendations/Intent for next treatment session: \"Next visit will focus on advancements to more challenging activities and reduction in assistance provided\". Total Treatment Duration: OT Patient Time In/Time Out Time In: 1008 Time Out: 1031 Lalita Gomez OT

## 2020-01-23 NOTE — PROGRESS NOTES
Patient alert with periodic confusion. Patient refused insulin at bedtime. Encouraged to take insulin but patient still refused. VS 95/60, 77, 18, O2 @ 99%.

## 2020-01-23 NOTE — PROGRESS NOTES
Problem: Mobility Impaired (Adult and Pediatric) Goal: *Acute Goals and Plan of Care (Insert Text) Description Goals: 
(1.)Mr. Toño Jerry will move from supine to sit and sit to supine , scoot up and down and roll side to side with CONTACT GUARD ASSIST within 5 treatment day(s). (2.)Mr. Toño Jerry will transfer from bed to chair and chair to bed with MINIMAL ASSIST using the least restrictive device within 5 treatment day(s). (3.)Mr. Toño Jerry will ambulate with MINIMAL ASSIST for 25-50 feet with the least restrictive device within 5 treatment day(s). PHYSICAL THERAPY: Daily Note and AM 1/23/2020 INPATIENT: PT Visit Days : 2 [place] / [place] / Product Type: Medicare /   
  
NAME/AGE/GENDER: Polly Ambriz is a 70 y.o. male PRIMARY DIAGNOSIS: Chest wall hematoma [S20.219A] Hypotension [I95.9] A-fib ([facility] [address] [I48.91] Anticoagulated [Z79.01] Acute blood loss anemia [D62] Subcutaneous hematoma [T14. Maday Javon Fall Fall ICD-10: Treatment Diagnosis:  
 · Generalized Muscle Weakness (M62.81) · Difficulty in walking, Not elsewhere classified (R26.2) · Repeated Falls (R29.6) Precaution/Allergies: 
Clindamycin; Demerol [meperidine]; Losartan-hydrochlorothiazide; and Pcn [penicillins] ASSESSMENT:  
 
Patient presents sitting in the bedside chair stating he feels he need to use the bedside commode. PT/OT and CNA present to assist with wife at the bedside as well. Patient warmed-up his BLE with some basic ROM exercises prior to standing from the chair. He required mod/max assist x 2 for sit to stand and was able to achieve upright standing on his 2nd attempt using the r/walker for UE support. He stood and was able to take 2-3 steps with mod assist x 2 over to the bedside commode. He sat and was able to have a BM. He stood a 2nd time with mod assist x 2 and was able to maintain standing for hygiene by the OT.   He sat, rested then stood a 3rd time to allow the bed to be moved up under him for sitting and return to supine. He required max assist x 2 for sit to supine and positioning in the bed for comfort. He appears to be stronger with sit to stand and has more confidence in his ability to stand but he continues to have increased difficulty managing his size and weight for functional tasks. He remains on 8L of hi-flow O2 with sats at 94%. This section established at most recent assessment PROBLEM LIST (Impairments causing functional limitations): 1. Decreased Strength 2. Decreased ADL/Functional Activities 3. Decreased Transfer Abilities 4. Decreased Ambulation Ability/Technique 5. Decreased Activity Tolerance INTERVENTIONS PLANNED: (Benefits and precautions of physical therapy have been discussed with the patient.) 1. Bed Mobility 2. Therapeutic Activites 3. Therapeutic Exercise/Strengthening 4. Transfer Training TREATMENT PLAN: Frequency/Duration: 3 times a week for duration of hospital stay Rehabilitation Potential For Stated Goals: Fair REHAB RECOMMENDATIONS (at time of discharge pending progress):   
Placement: It is my opinion, based on this patient's performance to date, that Mr. Deny Hester may benefit from intensive therapy at a [address] [place] [address] after discharge due to the functional deficits listed above that are likely to improve with skilled rehabilitation and concerns that he/she may be unsafe to be unsupervised at home due to weakness and history of falls. Equipment:  
? None at this time HISTORY:  
History of Present Injury/Illness (Reason for Referral): Pasha Zelaya is a 70 y.o. male who on Eliquis for afib who came to the ER on 1/17/20 after a fall 1-2 hrs prior to arrival. 
He also takes Indocin He had swelling and hematoma of the right upper anterior chest 
He fell from a standing position while using a Rollator and landed on the device and hit is right anterior chest.  
 He developed progressive severe swelling and hematoma He is on Eliquis for atrial fibrillation.  Over the past 2 hours since the fall the hematoma has expanded to a small melon. Very painful and firm. No SOB. Denies any hip or back pain. Also has associated right knee pain No LOC or head trauma Past Medical History/Comorbidities:  
Mr. Anais Acosta  has a past medical history of Arrhythmia, Arthritis, CAD (coronary artery disease), Cardiac pacemaker (11/12/2015), Chronic diastolic heart failure ([address] (11/12/2015), Chronic pain, Diabetes ([address] Diabetes mellitus type II, uncontrolled ([address] Dyspnea/shortness of breath (11/12/2015), GERD (gastroesophageal reflux disease), Heart failure ([address] HTN - uncontrolled, malignant (8/8/2016), Hyperlipidemia (11/12/2015), Hypertension, Malaise/fatigue/weakness/tiredness (11/12/2015), Mixed hyperlipidemia, Morbid obesity ([address] Orthostatic hypotension (11/12/2015), Other ill-defined conditions(799.89), Paroxysmal atrial fibrillation ([address] Permanent atrial fibrillation ([address] (11/12/2015), Tendon injury, and Unspecified sleep apnea. Mr. Anais Acosta  has a past surgical history that includes hx other surgical; hx hernia repair; pr left heart cath,percutaneous (3/31/2014); and hx pacemaker. Social History/Living Environment:  
Home Environment: Private residence # Steps to Enter: 1 One/Two Story Residence: One story Living Alone: No 
Support Systems: Spouse/Significant Other/Partner Patient Expects to be Discharged to[de-identified] Unknown Current DME Used/Available at Home: Camilo Carr, rollator, Commode, bedside Prior Level of Function/Work/Activity: 
Patient reports he has been ambulating with a rollator at home and uses his power scooter but her also reports several falls. Number of Personal Factors/Comorbidities that affect the Plan of Care: 3+: HIGH COMPLEXITY EXAMINATION:  
Most Recent Physical Functioning:  
Gross Assessment: 
AROM: Generally decreased, functional 
 PROM: Generally decreased, functional 
Strength: Generally decreased, functional 
Coordination: Grossly decreased, non-functional 
Tone: Normal 
Sensation: Intact Posture: 
  
Balance: 
Sitting: Impaired Sitting - Static: Good (unsupported) Sitting - Dynamic: Fair (occasional) Standing: Impaired Standing - Static: Fair;Constant support Standing - Dynamic : Fair Bed Mobility: 
Supine to Sit:Maximum assistance;Assist x2 Scooting: Maximum assistance;Assist x2 Wheelchair Mobility: 
  
Transfers: 
Sit to Stand: Assist x2; Moderate assistance;Maximum assistance Stand to Sit: Moderate assistance;Assist x2 Bed to Chair: Maximum assistance;Assist x2 Gait: 
  
Base of Support: Widened Speed/Edith: Slow;Shuffled Step Length: Left shortened;Right shortened Gait Abnormalities: Decreased step clearance Distance (ft): 6 Feet (ft) Assistive Device: Walker, rolling Ambulation - Level of Assistance: Moderate assistance;Assist x2 Body Structures Involved: 1. Muscles Body Functions Affected: 1. Neuromusculoskeletal 
2. Movement Related Activities and Participation Affected: 1. General Tasks and Demands 2. Mobility 3. Self Care Number of elements that affect the Plan of Care: 3: MODERATE COMPLEXITY CLINICAL PRESENTATION:  
Presentation: Evolving clinical presentation with changing clinical characteristics: MODERATE COMPLEXITY CLINICAL DECISION MAKIN [place] [id number] AM-PAC 6 Clicks Basic Mobility Inpatient Short Form How much difficulty does the patient currently have. .. Unable A Lot A Little None 1. Turning over in bed (including adjusting bedclothes, sheets and blankets)? [] 1   [x] 2   [] 3   [] 4  
2. Sitting down on and standing up from a chair with arms ( e.g., wheelchair, bedside commode, etc.)   [] 1   [x] 2   [] 3   [] 4  
3. Moving from lying on back to sitting on the side of the bed?    [] 1   [x] 2   [] 3   [] 4  
 How much help from another person does the patient currently need. .. Total A Lot A Little None 4. Moving to and from a bed to a chair (including a wheelchair)? [] 1   [x] 2   [] 3   [] 4  
5. Need to walk in hospital room? [] 1   [x] 2   [] 3   [] 4  
6. Climbing 3-5 steps with a railing? [x] 1   [] 2   [] 3   [] 4  
© 2007, Trustees of [address] [place] [address], under license to [employer]. All rights reserved Score:  Initial: 11 Most Recent: X (Date: -- ) Interpretation of Tool:  Represents activities that are increasingly more difficult (i.e. Bed mobility, Transfers, Gait). Medical Necessity:    
· Patient is expected to demonstrate progress in strength and functional technique to decrease assistance required with bed mobility, SPT and short distance gait with rollator. Reason for Services/Other Comments: 
· Patient continues to require skilled intervention due to medical complications. Use of outcome tool(s) and clinical judgement create a POC that gives a: Questionable prediction of patient's progress: MODERATE COMPLEXITY  
  
 
 
 
TREATMENT:  
(In addition to Assessment/Re-Assessment sessions the following treatments were rendered) Pre-treatment Symptoms/Complaints:  Patient states he has to go to the bathroom for a BM Pain: Initial:  
Pain Intensity 1: 1 Pain Location 1: Knee Pain Orientation 1: Right  Post Session:  0/10 Therapeutic Activity: (   19 mins): Therapeutic activities including Bed transfers, Chair transfers and sitting, sit to stand and standing to improve mobility and strength. Required moderate assist/cues to promote static and dynamic balance in standing. Performed warm-up exercises with his BLEs while seated in the bedside chair before standing. Date: 
1/22/20 Date: 
1/23/20 Date: Activity/Exercise Parameters Parameters Parameters Ankle pumps 2 x 10 2 x 10 Heel slides in supine 10 B 2 x 10 Hip IR/ER in supine 10 B    
 Seated knee extension 2 x 10 2 x 10 Seated marching hip flexion  2 x 10 Braces/Orthotics/Lines/Etc:  
· O2 Device: Hi flow nasal cannula 6L hi-flow Treatment/Session Assessment:   
· Response to Treatment:  Patient participated and did slightly better today with sit to stand. More confident and could hold standing position longer · Interdisciplinary Collaboration:  
o Physical Therapist 
o Registered Nurse 
o Certified Nursing Assistant/Patient Care Technician · After treatment position/precautions:  
o Back to bed  
o Bed/Chair-wheels locked 
o Bed in low position 
o Call light within reach 
o RN notified · Compliance with Program/Exercises: Will assess as treatment progresses · Recommendations/Intent for next treatment session: \"Next visit will focus on advancements to more challenging activities and reduction in assistance provided\". Total Treatment Duration: PT Patient Time In/Time Out Time In: 3362 Time Out: [address] [place]

## 2020-01-23 NOTE — PROGRESS NOTES
Hospitalist Note Admit Date:  2020  3:33 PM  
Name:  Madhu Berumen Age:  70 y.o. 
:  1940 MRN:  [id number] PCP:  Ga Bedoya MD 
Treatment Team: Attending Provider: Derek Steen MD; Utilization Review: Corrinne Hue, RN; Care Manager: Gustavo Arvizu; Staff Nurse: Brenda Gage, RN; Physical Therapist: Ryley Batista, BERENICE Date of service 20 HPI/Subjective:  
 
 
Mr. Nafisa Hicks is a 71 yo male with PMH of DM2, AFIB on eliquis, obesity, HFpEF, falls admitted s/p fall with resultant right chest wall hematoma with associated anemia, hypotension and coagulopathy. He required PRBC. Surgery consulted and no intervention needed. He has developed pulmonary edema/ acute hypoxia requiring IV lasix. Cardiology has evaluated and is agreeable to stop eliquis going forward. ECHO  LVEF 20-25%, milds concentric LVH. Discharge plans pending to home vs rehab. 
 
20 less short of breath, still on 10 L NC, eating ok, had BM, less urine output despite lasix, chest wall ok, has frequent falls, has chronic edema 19- I assumed care. Patient is seen resting in bed, generally do not feel great, no shortness of breath ,currently in room air . Blood pressure 111/77, urine output 1450 cc, patient denies any nausea no vomiting or abdominal pain. Objective:  
 
Patient Vitals for the past 24 hrs: 
 Temp Pulse Resp BP SpO2  
20 0737 97.7 °F (36.5 °C) 82 18 111/77 95 % 20 0328 98.2 °F (36.8 °C) 77 18 95/60 99 % 20 0204  80 19 92/50 94 % 20 2346 98.2 °F (36.8 °C) 99 18 (!) 82/45 100 % 20 2052     97 % 20 1903 98.9 °F (37.2 °C) 78 18 122/70 98 % 20 1735  77  102/56   
20 1647  72  (!) 88/52   
20 1619  (!) 105  (!) 78/46   
20 1601    (!) 85/50   
20 1545  (!) 105  (!) 75/41 99 % 20 1055 97.4 °F (36.3 °C) 80 18 136/79 97 % Oxygen Therapy O2 Sat (%): 95 % (01/23/20 0737) Pulse via Oximetry: 75 beats per minute (01/22/20 2052) O2 Device: Hi flow nasal cannula (01/22/20 2052) O2 Flow Rate (L/min): 8 l/min (01/22/20 2052) FIO2 (%): 60 % (01/21/20 0335) Estimated body mass index is 41.97 kg/m² as calculated from the following: 
  Height as of 12/21/19: 6' 3\" (1.905 m). Weight as of this encounter: 152.3 kg (335 lb 12.2 oz). Intake/Output Summary (Last 24 hours) at 1/23/2020 7088 Last data filed at 1/23/2020 8310 Gross per 24 hour Intake 470 ml Output 1450 ml Net -980 ml *Note that automatically entered I/Os may not be accurate; dependent on patient compliance with collection and accurate  by techs. General:    Well nourished. Alert. No distress, obese CV:   RRR. No murmur, rub, or gallop. 1+ edema b/L LE 
Lungs:   CTAB. No wheezing, rhonchi, or rales. Occasional                                          Basal crackles. Abdomen:   Soft, nontender, nondistended. obese Extremities: Warm and dry Skin:     Right chest wall bruising-evolving yellowish discoloration. Neuro:  No gross focal deficits. Data Review: 
I have reviewed all labs, meds, and studies from the last 24 hours: 
 
Recent Results (from the past 24 hour(s)) GLUCOSE, POC Collection Time: 01/22/20  9:16 AM  
Result Value Ref Range Glucose (POC) 151 (H) 65 - 100 mg/dL GLUCOSE, POC Collection Time: 01/22/20 12:00 PM  
Result Value Ref Range Glucose (POC) 148 (H) 65 - 100 mg/dL HGB & HCT Collection Time: 01/22/20  4:09 PM  
Result Value Ref Range HGB 9.1 (L) 13.6 - 17.2 g/dL HCT 30.5 (L) 41.1 - 50.3 % GLUCOSE, POC Collection Time: 01/22/20  4:28 PM  
Result Value Ref Range Glucose (POC) 198 (H) 65 - 100 mg/dL PLEASE READ & DOCUMENT PPD TEST IN 48 HRS Collection Time: 01/22/20  5:30 PM  
Result Value Ref Range PPD Negative Negative  
 mm Induration 0 0 - 5 mm GLUCOSE, POC  
 Collection Time: 01/22/20  9:00 PM  
Result Value Ref Range Glucose (POC) 212 (H) 65 - 100 mg/dL CBC W/O DIFF Collection Time: 01/23/20  5:05 AM  
Result Value Ref Range WBC 13.0 (H) 4.3 - 11.1 K/uL  
 RBC 3.43 (L) 4.23 - 5.6 M/uL HGB 8.3 (L) 13.6 - 17.2 g/dL HCT 28.0 (L) 41.1 - 50.3 % MCV 81.6 79.6 - 97.8 FL  
 MCH 24.2 (L) 26.1 - 32.9 PG  
 MCHC 29.6 (L) 31.4 - 35.0 g/dL RDW 22.5 (H) 11.9 - 14.6 % PLATELET 796 845 - 526 K/uL MPV 10.8 9.4 - 12.3 FL ABSOLUTE NRBC 0.00 0.0 - 0.2 K/uL METABOLIC PANEL, COMPREHENSIVE Collection Time: 01/23/20  5:05 AM  
Result Value Ref Range Sodium 138 136 - 145 mmol/L Potassium 4.6 3.5 - 5.1 mmol/L Chloride 106 98 - 107 mmol/L  
 CO2 25 21 - 32 mmol/L Anion gap 7 7 - 16 mmol/L Glucose 141 (H) 65 - 100 mg/dL BUN 28 (H) 8 - 23 MG/DL Creatinine 1.87 (H) 0.8 - 1.5 MG/DL  
 GFR est AA 45 (L) >60 ml/min/1.73m2 GFR est non-AA 37 (L) >60 ml/min/1.73m2 Calcium 8.5 8.3 - 10.4 MG/DL Bilirubin, total 0.8 0.2 - 1.1 MG/DL  
 ALT (SGPT) 11 (L) 12 - 65 U/L  
 AST (SGOT) 14 (L) 15 - 37 U/L Alk. phosphatase 33 (L) 50 - 136 U/L Protein, total 5.6 (L) 6.3 - 8.2 g/dL Albumin 1.9 (L) 3.2 - 4.6 g/dL Globulin 3.7 (H) 2.3 - 3.5 g/dL A-G Ratio 0.5 (L) 1.2 - 3.5 All Micro Results None Current Meds: 
Current Facility-Administered Medications Medication Dose Route Frequency  nystatin (MYCOSTATIN) 100,000 unit/gram powder   Topical BID  insulin glargine (LANTUS) injection 5 Units  5 Units SubCUTAneous QHS  LORazepam (ATIVAN) tablet 1 mg  1 mg Oral Q6H PRN  
 0.9% sodium chloride infusion 250 mL  250 mL IntraVENous PRN  
 [Held by provider] furosemide (LASIX) injection 40 mg  40 mg IntraVENous Q12H  
 famotidine (PEPCID) tablet 20 mg  20 mg Oral BID  albuterol-ipratropium (DUO-NEB) 2.5 MG-0.5 MG/3 ML  3 mL Nebulization Q4H PRN  
 gabapentin (NEURONTIN) capsule 300 mg  300 mg Oral Q12H  HYDROcodone-acetaminophen (NORCO) 7.5-325 mg per tablet 1 Tab  1 Tab Oral Q6H PRN  
 insulin lispro (HUMALOG) injection   SubCUTAneous AC&HS  ondansetron (ZOFRAN) injection 4 mg  4 mg IntraVENous Q6H PRN  probenecid (BENEMID) tablet 500 mg  500 mg Oral Q12H  
 sotalol (BETAPACE) tablet 120 mg  120 mg Oral Q12H Other Studies: 
Results for orders placed or performed during the hospital encounter of 20  
2D ECHO COMPLETE ADULT (TTE) W OR WO CONTR Narrative [place] [facility] [address] [place], [address] [facility] 
[phone] Transthoracic Echocardiogram 
2D, M-mode, Doppler, and Color Doppler Patient: Deborah Fox 
MR #: [id number] : 1940 Age: 70 years Gender: Male Study date: 2020 Account #: [de-identified] Height: 75 in 
Weight: 334.4 lb 
BSA: 2.73 mï¾² Status:Routine Location: 237 BP: 142/ 80 Allergies: CLINDAMYCIN, MEPERIDINE, LOSARTAN-HYDROCHLOROTHIAZIDE, PENICILLINS Sonographer:  Adry Diego [facility] Group:  [address] Cardiology Referring Physician:  Tabitha Cantrell. Rob Ardon MD 
Reading Physician:  Drake Castillo. Fritz Younger MD [facility] INDICATIONS: Pulmonary Edema PROCEDURE: This was a routine study. A transthoracic echocardiogram was 
performed. The study included complete 2D imaging, M-mode, complete spectral 
Doppler, and color Doppler. Intravenous contrast (Definity) was administered. Echocardiographic views were limited by restricted patient mobility and poor 
acoustic window availability. This was a technically difficult study. LEFT VENTRICLE: Size was normal. Systolic function was normal. Ejection 
fraction was estimated in the range of 20 % to 25 %. There was severe 
hypokinesis of the entire anterior, mid anteroseptal, mid inferoseptal,  
entire 
inferior, mid anterolateral, apical septal, apical lateral, and apical  
wall(s). There was mild concentric hypertrophy. The study was not technically  
sufficient to allow evaluation of LV diastolic function. RIGHT VENTRICLE: The size was normal. Systolic function was normal. A pacing 
wire was present. The tricuspid jet envelope definition was inadequate for 
estimation of RV systolic pressure. LEFT ATRIUM: The atrium was mildly dilated. RIGHT ATRIUM: Not well visualized. SYSTEMIC VEINS: IVC: The inferior vena cava was mildly dilated. The 
respirophasic change in diameter was less than 50%. AORTIC VALVE: The valve was probably trileaflet. There was no evidence for 
stenosis. There was no insufficiency. MITRAL VALVE: Valve structure was normal. There was no evidence for stenosis. There was no regurgitation. TRICUSPID VALVE: The valve structure was normal. There was no evidence for 
stenosis. There was mild regurgitation. PULMONIC VALVE: Not well visualized. There was no evidence for stenosis. There 
was trivial regurgitation. There was no insufficiency. PERICARDIUM: A possible, small pericardial effusion was identified posterior  
to 
the heart. AORTA: The root exhibited normal size. SUMMARY: 
 
-  Left ventricle: Systolic function was normal. Ejection fraction was 
estimated in the range of 20 % to 25 %. There was severe hypokinesis of the 
entire anterior, mid anteroseptal, mid inferoseptal, entire inferior, mid 
anterolateral, apical septal, apical lateral, and apical wall(s). There was 
mild concentric hypertrophy. -  Left atrium: The atrium was mildly dilated. -  Inferior vena cava, hepatic veins: The inferior vena cava was mildly 
dilated. The respirophasic change in diameter was less than 50%. -  Tricuspid valve: There was mild regurgitation. 
 
-  Pericardium: A possible, small pericardial effusion was identified  
posterior 
to the heart. SYSTEM MEASUREMENT TABLES 
 
2D mode AoR Diam (2D): 3.5 cm 
LA Dimension (2D): 4.7 cm IVS/LVPW (2D): 0.9 IVSd (2D): 1.3 cm LVIDd (2D): 4.4 cm LVIDs (2D): 3.1 cm 
 LVOT Area (2D): 3.5 cm2 LVPWd (2D): 1.3 cm RVIDd (2D): 3.3 cm Unspecified Scan Mode LVOT Diam: 2.1 cm Prepared and signed by 
 
Burgess Acevedo. Zach Vela MD [facility] Signed 22-Jan-2020 14:55:59 Xr Chest Sngl V Result Date: 1/22/2020 AP chest radiograph History: pulm edema, 70 years Male Comparison: Chest radiograph January 21, 2020 Findings:  Cardiac pacing device obscures part of the left hemithorax, leads appear to remain in anatomic position. Normal cardiomediastinal silhouette. Persistent low lung volumes. Patchy bilateral airspace opacities appear slightly improved since prior, may represent improving multifocal pneumonia. Trace bilateral pleural effusions unchanged. No evidence of pneumothorax. Visualized soft tissue and osseous structures otherwise unremarkable. Impression:  Slightly improving multifocal pneumonia. Assessment and Plan:  
 
Hospital Problems as of 1/23/2020 Date Reviewed: 7/15/2019 Codes Class Noted - Resolved POA * (Principal) Fall ICD-10-CM: W19. Myrl Mon ICD-9-CM: E888.9  1/17/2020 - Present Yes Hematoma of right chest wall ICD-10-CM: F93.254M ICD-9-CM: 922.1  1/17/2020 - Present Yes Coagulopathy ([address] from Eliquis adn Indocin use ICD-10-CM: D68.9 ICD-9-CM: 286.9  1/17/2020 - Present Yes Acute pain of right knee ICD-10-CM: M25.561 ICD-9-CM: 719.46  1/17/2020 - Present Yes Debility ICD-10-CM: R53.81 ICD-9-CM: 799.3  1/17/2020 - Present Yes Fluid overload ICD-10-CM: E87.70 ICD-9-CM: 276.69  1/17/2020 - Present Yes A-fib [facility]) ICD-10-CM: I48.91 
ICD-9-CM: 427.31  1/17/2020 - Present Unknown Hypotension ICD-10-CM: I95.9 ICD-9-CM: 458.9  1/17/2020 - Present Unknown Anticoagulated ICD-10-CM: Z79.01 
ICD-9-CM: V58.61  1/17/2020 - Present Unknown Chest wall hematoma ICD-10-CM: S20.219A 
ICD-9-CM: 922.1  1/17/2020 - Present Unknown Blood loss anemia ICD-10-CM: D50.0 ICD-9-CM: 280.0  1/17/2020 - Present Unknown Acute blood loss anemia ICD-10-CM: D62 
ICD-9-CM: 285.1  1/17/2020 - Present Unknown Subcutaneous hematoma ICD-10-CM: T14. 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 · Acute hypoxia/ pulmonary edema/ volume overload/ acute systolic on diastolic CHF:    ECHO EF 29-26%-UYJT concentric LVH. was hypotensive , held IV lasix , hooker placed with good urine output, off O2 , in room air, blood pressure still relatively low at 111/77, resume low dose Lasix 20 mg Q Daily with holding parameters. Creat 1.87 -  If the blood pressure and creat  stays acceptable would add ACE inhibitor. Cradiology on board seen 1/20/20 - await further recommendations. · AFIB: off eliquis, until he returns to the office to f/u w/ Dr. Densie Roberto after discharge. continued sotalol. · DM2: continued SSI,  lantus · Hypotension: still relatively low , asymptomatic and will followup BP closely · Leukocytosis: has chronic trend of elevated WBC, UA negative, CXR without pneumonia, followup daily CBC trends · Gout: on benemid DC planning/Dispo: Home vs STR  Pending, PT in progress /OT Diet:  DIET CARDIAC 
DVT ppx:  SCD Signed: 
Reymundo Flores MD

## 2020-01-24 NOTE — PROGRESS NOTES
This CM spoke with pt spouse and informed them that [facility] can not extend a bed offer due to facility being full. They would like referral sent to [facility] - this CM sent referral via [facility] this day. Will continue to follow.

## 2020-01-24 NOTE — PROGRESS NOTES
END OF SHIFT NOTE: 
 
INTAKE/OUTPUT 
01/23 0701 - 01/24 0700 In: 240 [P.O.:240] Out: 1500 [Urine:1500] Voiding: YES Catheter: NO 
Drain:   
 
 
 
 
 
Flatus: Patient does not have flatus present. Stool:  0 occurrences. Characteristics: 
Stool Assessment Stool Color: (No BM to assess) Stool Appearance: Formed Stool Amount: Medium Stool Source/Status: Rectum Emesis: 0 occurrences. Characteristics: VITAL SIGNS Patient Vitals for the past 12 hrs: 
 Temp Pulse Resp BP SpO2  
01/24/20 1502 97.4 °F (36.3 °C) 80 17 147/64 98 % 01/24/20 1157 97.4 °F (36.3 °C) 76 16 100/56 95 % 01/24/20 0913     98 % 01/24/20 0809 98.3 °F (36.8 °C) 76 16 171/89 98 % Pain Assessment Pain Intensity 1: 0 (01/24/20 1500) Pain Location 1: Knee Pain Intervention(s) 1: Medication (see MAR) Patient Stated Pain Goal: 0 Ambulating No 
Goldman discontinued. Shift report given to oncoming nurse at the bedside. Danilo Murrieta

## 2020-01-24 NOTE — PROGRESS NOTES
Nutrition Reason for assessment: Los Day 6 Assessment:  
Diet: DIET CARDIAC Regular Food/Nutrition Patient History:   
Pt is 69 yo male who presented after having a fall at home. Upon arriving he was found to have large hematoma in chest wall, this has reduced on its own without surgery. Pt has PMH  Of DM and CHF. Pt was sitting in bed having just finished breakfast at time of RD visit. Pt reports that he is eating pretty well. RD observed pt ate 100% of breakfast tray. Pt states he does eat a bit better at home because he likes everything. Pt also reports his intake has been up and down in the hospital because he is having some pain with his dentures. He is unsure why and it is not all the time. Pt states he has had no nausea or vomiting during his stay. Pt all reports he feels he is eating well enough and would not like a supplement at this time. Anthropometrics: 
 Height: 1.829 m , Weight: 152.3 kg (335 lb 12.2 oz), Weight Source: Bed, Body mass index is 41.97 kg/m². BMI class of obese. Macronutrient needs: CBW: 152.3kg EER: 0392-4636 kcal/day (11-14 kcals/kg ) EPR:  g/day (20% ) Intake/Comparative Standards: Per documentation, patient consuming average 81% of 4 recorded meals in 2 days. This meets % of kcal needs and % of protein needs. Patient Vitals for the past 100 hrs: 
 % Diet Eaten 01/23/20 1825 75 % 01/23/20 0841 100 % 01/22/20 1255 50 % 01/22/20 0925 100 % Nutrition Diagnosis: No nutrition diagnosis at this time. Nutrition Intervention: 
Meals and Snacks: Continue with current diet. Commercial Beverages and Supplements: None at this time per pt request 
Discharge Nutrition Plan: Too soon to determine Juan Francisco [address] [place] [address],   
[phone]

## 2020-01-24 NOTE — PROGRESS NOTES
Hospitalist Note Admit Date:  2020  3:33 PM  
Name:  Ghazal Dick Age:  70 y.o. 
:  1940 MRN:  [id number] PCP:  Donte Gage MD 
Treatment Team: Attending Provider: Vaughn Vasquez MD; Utilization Review: Jones Mendes, RN; Care Manager: Susan Colon; Physical Therapist: Evelia Camp, PT, DPT; Primary Nurse: Marcela Harp Date of service 20 HPI/Subjective:  
 
 
Mr. Melo Colbert is a 71 yo male with PMH of DM2, AFIB on eliquis, obesity, HFpEF, falls admitted s/p fall with resultant right chest wall hematoma with associated anemia, hypotension and coagulopathy. He required PRBC. Surgery consulted and no intervention needed. He has developed pulmonary edema/ acute hypoxia requiring IV lasix. Cardiology has evaluated and is agreeable to stop eliquis going forward. ECHO  LVEF 20-25%, milds concentric LVH. Discharge plans pending to SNF for  rehab. 
 
20 less short of breath, still on 10 L NC, eating ok, had BM, less urine output despite lasix, chest wall ok, has frequent falls, has chronic edema 2020 - I assumed care. Patient is seen resting in bed, generally do not feel great, no shortness of breath ,currently in room air . Blood pressure 111/77, urine output 1450 cc, patient denies any nausea no vomiting or abdominal pain. 2020-patient is seen resting in bed, he is awake alert and interactive today. No shortness of breath, no chest pain or cough. He denies any nausea vomiting or abdominal pain. Objective:  
 
Patient Vitals for the past 24 hrs: 
 Temp Pulse Resp BP SpO2  
20 1157 97.4 °F (36.3 °C) 76 16 100/56 95 % 20 0913     98 % 20 0809 98.3 °F (36.8 °C) 76 16 171/89 98 % 20 0442 97.8 °F (36.6 °C) 82 18 191/89 100 % 20 0010 98.1 °F (36.7 °C) 77 18 121/76 94 % 20 1940 98 °F (36.7 °C) 71 18 118/70 94 % 20 1540 97.7 °F (36.5 °C) 69 18 120/66 94 % Oxygen Therapy O2 Sat (%): 95 % (01/24/20 1157) Pulse via Oximetry: 75 beats per minute (01/22/20 2052) O2 Device: Nasal cannula (01/24/20 0913) O2 Flow Rate (L/min): 5 l/min(weaned to 4L) (01/24/20 0913) FIO2 (%): 60 % (01/21/20 0335) Estimated body mass index is 41.97 kg/m² as calculated from the following: 
  Height as of 12/21/19: 6' 3\" (1.905 m). Weight as of this encounter: 152.3 kg (335 lb 12.2 oz). Intake/Output Summary (Last 24 hours) at 1/24/2020 1445 Last data filed at 1/24/2020 1157 Gross per 24 hour Intake 120 ml Output 1125 ml Net -1005 ml *Note that automatically entered I/Os may not be accurate; dependent on patient compliance with collection and accurate  by techs. General:    Well nourished. Alert. No distress, obese CV:   RRR. No murmur, rub, or gallop. 1+ edema b/L LE 
Lungs:   CTAB. No wheezing, rhonchi, or rales. no crackles. Abdomen:   Soft, nontender, nondistended. obese Extremities: Warm and dry, bilateral lower extremity edema right slightly more than left, right lower extremity with some chronic erythema. Skin:     Right chest wall bruising-evolving yellowish discoloration. Neuro:  No gross focal deficits. Data Review: 
I have reviewed all labs, meds, and studies from the last 24 hours: 
 
Recent Results (from the past 24 hour(s)) GLUCOSE, POC Collection Time: 01/23/20  4:31 PM  
Result Value Ref Range Glucose (POC) 167 (H) 65 - 100 mg/dL GLUCOSE, POC Collection Time: 01/23/20  9:33 PM  
Result Value Ref Range Glucose (POC) 208 (H) 65 - 100 mg/dL CBC W/O DIFF Collection Time: 01/24/20  5:50 AM  
Result Value Ref Range WBC 12.1 (H) 4.3 - 11.1 K/uL  
 RBC 3.49 (L) 4.23 - 5.6 M/uL HGB 8.3 (L) 13.6 - 17.2 g/dL HCT 28.8 (L) 41.1 - 50.3 % MCV 82.5 79.6 - 97.8 FL  
 MCH 23.8 (L) 26.1 - 32.9 PG  
 MCHC 28.8 (L) 31.4 - 35.0 g/dL RDW 23.4 (H) 11.9 - 14.6 % PLATELET 014 070 - 040 K/uL MPV 10.7 9.4 - 12.3 FL ABSOLUTE NRBC 0.00 0.0 - 0.2 K/uL METABOLIC PANEL, COMPREHENSIVE Collection Time: 01/24/20  5:50 AM  
Result Value Ref Range Sodium 139 136 - 145 mmol/L Potassium 4.6 3.5 - 5.1 mmol/L Chloride 107 98 - 107 mmol/L  
 CO2 26 21 - 32 mmol/L Anion gap 6 (L) 7 - 16 mmol/L Glucose 126 (H) 65 - 100 mg/dL BUN 29 (H) 8 - 23 MG/DL Creatinine 1.63 (H) 0.8 - 1.5 MG/DL  
 GFR est AA 53 (L) >60 ml/min/1.73m2 GFR est non-AA 44 (L) >60 ml/min/1.73m2 Calcium 8.7 8.3 - 10.4 MG/DL Bilirubin, total 0.7 0.2 - 1.1 MG/DL  
 ALT (SGPT) 12 12 - 65 U/L  
 AST (SGOT) 19 15 - 37 U/L Alk. phosphatase 34 (L) 50 - 136 U/L Protein, total 5.9 (L) 6.3 - 8.2 g/dL Albumin 1.9 (L) 3.2 - 4.6 g/dL Globulin 4.0 (H) 2.3 - 3.5 g/dL A-G Ratio 0.5 (L) 1.2 - 3.5 GLUCOSE, POC Collection Time: 01/24/20  8:16 AM  
Result Value Ref Range Glucose (POC) 122 (H) 65 - 100 mg/dL GLUCOSE, POC Collection Time: 01/24/20 12:00 PM  
Result Value Ref Range Glucose (POC) 174 (H) 65 - 100 mg/dL All Micro Results None Current Meds: 
Current Facility-Administered Medications Medication Dose Route Frequency  furosemide (LASIX) tablet 20 mg  20 mg Oral DAILY  nystatin (MYCOSTATIN) 100,000 unit/gram powder   Topical BID  insulin glargine (LANTUS) injection 5 Units  5 Units SubCUTAneous QHS  LORazepam (ATIVAN) tablet 1 mg  1 mg Oral Q6H PRN  
 0.9% sodium chloride infusion 250 mL  250 mL IntraVENous PRN  
 [Held by provider] furosemide (LASIX) injection 40 mg  40 mg IntraVENous Q12H  
 famotidine (PEPCID) tablet 20 mg  20 mg Oral BID  albuterol-ipratropium (DUO-NEB) 2.5 MG-0.5 MG/3 ML  3 mL Nebulization Q4H PRN  
 gabapentin (NEURONTIN) capsule 300 mg  300 mg Oral Q12H  
 HYDROcodone-acetaminophen (NORCO) 7.5-325 mg per tablet 1 Tab  1 Tab Oral Q6H PRN  
 insulin lispro (HUMALOG) injection   SubCUTAneous AC&HS  
  ondansetron (ZOFRAN) injection 4 mg  4 mg IntraVENous Q6H PRN  probenecid (BENEMID) tablet 500 mg  500 mg Oral Q12H  
 sotalol (BETAPACE) tablet 120 mg  120 mg Oral Q12H Other Studies: 
Results for orders placed or performed during the hospital encounter of 20  
2D ECHO COMPLETE ADULT (TTE) W OR WO CONTR Narrative [place] [address] [place], [address] [facility] 
[phone] Transthoracic Echocardiogram 
2D, M-mode, Doppler, and Color Doppler Patient: Kanu Reid 
MR #: [id number] : 1940 Age: 70 years Gender: Male Study date: 2020 Account #: [de-identified] Height: 75 in 
Weight: 334.4 lb 
BSA: 2.73 mï¾² Status:Routine Location: 237 BP: 142/ 80 Allergies: CLINDAMYCIN, MEPERIDINE, LOSARTAN-HYDROCHLOROTHIAZIDE, PENICILLINS Sonographer:  Ibrahima Chavez [facility] Group:  [facility] Referring Physician:  Leopoldo Berger. Demar Euceda MD 
Reading Physician:  Edwardo Runner. [address], MD [facility] INDICATIONS: Pulmonary Edema PROCEDURE: This was a routine study. A transthoracic echocardiogram was 
performed. The study included complete 2D imaging, M-mode, complete spectral 
Doppler, and color Doppler. Intravenous contrast (Definity) was administered. Echocardiographic views were limited by restricted patient mobility and poor 
acoustic window availability. This was a technically difficult study. LEFT VENTRICLE: Size was normal. Systolic function was normal. Ejection 
fraction was estimated in the range of 20 % to 25 %. There was severe 
hypokinesis of the entire anterior, mid anteroseptal, mid inferoseptal,  
entire 
inferior, mid anterolateral, apical septal, apical lateral, and apical  
wall(s). There was mild concentric hypertrophy. The study was not technically  
sufficient 
to allow evaluation of LV diastolic function. RIGHT VENTRICLE: The size was normal. Systolic function was normal. A pacing wire was present. The tricuspid jet envelope definition was inadequate for 
estimation of RV systolic pressure. LEFT ATRIUM: The atrium was mildly dilated. RIGHT ATRIUM: Not well visualized. SYSTEMIC VEINS: IVC: The inferior vena cava was mildly dilated. The 
respirophasic change in diameter was less than 50%. AORTIC VALVE: The valve was probably trileaflet. There was no evidence for 
stenosis. There was no insufficiency. MITRAL VALVE: Valve structure was normal. There was no evidence for stenosis. There was no regurgitation. TRICUSPID VALVE: The valve structure was normal. There was no evidence for 
stenosis. There was mild regurgitation. PULMONIC VALVE: Not well visualized. There was no evidence for stenosis. There 
was trivial regurgitation. There was no insufficiency. PERICARDIUM: A possible, small pericardial effusion was identified posterior  
to 
the heart. AORTA: The root exhibited normal size. SUMMARY: 
 
-  Left ventricle: Systolic function was normal. Ejection fraction was 
estimated in the range of 20 % to 25 %. There was severe hypokinesis of the 
entire anterior, mid anteroseptal, mid inferoseptal, entire inferior, mid 
anterolateral, apical septal, apical lateral, and apical wall(s). There was 
mild concentric hypertrophy. -  Left atrium: The atrium was mildly dilated. -  Inferior vena cava, hepatic veins: The inferior vena cava was mildly 
dilated. The respirophasic change in diameter was less than 50%. -  Tricuspid valve: There was mild regurgitation. 
 
-  Pericardium: A possible, small pericardial effusion was identified  
posterior 
to the heart. SYSTEM MEASUREMENT TABLES 
 
2D mode AoR Diam (2D): 3.5 cm 
LA Dimension (2D): 4.7 cm IVS/LVPW (2D): 0.9 IVSd (2D): 1.3 cm LVIDd (2D): 4.4 cm LVIDs (2D): 3.1 cm 
LVOT Area (2D): 3.5 cm2 LVPWd (2D): 1.3 cm RVIDd (2D): 3.3 cm Unspecified Scan Mode LVOT Diam: 2.1 cm Prepared and signed by 
 
 Jt Curry. Lali White MD [facility] Signed 22-Jan-2020 14:55:59 No results found. Assessment and Plan:  
 
Hospital Problems as of 1/24/2020 Date Reviewed: 7/15/2019 Codes Class Noted - Resolved POA * (Principal) Fall ICD-10-CM: W19Tamia Vasquez ICD-9-CM: E888.9  1/17/2020 - Present Yes Hematoma of right chest wall ICD-10-CM: G93.906L ICD-9-CM: 922.1  1/17/2020 - Present Yes Coagulopathy ([address] from Eliquis adn Indocin use ICD-10-CM: D68.9 ICD-9-CM: 286.9  1/17/2020 - Present Yes Acute pain of right knee ICD-10-CM: M25.561 ICD-9-CM: 719.46  1/17/2020 - Present Yes Debility ICD-10-CM: R53.81 ICD-9-CM: 799.3  1/17/2020 - Present Yes Fluid overload ICD-10-CM: E87.70 ICD-9-CM: 276.69  1/17/2020 - Present Yes A-fib [facility]) ICD-10-CM: I48.91 
ICD-9-CM: 427.31  1/17/2020 - Present Unknown Hypotension ICD-10-CM: I95.9 ICD-9-CM: 458.9  1/17/2020 - Present Unknown Anticoagulated ICD-10-CM: Z79.01 
ICD-9-CM: V58.61  1/17/2020 - Present Unknown Chest wall hematoma ICD-10-CM: S20.219A 
ICD-9-CM: 922.1  1/17/2020 - Present Unknown Blood loss anemia ICD-10-CM: D50.0 ICD-9-CM: 280.0  1/17/2020 - Present Unknown Acute blood loss anemia ICD-10-CM: D62 
ICD-9-CM: 285.1  1/17/2020 - Present Unknown Subcutaneous hematoma ICD-10-CM: T14. 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  
· Acute hypoxia/ pulmonary edema/ volume overload/ acute systolic on diastolic CHF:    ECHO EF 92-79%-NEAM concentric LVH. was hypotensive , held IV lasix , hooker placed with good urine output, off O2 , in room air, blood pressure still relatively low at 100/56,  low dose Lasix 20 mg Q Daily with holding parameters. Creat 1.63<< 1.87 -  If the blood pressure and creat  stays acceptable would add ACE inhibitor. Cradiology on board seen 1/20/20 - await further recommendations. · AFIB: off eliquis, until he returns to the office to f/u w/ Dr. Suzie Torres after discharge. continued sotalol. · DM2: continued SSI,  lantus · Hypotension: still relatively low , asymptomatic and will followup BP closely · Leukocytosis: has chronic trend of elevated WBC, UA negative, CXR without pneumonia, followup daily CBC trends · Gout: on benemid. · B/L LE edema Right > left - venous doppler f/u  r/o DVT. DC planning/Dispo:SNF for STR  - referal sent , waiting for bed -  PT in progress /OT Diet:  DIET CARDIAC 
DVT ppx:  SCD Signed: 
Royal Sigala MD

## 2020-01-24 NOTE — PROGRESS NOTES
Problem: Mobility Impaired (Adult and Pediatric) Goal: *Acute Goals and Plan of Care (Insert Text) Description Goals: 
(1.)Mr. Parish James will move from supine to sit and sit to supine , scoot up and down and roll side to side with CONTACT GUARD ASSIST within 5 treatment day(s). (2.)Mr. Parish James will transfer from bed to chair and chair to bed with MINIMAL ASSIST using the least restrictive device within 5 treatment day(s). (3.)Mr. Parish James will ambulate with MINIMAL ASSIST for 25-50 feet with the least restrictive device within 5 treatment day(s). PHYSICAL THERAPY: Daily Note 1/24/2020 INPATIENT: PT Visit Days : 3 [place] / [place] / Product Type: Medicare /   
  
NAME/AGE/GENDER: Josseline Ríos is a 70 y.o. male PRIMARY DIAGNOSIS: Chest wall hematoma [S20.219A] Hypotension [I95.9] A-fib ([address] [I48.91] Anticoagulated [Z79.01] Acute blood loss anemia [D62] Subcutaneous hematoma [T14. Rutha Mendez Fall Fall ICD-10: Treatment Diagnosis:  
 · Generalized Muscle Weakness (M62.81) · Difficulty in walking, Not elsewhere classified (R26.2) · Repeated Falls (R29.6) Precaution/Allergies: 
Clindamycin; Demerol [meperidine]; Losartan-hydrochlorothiazide; and Pcn [penicillins] ASSESSMENT:  
 
Patient presents supine in bed. Trunk leaning left and head left sidebent. Worked on obtaining midline positioning, but patient had difficulty maintaining. Patient performed LE exercises in bed. Great effort and participation. Will continue to work towards goals. This section established at most recent assessment PROBLEM LIST (Impairments causing functional limitations): 1. Decreased Strength 2. Decreased ADL/Functional Activities 3. Decreased Transfer Abilities 4. Decreased Ambulation Ability/Technique 5. Decreased Activity Tolerance INTERVENTIONS PLANNED: (Benefits and precautions of physical therapy have been discussed with the patient.) 1. Bed Mobility 2. Therapeutic Activites 3. Therapeutic Exercise/Strengthening 4. Transfer Training TREATMENT PLAN: Frequency/Duration: 3 times a week for duration of hospital stay Rehabilitation Potential For Stated Goals: Fair REHAB RECOMMENDATIONS (at time of discharge pending progress):   
Placement: It is my opinion, based on this patient's performance to date, that Mr. Farhan Chauhan may benefit from intensive therapy at a [address] [place] [address] after discharge due to the functional deficits listed above that are likely to improve with skilled rehabilitation and concerns that he/she may be unsafe to be unsupervised at home due to weakness and history of falls. Equipment:  
? None at this time HISTORY:  
History of Present Injury/Illness (Reason for Referral): Amy Nguyễn is a 70 y.o. male who on Eliquis for afib who came to the ER on 1/17/20 after a fall 1-2 hrs prior to arrival. 
He also takes Indocin He had swelling and hematoma of the right upper anterior chest 
He fell from a standing position while using a Rollator and landed on the device and hit is right anterior chest.  
He developed progressive severe swelling and hematoma He is on Eliquis for atrial fibrillation.  Over the past 2 hours since the fall the hematoma has expanded to a small melon. Very painful and firm. No SOB. Denies any hip or back pain. Also has associated right knee pain No LOC or head trauma Past Medical History/Comorbidities:  
Mr. Farhan Chauhan  has a past medical history of Arrhythmia, Arthritis, CAD (coronary artery disease), Cardiac pacemaker (11/12/2015), Chronic diastolic heart failure ([address] (11/12/2015), Chronic pain, Diabetes ([address] Diabetes mellitus type II, uncontrolled ([address] Dyspnea/shortness of breath (11/12/2015), GERD (gastroesophageal reflux disease), Heart failure ([address] HTN - uncontrolled, malignant (8/8/2016), Hyperlipidemia (11/12/2015), Hypertension, Malaise/fatigue/weakness/tiredness (2015), Mixed hyperlipidemia, Morbid obesity ([facility] Orthostatic hypotension (2015), Other ill-defined conditions(799.89), Paroxysmal atrial fibrillation ([facility] Permanent atrial fibrillation ([facility] (2015), Tendon injury, and Unspecified sleep apnea. Mr. Bishop Cho  has a past surgical history that includes hx other surgical; hx hernia repair; pr left heart cath,percutaneous (3/31/2014); and hx pacemaker. Social History/Living Environment:  
Home Environment: Private residence # Steps to Enter: 1 One/Two Story Residence: One story Living Alone: No 
Support Systems: Spouse/Significant Other/Partner Patient Expects to be Discharged to[de-identified] Unknown Current DME Used/Available at Home: Kitty Oconnell, rollator, Commode, bedside Prior Level of Function/Work/Activity: 
Patient reports he has been ambulating with a rollator at home and uses his power scooter but her also reports several falls. Number of Personal Factors/Comorbidities that affect the Plan of Care: 3+: HIGH COMPLEXITY EXAMINATION:  
Most Recent Physical Functioning:  
Gross Assessment: 
  
         
  
Posture: 
  
Balance: 
  Bed Mobility: 
Supine to Sit:Maximum assistance;Assist x2 Scooting: Maximum assistance;Assist x2 Wheelchair Mobility: 
  
Transfers: 
  
Gait: 
  
   
  
Body Structures Involved: 1. Muscles Body Functions Affected: 1. Neuromusculoskeletal 
2. Movement Related Activities and Participation Affected: 1. General Tasks and Demands 2. Mobility 3. Self Care Number of elements that affect the Plan of Care: 3: MODERATE COMPLEXITY CLINICAL PRESENTATION:  
Presentation: Evolving clinical presentation with changing clinical characteristics: MODERATE COMPLEXITY CLINICAL DECISION MAKIN [facility] [address] [address] AM-PAC 6 Clicks Basic Mobility Inpatient Short Form How much difficulty does the patient currently have. .. Unable A Lot A Little None 1.  Turning over in bed (including adjusting bedclothes, sheets and blankets)? [] 1   [x] 2   [] 3   [] 4  
2. Sitting down on and standing up from a chair with arms ( e.g., wheelchair, bedside commode, etc.)   [] 1   [x] 2   [] 3   [] 4  
3. Moving from lying on back to sitting on the side of the bed? [] 1   [x] 2   [] 3   [] 4 How much help from another person does the patient currently need. .. Total A Lot A Little None 4. Moving to and from a bed to a chair (including a wheelchair)? [] 1   [x] 2   [] 3   [] 4  
5. Need to walk in hospital room? [] 1   [x] 2   [] 3   [] 4  
6. Climbing 3-5 steps with a railing? [x] 1   [] 2   [] 3   [] 4  
© 2007, Trustees of [address] [place] [address], under license to [employer]. All rights reserved Score:  Initial: 11 Most Recent: X (Date: -- ) Interpretation of Tool:  Represents activities that are increasingly more difficult (i.e. Bed mobility, Transfers, Gait). Medical Necessity:    
· Patient is expected to demonstrate progress in strength and functional technique to decrease assistance required with bed mobility, SPT and short distance gait with rollator. Reason for Services/Other Comments: 
· Patient continues to require skilled intervention due to medical complications. Use of outcome tool(s) and clinical judgement create a POC that gives a: Questionable prediction of patient's progress: MODERATE COMPLEXITY  
  
 
 
 
TREATMENT:  
(In addition to Assessment/Re-Assessment sessions the following treatments were rendered) Pre-treatment Symptoms/Complaints:  None. Pain: Initial:  
Pain Intensity 1: 0  Post Session:  0/10 Therapeutic Activity: (   mins): Therapeutic activities including Bed transfers, Chair transfers and sitting, sit to stand and standing to improve mobility and strength. Required moderate assist/cues to promote static and dynamic balance in standing.  Performed warm-up exercises with his BLEs while seated in the bedside chair before standing. Therapeutic Exercise: (  15):  Exercises per grid below to improve mobility, strength and coordination. Required minimal visual and verbal cues to promote proper body alignment. Progressed complexity of movement as indicated. Encouraged patient to perform ex's on his own as able several times/day. Date: 
1/22/20 Date: 
1/23/20 Date: 
1/24/20 Activity/Exercise Parameters Parameters Parameters Ankle pumps 2 x 10 2 x 10 2x10 AB Heel slides in supine 10 B 2 x 10 2x10 AB Hip IR/ER in supine 10 B  2x10 AB Seated knee extension 2 x 10 2 x 10 Seated marching hip flexion  2 x 10 Hip ab/ad supine   2x10 AB Braces/Orthotics/Lines/Etc:  
· hooker catheter · O2 Device: Hi flow nasal cannula Treatment/Session Assessment:   
· Response to Treatment:   
· Interdisciplinary Collaboration:  
o Physical Therapist 
o Registered Nurse 
o Certified Nursing Assistant/Patient Care Technician · After treatment position/precautions:  
o Supine in bed  
o Bed/Chair-wheels locked 
o Bed in low position 
o Call light within reach 
o RN notified · Compliance with Program/Exercises: Will assess as treatment progresses · Recommendations/Intent for next treatment session: \"Next visit will focus on advancements to more challenging activities and reduction in assistance provided\". Total Treatment Duration: PT Patient Time In/Time Out Time In:1500 Time Out: 1515 A Mily Toribio, PT, DPT

## 2020-01-25 PROBLEM — J81.1 PULMONARY EDEMA: Status: ACTIVE | Noted: 2020-01-01

## 2020-01-25 NOTE — PROGRESS NOTES
Spoke with pt's wife who was concerned about changed mental status of pt. Contacted Dr Micki Mccarty who order a non-contrast head CT. Also discussed elevated BP. Ordered to continue to monitor for now.

## 2020-01-25 NOTE — PROGRESS NOTES
Problem: Diabetes Self-Management Goal: *Disease process and treatment process Description Define diabetes and identify own type of diabetes; list 3 options for treating diabetes. Outcome: Progressing Towards Goal 
Goal: *Incorporating nutritional management into lifestyle Description Describe effect of type, amount and timing of food on blood glucose; list 3 methods for planning meals. Outcome: Progressing Towards Goal 
Goal: *Incorporating physical activity into lifestyle Description State effect of exercise on blood glucose levels. Outcome: Progressing Towards Goal 
Goal: *Developing strategies to promote health/change behavior Description Define the ABC's of diabetes; identify appropriate screenings, schedule and personal plan for screenings. Outcome: Progressing Towards Goal 
Goal: *Using medications safely Description State effect of diabetes medications on diabetes; name diabetes medication taking, action and side effects. Outcome: Progressing Towards Goal 
Goal: *Monitoring blood glucose, interpreting and using results Description Identify recommended blood glucose targets  and personal targets. Outcome: Progressing Towards Goal 
Goal: *Prevention, detection, treatment of acute complications Description List symptoms of hyper- and hypoglycemia; describe how to treat low blood sugar and actions for lowering  high blood glucose level. Outcome: Progressing Towards Goal 
Goal: *Prevention, detection and treatment of chronic complications Description Define the natural course of diabetes and describe the relationship of blood glucose levels to long term complications of diabetes. Outcome: Progressing Towards Goal 
Goal: *Developing strategies to address psychosocial issues Description Describe feelings about living with diabetes; identify support needed and support network Outcome: Progressing Towards Goal 
Goal: *Sick day guidelines Outcome: Progressing Towards Goal

## 2020-01-25 NOTE — PROGRESS NOTES
END OF SHIFT NOTE: 
 
INTAKE/OUTPUT 
01/24 0701 - 01/25 0700 In: -  
Out: 170 [[id number]] Voiding: YES Catheter: NO 
Drain:   
 
 
 
 
 
Flatus: Patient does have flatus present. Stool:  0 occurrences. Characteristics: 
Stool Assessment Stool Color: (No BM to assess) Stool Appearance: Formed Stool Amount: Medium Stool Source/Status: Rectum Emesis: 0 occurrences. Characteristics: VITAL SIGNS Patient Vitals for the past 12 hrs: 
 Temp Pulse Resp BP SpO2  
01/25/20 0359 98.4 °F (36.9 °C) 81 18 128/76 94 % 01/25/20 0000 98.6 °F (37 °C) 81 18 136/72 94 % 01/24/20 1921 98 °F (36.7 °C) 78 18 139/72 100 % Pain Assessment Pain Intensity 1: 6 (01/24/20 1748) Pain Location 1: Generalized Pain Intervention(s) 1: Medication (see MAR) Patient Stated Pain Goal: 2 Ambulating No 
 
Shift report given to oncoming nurse at the bedside.  
 
Marcella Villanueva RN

## 2020-01-25 NOTE — PROGRESS NOTES
Hospitalist Progress Note 2020 Admit Date: 2020  3:33 PM  
NAME: Christian Francisco :  1940 MRN:  [id number] Attending: Nica Hall MD 
PCP:  Ricardo Migeul MD 
 
SUBJECTIVE: As previously documented: 'Mr. Kenia Cassidy is a 69 yo male with PMH of DM2, AFIB on eliquis, obesity, HFpEF, falls admitted s/p fall with resultant right chest wall hematoma with associated anemia, hypotension and coagulopathy. He required PRBC. Surgery consulted and no intervention needed. He has developed pulmonary edema/ acute hypoxia requiring IV lasix. Cardiology has evaluated and is agreeable to stop eliquis going forward. ECHO  LVEF 20-25%, milds concentric LVH. 
  
Discharge plans pending to SNF for  rehab.' 
 
- Reports he is feeling better overall. Wanting to start aggressive rehab to get stronger. Hemoglobin has been stable. No beds at [facility]; referral to [address] [place] [address] placed yesterday. Review of Systems negative with exception of pertinent positives noted above PHYSICAL EXAM  
 
Visit Vitals /86 Pulse 75 Temp 98.5 °F (36.9 °C) Resp 18 Wt 152.3 kg (335 lb 12.2 oz) SpO2 100% BMI 41.97 kg/m² Temp (24hrs), Av.1 °F (36.7 °C), Min:97.4 °F (36.3 °C), Max:98.6 °F (37 °C) Patient Vitals for the past 24 hrs: 
 Temp Pulse Resp BP SpO2  
20 0700 98.5 °F (36.9 °C) 75 18 148/86 100 % 20 0359 98.4 °F (36.9 °C) 81 18 128/76 94 % 20 0000 98.6 °F (37 °C) 81 18 136/72 94 % 20 1921 98 °F (36.7 °C) 78 18 139/72 100 % 20 1502 97.4 °F (36.3 °C) 80 17 147/64 98 % 20 1157 97.4 °F (36.3 °C) 76 16 100/56 95 % 20 0913     98 % 20 0809 98.3 °F (36.8 °C) 76 16 171/89 98 % Oxygen Therapy O2 Sat (%): 100 % (20 0700) Pulse via Oximetry: 75 beats per minute (20) O2 Device: Nasal cannula (20) O2 Flow Rate (L/min): 4 l/min (20) FIO2 (%): 60 % (20) Intake/Output Summary (Last 24 hours) at 1/25/2020 3079 Last data filed at 1/24/2020 1157 Gross per 24 hour Intake  Output 325 ml Net -325 ml General: No acute distress, morbidly obese Lungs:  CTA Bilaterally anteriorly. Heart:  Irregular rhythm, rate controlled, 1+ leg edema Abdomen: Soft, Non distended, Non tender, Positive bowel sounds Extremities: No cyanosis, 1+ leg edema Neurologic:  No focal deficits Chest:  Large R chest hematoma noted anterior and posteriorly Recent Results (from the past 24 hour(s)) GLUCOSE, POC Collection Time: 01/24/20  8:16 AM  
Result Value Ref Range Glucose (POC) 122 (H) 65 - 100 mg/dL GLUCOSE, POC Collection Time: 01/24/20 12:00 PM  
Result Value Ref Range Glucose (POC) 174 (H) 65 - 100 mg/dL GLUCOSE, POC Collection Time: 01/24/20  5:02 PM  
Result Value Ref Range Glucose (POC) 172 (H) 65 - 100 mg/dL GLUCOSE, POC Collection Time: 01/24/20 10:06 PM  
Result Value Ref Range Glucose (POC) 186 (H) 65 - 100 mg/dL CBC W/O DIFF Collection Time: 01/25/20  5:11 AM  
Result Value Ref Range WBC 10.9 4.3 - 11.1 K/uL  
 RBC 3.48 (L) 4.23 - 5.6 M/uL HGB 8.5 (L) 13.6 - 17.2 g/dL HCT 28.3 (L) 41.1 - 50.3 % MCV 81.3 79.6 - 97.8 FL  
 MCH 24.4 (L) 26.1 - 32.9 PG  
 MCHC 30.0 (L) 31.4 - 35.0 g/dL RDW 23.2 (H) 11.9 - 14.6 % PLATELET 971 561 - 032 K/uL MPV 10.5 9.4 - 12.3 FL ABSOLUTE NRBC 0.00 0.0 - 0.2 K/uL METABOLIC PANEL, COMPREHENSIVE Collection Time: 01/25/20  5:11 AM  
Result Value Ref Range Sodium 138 136 - 145 mmol/L Potassium 4.4 3.5 - 5.1 mmol/L Chloride 105 98 - 107 mmol/L  
 CO2 27 21 - 32 mmol/L Anion gap 6 (L) 7 - 16 mmol/L Glucose 132 (H) 65 - 100 mg/dL BUN 24 (H) 8 - 23 MG/DL Creatinine 1.40 0.8 - 1.5 MG/DL  
 GFR est AA >60 >60 ml/min/1.73m2 GFR est non-AA 52 (L) >60 ml/min/1.73m2 Calcium 8.8 8.3 - 10.4 MG/DL  Bilirubin, total 0.8 0.2 - 1.1 MG/DL  
 ALT (SGPT) 15 12 - 65 U/L  
 AST (SGOT) 19 15 - 37 U/L Alk. phosphatase 36 (L) 50 - 136 U/L Protein, total 6.0 (L) 6.3 - 8.2 g/dL Albumin 2.1 (L) 3.2 - 4.6 g/dL Globulin 3.9 (H) 2.3 - 3.5 g/dL A-G Ratio 0.5 (L) 1.2 - 3.5 DUPLEX LOWER EXT VENOUS BILAT Final Result IMPRESSION:   
1. No evidence of DVT in either leg. 2. Bilateral knee Baker's cysts. XR CHEST SNGL V Final Result Impression:  Slightly improving multifocal pneumonia. XR CHEST SNGL V Final Result IMPRESSION: Pulmonary edema. CT CHEST W CONT Final Result IMPRESSION:  
1. Large right chest/breast hematoma with findings raising suspicion for active  
bleeding. 2. Mild interstitial opacities suggesting underlying chronic change. 3. Left lower lobe nodule. If the patient is considered high-risk for  
malignancy, follow-up scanning in one year would be recommended. Otherwise no  
further follow-up is required. 4. Indeterminate low density upper pole left renal lesion. CT scanning using a  
renal mass protocol may be beneficial for further evaluation on a nonemergent  
basis. This could also assess the indeterminate right adrenal gland nodule. XR CHEST PORT Final Result Impression: CHF/volume overload however improved over prior exam.  
  
CPT code(s) 37404 XR KNEE RT 3 V Final Result Impression: Moderate to severe medial joint space loss. 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 · CXR has improved. · Wean oxygen as tolerated. · A fib- Eliquis discontinued and recommend discontinuing on discharge due to high fall risk · Gout- probenecid · Bilateral leg edema- likely related to Baker's cyst 
· Debility/falls- PT Dispo- discharge to SNF (await bed offer) DVT Prophylaxis: SCDs Signed By: Mita Marsh MD   
 January 25, 2020

## 2020-01-26 NOTE — PROGRESS NOTES
END OF SHIFT NOTE: 
 
INTAKE/OUTPUT 
01/25 0701 - 01/26 0700 In: 120 [P.O.:120] Out: -  
Voiding: YES Catheter: NO 
Drain:   
 
Incontinent briefs Flatus: Patient does have flatus present. Stool:  0 occurrences. Characteristics: 
Stool Assessment Stool Color: (No BM to assess) Stool Appearance: Formed Stool Amount: Medium Stool Source/Status: Rectum Emesis: 0 occurrences. Characteristics: VITAL SIGNS Patient Vitals for the past 12 hrs: 
 Temp Pulse Resp BP SpO2  
01/26/20 0220 98.1 °F (36.7 °C) 80 18 168/87 96 % 01/25/20 2320 98.2 °F (36.8 °C) 84 20 (!) 152/96 97 % 01/25/20 1930 97.7 °F (36.5 °C) 81 18 (!) 180/100 98 % Pain Assessment Pain Intensity 1: 0 (01/25/20 1930) Pain Location 1: Generalized Pain Intervention(s) 1: Emotional support, Rest 
Patient Stated Pain Goal: 0 Ambulating No 
 
Shift report given to oncoming nurse at the bedside.  
 
Laury Myers RN

## 2020-01-26 NOTE — PROGRESS NOTES
Patient appears to be confused. States he is ready to check out of this motel. Oriented to name and situation. B/P continues to be elevated. SQBS at 145. Incontinent of urine and wearing a brief.

## 2020-01-26 NOTE — PROGRESS NOTES
Hospitalist Progress Note 2020 Admit Date: 2020  3:33 PM  
NAME: Chinedu Gonsales :  1940 MRN:  [id number] Attending: Bay Nunez MD 
PCP:  Cale Ramirez MD 
 
SUBJECTIVE:  
 
71 yo male with PMH of DM2, AFIB on eliquis, obesity, HFpEF, falls admitted s/p fall with resultant right chest wall hematoma with associated anemia, hypotension and coagulopathy. He required PRBC. Surgery consulted and no intervention needed. He has developed pulmonary edema/ acute hypoxia requiring IV lasix. Cardiology has evaluated and is agreeable to stop eliquis going forward. ECHO  LVEF 20-25%, milds concentric LVH. 
  
Today, has occasional sob, on 4L O2 
 
PHYSICAL EXAM  
 
Visit Vitals /76 Pulse 83 Temp 97.4 °F (36.3 °C) Resp 18 Wt 152.3 kg (335 lb 12.2 oz) SpO2 100% BMI 41.97 kg/m² Temp (24hrs), Av.7 °F (36.5 °C), Min:97 °F (36.1 °C), Max:98.2 °F (36.8 °C) Patient Vitals for the past 24 hrs: 
 Temp Pulse Resp BP SpO2  
20 1201 97.4 °F (36.3 °C) 83 18 142/76 100 % 20 0752 97.5 °F (36.4 °C) 80 18 (!) 169/93 100 % 20 0220 98.1 °F (36.7 °C) 80 18 168/87 96 % 20 2320 98.2 °F (36.8 °C) 84 20 (!) 152/96 97 % 20 1930 97.7 °F (36.5 °C) 81 18 (!) 180/100 98 % 20 1528 97 °F (36.1 °C) (!) 104 18 (!) 172/95 96 % Oxygen Therapy O2 Sat (%): 100 % (20 1201) Pulse via Oximetry: 75 beats per minute (20) O2 Device: Nasal cannula (20) O2 Flow Rate (L/min): 4 l/min (01/25/20 1930) FIO2 (%): 60 % (20 1951) No intake or output data in the 24 hours ending 20 1422 General: AAO3, moderate distress, morbidly obese Lungs:  CTA Bilaterally anteriorly. Heart:  Irregular rhythm, rate controlled, 1+ leg edema Abdomen: Soft, Non distended, Non tender, Positive bowel sounds Extremities: No cyanosis, 2 + legs edema Neurologic:  No focal deficits Chest:  Large R chest hematoma noted anterior and posteriorly Recent Results (from the past 24 hour(s)) GLUCOSE, POC Collection Time: 01/25/20  5:21 PM  
Result Value Ref Range Glucose (POC) 183 (H) 65 - 100 mg/dL GLUCOSE, POC Collection Time: 01/25/20  9:10 PM  
Result Value Ref Range Glucose (POC) 145 (H) 65 - 100 mg/dL GLUCOSE, POC Collection Time: 01/26/20  7:56 AM  
Result Value Ref Range Glucose (POC) 145 (H) 65 - 100 mg/dL GLUCOSE, POC Collection Time: 01/26/20 12:08 PM  
Result Value Ref Range Glucose (POC) 146 (H) 65 - 100 mg/dL CT HEAD WO CONT Final Result IMPRESSION:  Negative for acute intracranial abnormality. Chronic changes. DUPLEX LOWER EXT VENOUS BILAT Final Result IMPRESSION:   
1. No evidence of DVT in either leg. 2. Bilateral knee Baker's cysts. XR CHEST SNGL V Final Result Impression:  Slightly improving multifocal pneumonia. XR CHEST SNGL V Final Result IMPRESSION: Pulmonary edema. CT CHEST W CONT Final Result IMPRESSION:  
1. Large right chest/breast hematoma with findings raising suspicion for active  
bleeding. 2. Mild interstitial opacities suggesting underlying chronic change. 3. Left lower lobe nodule. If the patient is considered high-risk for  
malignancy, follow-up scanning in one year would be recommended. Otherwise no  
further follow-up is required. 4. Indeterminate low density upper pole left renal lesion. CT scanning using a  
renal mass protocol may be beneficial for further evaluation on a nonemergent  
basis. This could also assess the indeterminate right adrenal gland nodule. XR CHEST PORT Final Result Impression: CHF/volume overload however improved over prior exam.  
  
CPT code(s) 41259 XR KNEE RT 3 V Final Result Impression: Moderate to severe medial joint space loss. ASSESSMENT Active Hospital Problems Diagnosis Date Noted  Chronic pulmonary edema 01/25/2020  Fall 01/17/2020  Hematoma of right chest wall 01/17/2020  Coagulopathy ([facility] from Eliquis adn Indocin use 01/17/2020  Acute pain of right knee 01/17/2020  Debility 01/17/2020  Fluid overload 01/17/2020  A-fib ([facility] 01/17/2020  Hypotension 01/17/2020  Anticoagulated 01/17/2020  Chest wall hematoma 01/17/2020  Blood loss anemia 01/17/2020  Acute blood loss anemia 01/17/2020  Subcutaneous hematoma 01/17/2020  Cardiac pacemaker 11/12/2015 4/3/14: 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 
Lasix po Lasix iv x1 
IO monitor Titrate O2 down as needed Insulin scale BP control Dispo: await rehab Signed By: Kendall Gonzlaez MD   
 January 26, 2020

## 2020-01-27 NOTE — PROGRESS NOTES
This CM spoke with pt and pt's daughter regarding discharge planning. Rehab bed offered by [facility] - they remain agreeable with discharge plan and bed offer from rehab. No additional discharge needs at this time. CM to continue to follow.

## 2020-01-27 NOTE — PROGRESS NOTES
END OF SHIFT NOTE: 
 
INTAKE/OUTPUT 
01/26 0701 - 01/27 0700 In: -  
Out: 50 [Urine:50] Voiding: YES Catheter: NO 
Drain:   
 
 
 
 
 
Flatus: Patient does have flatus present. Stool:  0 occurrences. Characteristics: 
Stool Assessment Stool Color: (No BM to assess) Stool Appearance: Formed Stool Amount: Medium Stool Source/Status: Rectum Emesis: 0 occurrences. Characteristics: VITAL SIGNS Patient Vitals for the past 12 hrs: 
 Temp Pulse Resp BP SpO2  
01/27/20 1537 97.7 °F (36.5 °C) 76 16 (!) 82/50 92 % 01/27/20 1109 99.3 °F (37.4 °C) 74 16 95/55 94 % 01/27/20 0712 97.4 °F (36.3 °C) 77 16 93/62 100 % Pain Assessment Pain Intensity 1: 0 (01/27/20 1555) Pain Location 1: Chest 
Pain Intervention(s) 1: Medication (see MAR) Patient Stated Pain Goal: 0 Ambulating No 
 
Shift report given to oncoming nurse at the bedside. Harjit Fontanez

## 2020-01-27 NOTE — PROGRESS NOTES
01/27/20 0304 Oxygen Therapy O2 Sat (%) 96 % Pulse via Oximetry 72 beats per minute O2 Device BIPAP  
FIO2 (%) 60 % Respiratory Respiratory (WDL) WDL Respiratory Pattern Regular Breath Sounds Bilateral Diminished CPAP/BIPAP  
CPAP/BIPAP Start/Stop On Device Mode BIPAP  
$$ Bipap Daily Yes Mask Type and Size Full face; Large Skin Condition nose and cheeks intact PIP Observed 18 cm H20 IPAP (cm H2O) 18 cm H2O  
EPAP (cm H2O) 8 cm H2O Inspiratory Time (sec) 1 seconds Vt Spont (ml) 952 ml Ve Observed (l/min) 16.7 l/min Backup Rate 8 Total RR (Spontaneous) 18 breaths per minute Insp Rise Time (sec) 3 Leak (Estimated) 12 L/min  
Pt's Home Machine No  
Biomedical Check Performed Yes Settings Verified Yes Alarm Settings High Pressure 40 Low Pressure 5 Apnea 20 Low Ve 4 High Rate 40 Low Rate 10

## 2020-01-27 NOTE — PROGRESS NOTES
Hospitalist Progress Note 2020 Admit Date: 2020  3:33 PM  
NAME: Daphnie Rios :  1940 MRN:  [id number] Attending: Monserrat Simeon MD 
PCP:  Alban Blum MD 
 
SUBJECTIVE:  
 
71 yo male with PMH of DM2, AFIB on eliquis, obesity, HFpEF, falls admitted s/p fall with resultant right chest wall hematoma with associated anemia, hypotension and coagulopathy. He required PRBC. Surgery consulted and no intervention needed. He has developed pulmonary edema/ acute hypoxia requiring IV lasix. Cardiology has evaluated and is agreeable to stop eliquis going forward. ECHO  LVEF 20-25%, milds concentric LVH. 
  
Today, has occasional sob, up to 5L O2 
 
PHYSICAL EXAM  
 
Visit Vitals BP (!) 82/50 Comment: notified rn  
Pulse 76 Temp 97.7 °F (36.5 °C) Resp 16 Wt 152.3 kg (335 lb 12.2 oz) SpO2 92% BMI 41.97 kg/m² Temp (24hrs), Av.1 °F (36.7 °C), Min:97.4 °F (36.3 °C), Max:99.3 °F (37.4 °C) Patient Vitals for the past 24 hrs: 
 Temp Pulse Resp BP SpO2  
20 1537 97.7 °F (36.5 °C) 76 16 (!) 82/50 92 % 20 1109 99.3 °F (37.4 °C) 74 16 95/55 94 % 20 0712 97.4 °F (36.3 °C) 77 16 93/62 100 % 20 0433 97.9 °F (36.6 °C) 76 16 104/62 100 % 20 0304     96 % 20 1910 98.1 °F (36.7 °C) 79 18 156/76 97 % Oxygen Therapy O2 Sat (%): 92 % (20 1537) Pulse via Oximetry: 72 beats per minute (20 0304) O2 Device: Nasal cannula (20 0856) O2 Flow Rate (L/min): 5 l/min (20 0856) FIO2 (%): 60 % (20 0304) Intake/Output Summary (Last 24 hours) at 2020 1714 Last data filed at 2020 1345 Gross per 24 hour Intake  Output 375 ml Net -375 ml General: AAO3, moderate distress, morbidly obese Lungs:  CTA Bilaterally anteriorly. Heart:  Irregular rhythm, rate controlled, 1+ leg edema Abdomen: Soft, Non distended, Non tender, Positive bowel sounds Extremities: No cyanosis, 2 + legs edema, R>L Neurologic:  No focal deficits Chest:  Large R chest hematoma noted anterior and posteriorly Recent Results (from the past 24 hour(s)) GLUCOSE, POC Collection Time: 01/26/20  8:56 PM  
Result Value Ref Range Glucose (POC) 149 (H) 65 - 100 mg/dL GLUCOSE, POC Collection Time: 01/27/20  8:29 AM  
Result Value Ref Range Glucose (POC) 144 (H) 65 - 100 mg/dL GLUCOSE, POC Collection Time: 01/27/20 11:15 AM  
Result Value Ref Range Glucose (POC) 185 (H) 65 - 100 mg/dL CT HEAD WO CONT Final Result IMPRESSION:  Negative for acute intracranial abnormality. Chronic changes. DUPLEX LOWER EXT VENOUS BILAT Final Result IMPRESSION:   
1. No evidence of DVT in either leg. 2. Bilateral knee Baker's cysts. XR CHEST SNGL V Final Result Impression:  Slightly improving multifocal pneumonia. XR CHEST SNGL V Final Result IMPRESSION: Pulmonary edema. CT CHEST W CONT Final Result IMPRESSION:  
1. Large right chest/breast hematoma with findings raising suspicion for active  
bleeding. 2. Mild interstitial opacities suggesting underlying chronic change. 3. Left lower lobe nodule. If the patient is considered high-risk for  
malignancy, follow-up scanning in one year would be recommended. Otherwise no  
further follow-up is required. 4. Indeterminate low density upper pole left renal lesion. CT scanning using a  
renal mass protocol may be beneficial for further evaluation on a nonemergent  
basis. This could also assess the indeterminate right adrenal gland nodule. XR CHEST PORT Final Result Impression: CHF/volume overload however improved over prior exam.  
  
CPT code(s) 11943 XR KNEE RT 3 V Final Result Impression: Moderate to severe medial joint space loss. ASSESSMENT Dx: 1- Chest hematoma, s/p PRBC 3U transfusion, looks more prominent today 2- Ac on ch diastolic CHF w/ LL edema luis m, negative V. Doppler. IO ->7L 
3- Hx of afib, was on Eliquis 4- HTN and DM, controlled Rx: 
Lasix po Lasix iv x1 again IO monitor Titrate O2 down as needed AM lab Insulin scale BP control Dispo: await rehab Signed By: Cheryl Conner MD   
 January 27, 2020

## 2020-01-27 NOTE — PROGRESS NOTES
Problem: Mobility Impaired (Adult and Pediatric) Goal: *Acute Goals and Plan of Care (Insert Text) Description Goals: 
(1.)Mr. Merna Lombardo will move from supine to sit and sit to supine , scoot up and down and roll side to side with CONTACT GUARD ASSIST within 5 treatment day(s). (2.)Mr. Merna Lombardo will transfer from bed to chair and chair to bed with MINIMAL ASSIST using the least restrictive device within 5 treatment day(s). (3.)Mr. Merna Lombardo will ambulate with MINIMAL ASSIST for 25-50 feet with the least restrictive device within 5 treatment day(s). PHYSICAL THERAPY: Daily Note 1/27/2020 INPATIENT: PT Visit Days : 4 [place] / [place] B / Product Type: Medicare /   
  
NAME/AGE/GENDER: Corinna Vanegas is a 70 y.o. male PRIMARY DIAGNOSIS: Chest wall hematoma [S20.219A] Hypotension [I95.9] A-fib ([address] [I48.91] Anticoagulated [Z79.01] Acute blood loss anemia [D62] Subcutaneous hematoma [T14. Ragena Horse Fall Fall ICD-10: Treatment Diagnosis:  
 · Generalized Muscle Weakness (M62.81) · Difficulty in walking, Not elsewhere classified (R26.2) · Repeated Falls (R29.6) Precaution/Allergies: 
Clindamycin; Demerol [meperidine]; Losartan-hydrochlorothiazide; and Pcn [penicillins] ASSESSMENT:  
 
Pt is supine in bed on arrival. Assisted nursing assistant to change brief and linens. Pt required mod assist from rolling and mod assist to stay on side for cleaning. Supine to sit with mod assist x2. Pt has very forward flexed posture and unable to hold up his head while sitting. He declined standing, even with a RW in front of him. He states he is very weak today. Pt returned to supine with max x2. Exercises performed supine per chart below. Pt left with needs in reach and pillows in place for comfort. This section established at most recent assessment PROBLEM LIST (Impairments causing functional limitations): 1. Decreased Strength 2. Decreased ADL/Functional Activities 3. Decreased Transfer Abilities 4. Decreased Ambulation Ability/Technique 5. Decreased Activity Tolerance INTERVENTIONS PLANNED: (Benefits and precautions of physical therapy have been discussed with the patient.) 1. Bed Mobility 2. Therapeutic Activites 3. Therapeutic Exercise/Strengthening 4. Transfer Training TREATMENT PLAN: Frequency/Duration: 3 times a week for duration of hospital stay Rehabilitation Potential For Stated Goals: Fair REHAB RECOMMENDATIONS (at time of discharge pending progress):   
Placement: It is my opinion, based on this patient's performance to date, that Mr. aFrhan Chauhan may benefit from intensive therapy at a [address] [place] [address] after discharge due to the functional deficits listed above that are likely to improve with skilled rehabilitation and concerns that he/she may be unsafe to be unsupervised at home due to weakness and history of falls. Equipment:  
? None at this time HISTORY:  
History of Present Injury/Illness (Reason for Referral): Amy Nguyễn is a 70 y.o. male who on Eliquis for afib who came to the ER on 1/17/20 after a fall 1-2 hrs prior to arrival. 
He also takes Indocin He had swelling and hematoma of the right upper anterior chest 
He fell from a standing position while using a Rollator and landed on the device and hit is right anterior chest.  
He developed progressive severe swelling and hematoma He is on Eliquis for atrial fibrillation.  Over the past 2 hours since the fall the hematoma has expanded to a small melon. Very painful and firm. No SOB. Denies any hip or back pain. Also has associated right knee pain No LOC or head trauma Past Medical History/Comorbidities:  
Mr. Farhan Chauhan  has a past medical history of Arrhythmia, Arthritis, CAD (coronary artery disease), Cardiac pacemaker (11/12/2015), Chronic diastolic heart failure ([facility] (11/12/2015), Chronic pain, Diabetes ([address] Diabetes mellitus type II, uncontrolled ([facility] Dyspnea/shortness of breath (11/12/2015), GERD (gastroesophageal reflux disease), Heart failure ([address] HTN - uncontrolled, malignant (8/8/2016), Hyperlipidemia (11/12/2015), Hypertension, Malaise/fatigue/weakness/tiredness (11/12/2015), Mixed hyperlipidemia, Morbid obesity ([facility] Orthostatic hypotension (11/12/2015), Other ill-defined conditions(799.89), Paroxysmal atrial fibrillation ([facility] [address] Permanent atrial fibrillation ([address] (11/12/2015), Tendon injury, and Unspecified sleep apnea. Mr. Yadira Olivares  has a past surgical history that includes hx other surgical; hx hernia repair; pr left heart cath,percutaneous (3/31/2014); and hx pacemaker. Social History/Living Environment:  
Home Environment: Private residence # Steps to Enter: 1 One/Two Story Residence: One story Living Alone: No 
Support Systems: Spouse/Significant Other/Partner Patient Expects to be Discharged to[de-identified] Unknown Current DME Used/Available at Home: Daniela Earing, rollator, Commode, bedside Prior Level of Function/Work/Activity: 
Patient reports he has been ambulating with a rollator at home and uses his power scooter but her also reports several falls. Number of Personal Factors/Comorbidities that affect the Plan of Care: 3+: HIGH COMPLEXITY EXAMINATION:  
Most Recent Physical Functioning:  
Gross Assessment: 
  
         
  
Posture: 
  
Balance: 
Sitting: Impaired Sitting - Static: Good (unsupported) Sitting - Dynamic: Fair (occasional) Standing: Impaired Bed Mobility: 
Supine to Sit:Maximum assistance;Assist x2 Scooting: Maximum assistance;Assist x2 Wheelchair Mobility: 
  
Transfers: 
  
Gait: 
  
   
  
Body Structures Involved: 1. Muscles Body Functions Affected: 1. Neuromusculoskeletal 
2. Movement Related Activities and Participation Affected: 1. General Tasks and Demands 2. Mobility 3. Self Care Number of elements that affect the Plan of Care: 3: MODERATE COMPLEXITY CLINICAL PRESENTATION:  
Presentation: Evolving clinical presentation with changing clinical characteristics: MODERATE COMPLEXITY CLINICAL DECISION MAKING:  
[facility] AM-PAC 6 Clicks Basic Mobility Inpatient Short Form How much difficulty does the patient currently have. .. Unable A Lot A Little None 1. Turning over in bed (including adjusting bedclothes, sheets and blankets)? [] 1   [x] 2   [] 3   [] 4  
2. Sitting down on and standing up from a chair with arms ( e.g., wheelchair, bedside commode, etc.)   [] 1   [x] 2   [] 3   [] 4  
3. Moving from lying on back to sitting on the side of the bed? [] 1   [x] 2   [] 3   [] 4 How much help from another person does the patient currently need. .. Total A Lot A Little None 4. Moving to and from a bed to a chair (including a wheelchair)? [] 1   [x] 2   [] 3   [] 4  
5. Need to walk in hospital room? [] 1   [x] 2   [] 3   [] 4  
6. Climbing 3-5 steps with a railing? [x] 1   [] 2   [] 3   [] 4  
© 2007, Trustees of [facility], under license to [employer]. All rights reserved Score:  Initial: 11 Most Recent: X (Date: -- ) Interpretation of Tool:  Represents activities that are increasingly more difficult (i.e. Bed mobility, Transfers, Gait). Medical Necessity:    
· Patient is expected to demonstrate progress in strength and functional technique to decrease assistance required with bed mobility, SPT and short distance gait with rollator. Reason for Services/Other Comments: 
· Patient continues to require skilled intervention due to medical complications. Use of outcome tool(s) and clinical judgement create a POC that gives a: Questionable prediction of patient's progress: MODERATE COMPLEXITY  
  
 
 
 
TREATMENT:  
(In addition to Assessment/Re-Assessment sessions the following treatments were rendered) Pre-treatment Symptoms/Complaints:  fatigue Pain: Initial:  
   Post Session:  0/10 Therapeutic Activity: (    25 minutes): Therapeutic activities including Bed transfers to improve mobility, strength and balance. Required minimal   to promote dynamic balance in sitting. Therapeutic Exercise: (15 Minutes):  Exercises per grid below to improve mobility, strength and balance. Required minimal verbal cues to promote proper body alignment, promote proper body posture and promote proper body mechanics. Progressed repetitions as indicated. Date: 
1/22/20 Date: 
1/23/20 Date: 
1/24/20 Date:  
1/27/20 Activity/Exercise Parameters Parameters Parameters Ankle pumps 2 x 10 2 x 10 2x10 AB 10 Heel slides in supine 10 B 2 x 10 2x10 AB 10 AA Hip IR/ER in supine 10 B  2x10 AB Seated knee extension 2 x 10 2 x 10 Seated marching hip flexion  2 x 10 Hip ab/ad supine   2x10 AB 10 AA Quad sets    10  
glute sets    10 Braces/Orthotics/Lines/Etc:  
· IV 
· O2 via nasal cannula Treatment/Session Assessment:   
· Response to Treatment:  Pt very weak today. · Interdisciplinary Collaboration:  
o Physical Therapy Assistant 
o Registered Nurse · After treatment position/precautions:  
o Supine in bed  
o Bed/Chair-wheels locked 
o Bed in low position 
o Call light within reach 
o RN notified · Compliance with Program/Exercises: Will assess as treatment progresses · Recommendations/Intent for next treatment session: \"Next visit will focus on advancements to more challenging activities and reduction in assistance provided\". Total Treatment Duration: PT Patient Time In/Time Out Time In: 1320 Time Out: 1400 Higinio Mcrae PTA

## 2020-01-27 NOTE — PROGRESS NOTES
END OF SHIFT NOTE: 
 
INTAKE/OUTPUT 
01/26 0701 - 01/27 0700 In: -  
Out: 50 [Urine:50] Voiding: YES Catheter: NO 
Drain:   
 
 
 
 
 
Flatus: Patient does have flatus present. Stool:  0 occurrences. Characteristics: 
Stool Assessment Stool Color: (No BM to assess) Stool Appearance: Formed Stool Amount: Medium Stool Source/Status: Rectum Emesis: 0 occurrences. Characteristics: VITAL SIGNS Patient Vitals for the past 12 hrs: 
 Temp Pulse Resp BP SpO2  
01/27/20 0433 97.9 °F (36.6 °C) 76 16 104/62 100 % 01/27/20 0304     96 % 01/26/20 1910 98.1 °F (36.7 °C) 79 18 156/76 97 % Pain Assessment Pain Intensity 1: 2 (01/26/20 2234) Pain Location 1: Chest 
Pain Intervention(s) 1: Rest, Repositioned Patient Stated Pain Goal: 3 Ambulating No 
 
Shift report given to oncoming nurse at the bedside.  
 
Radha Manzano RN

## 2020-01-28 NOTE — PROGRESS NOTES
PT note: Attempted to see patient this PM, RN requesting HOLD patient due to medical status. Will check back on the patient at a later date/time as schedule allows.   
 
Angélica Burnham, PTA

## 2020-01-28 NOTE — PROGRESS NOTES
Patient's temp 102.8 and more lethargic this shift. Notified Dr. Nafisa Zazueta via [email or web address]. New orders received. See MAR.

## 2020-01-28 NOTE — PROGRESS NOTES
. 
END OF SHIFT NOTE: 
 
INTAKE/OUTPUT 
01/27 0701 - 01/28 0700 In: -  
Out: 515 [[id number]] Voiding: YES Catheter: NO 
Drain:   
 
 
 
 
 
Flatus: Patient does have flatus present. Emesis: 0 occurrences. Characteristics: VITAL SIGNS Patient Vitals for the past 12 hrs: 
 Temp Pulse Resp BP SpO2  
01/28/20 0421 98.4 °F (36.9 °C) 77 20 92/60 90 % 01/28/20 0207 98.6 °F (37 °C)      
01/28/20 0029  69  102/69   
01/27/20 2352 (!) 102.8 °F (39.3 °C) 93 20 (!) 96/33 93 % 01/27/20 2146     96 % 01/27/20 1846 98.6 °F (37 °C) 83 18 113/74 99 % MD notified of VS and temp Pain Assessment Pain Intensity 1: 0 (01/27/20 2020) Pain Location 1: Chest 
Pain Intervention(s) 1: Medication (see MAR) Patient Stated Pain Goal: 0 Ambulating No 
 
Shift report given to oncoming nurse at the bedside.  
 
Addie Light RN

## 2020-01-28 NOTE — PROGRESS NOTES
TRANSFER - IN REPORT: 
 
[facility]  being received from [phone]  (unit) for routine progression of care Report consisted of patients Situation, Background, Assessment and  
Recommendations(SBAR). Information from the following report(s) SBAR, Kardex, Intake/Output and MAR was reviewed with the receiving nurse. Opportunity for questions and clarification was provided. Assessment completed upon patients arrival to unit and care assumed.

## 2020-01-28 NOTE — PROGRESS NOTES
Patient placed on BiPAP for use QHS. No distress or discomfort currently noted. Will continue to monitor. 01/27/20 2146 Oxygen Therapy O2 Sat (%) 96 % Pulse via Oximetry 70 beats per minute O2 Device BIPAP  
FIO2 (%) 60 % CPAP/BIPAP  
CPAP/BIPAP Start/Stop On Device Mode BIPAP;S/T Mask Type and Size Full face; Large Skin Condition Intact PIP Observed 19 cm H20 IPAP (cm H2O) 18 cm H2O  
EPAP (cm H2O) 8 cm H2O Inspiratory Time (sec) 1 seconds Vt Spont (ml) 785 ml Ve Observed (l/min) 16.1 l/min Backup Rate 8 Total RR (Spontaneous) 20 breaths per minute Insp Rise Time (sec) 3 Leak (Estimated) 21 L/min  
Pt's Home Machine No  
Biomedical Check Performed Yes Settings Verified Yes Alarm Settings High Pressure 40 Low Pressure 5 Apnea 20 Low Ve 4 High Rate 40 Low Rate 10

## 2020-01-28 NOTE — PROGRESS NOTES
Spiritual Care Visit, follow up visit.  responded to Rapid Response. Left after asking Clinical Coordinator to call if chaplains are needed. Visit by Eleanor Campbell, Staff .  Susanna., Tani.ADELAIDA., B.A.

## 2020-01-28 NOTE — PROGRESS NOTES
Rapid response called, pt is hypotensive and less responsive O/E: arousable, soft abdomen, hypotensive and tachycardic Dx: 
1- Septic shock & lactic acidosis due to complicated UTI in male, hx of BPH 
2- Ch systolic CHF, ef 21% and dCHF, stable 3- s/p Chest hematoma due to fall, was on Eliquis, s/p PRBC 3U transfusion, stable 4- IDDM, controlled Rx: 
Cefepime+ Vancomycin IV Solucortef iv Levophed if needed Cautious IVF Follow blood and urine CS 
AM lab 
ICU I spoke to daughter, updated. Full code as discussed Critical care time 45 min Signed By: David Blanco MD   
 January 28, 2020

## 2020-01-28 NOTE — PROGRESS NOTES
TRANSFER - OUT REPORT: 
 
Verbal report given to Jacque HINSON on Ghazal Dick  being transferred to ICU for change in patient condition(septic) Report consisted of patients Situation, Background, Assessment and  
Recommendations(SBAR). Information from the following report(s) SBAR, Intake/Output, MAR and Recent Results was reviewed with the receiving nurse. Lines:  
Peripheral IV 01/27/20 Right Forearm (Active) Site Assessment Clean, dry, & intact 1/28/2020  8:20 AM  
Phlebitis Assessment 0 1/28/2020  8:20 AM  
Infiltration Assessment 0 1/28/2020  8:20 AM  
Dressing Status Clean, dry, & intact 1/28/2020  8:20 AM  
Dressing Type Tape;Transparent 1/28/2020  8:20 AM  
Hub Color/Line Status Flushed;Patent 1/28/2020  2:00 AM  
   
Peripheral IV 01/28/20 Right Hand (Active) Opportunity for questions and clarification was provided. Patient transported with: 
 O2 @ 3 liters

## 2020-01-28 NOTE — PROGRESS NOTES
OT note: Attempted to see patient this PM, RN requesting HOLD patient due to medical status. Will check back on the patient at a later date/time as schedule allows. Cy Dawns

## 2020-01-28 NOTE — PROGRESS NOTES
Patient arrived to ICU, transferred to bed and placed on monitor. Hypotensive with MAP 64, levophed to be started. Paced on monitor. SpO2 98% on 3L NC. Patient is drowsy but confused. Dual skin assessment with Tatiana RN: Sacrum purple/red and nonblanchable with small open area to buttocks/gluteal fold- allevyn changed and patient turned. Scattered bruising to all extremities, markedly between legs. Excoriation to abdominal folds- nystatin powder applied and pillow cases applied. Heels intact and floated on pillow. Hematoma/bruising to R chest (present this admission) with soft edges.

## 2020-01-28 NOTE — PROGRESS NOTES
Hospitalist Progress Note 2020 Admit Date: 2020  3:33 PM  
NAME: Delphina Apgar :  1940 MRN:  [id number] Attending: Dimas Hathaway MD 
PCP:  Cathy James MD 
 
SUBJECTIVE:  
 
71 yo male with PMH of DM2, AFIB on eliquis, obesity, HFpEF, falls admitted s/p fall with resultant right chest wall hematoma with associated anemia, hypotension and coagulopathy. He required PRBC. Surgery consulted and no intervention needed. He has developed pulmonary edema/ acute hypoxia requiring IV lasix. Cardiology has evaluated and is agreeable to stop eliquis going forward. ECHO  LVEF 20-25%, milds concentric LVH. 
  
Today, spiked fever overnight, more confused, low normal BP 
 
PHYSICAL EXAM  
 
Visit Vitals BP 93/53 (BP 1 Location: Left arm, BP Patient Position: Supine) Pulse 83 Temp 97.4 °F (36.3 °C) Resp 30 Wt 152.3 kg (335 lb 12.2 oz) SpO2 90% BMI 41.97 kg/m² Temp (24hrs), Av.9 °F (37.2 °C), Min:97.4 °F (36.3 °C), Max:102.8 °F (39.3 °C) Patient Vitals for the past 24 hrs: 
 Temp Pulse Resp BP SpO2  
20 1104 97.4 °F (36.3 °C) 83 30 93/53 90 % 20 0706 98.6 °F (37 °C) 80 18 103/51 96 % 20 0421 98.4 °F (36.9 °C) 77 20 92/60 90 % 20 0207 98.6 °F (37 °C)      
20 0029  69  102/69   
20 2352 (!) 102.8 °F (39.3 °C) 93 20 (!) 96/33 93 % 20 2146     96 % 20 1846 98.6 °F (37 °C) 83 18 113/74 99 % 20 1600    98/55   
20 1537 97.7 °F (36.5 °C) 76 16 (!) 82/50 92 % Oxygen Therapy O2 Sat (%): 90 % (20 1104) Pulse via Oximetry: 70 beats per minute (20) O2 Device: Hi flow nasal cannula (20) O2 Flow Rate (L/min): 3 l/min (20) FIO2 (%): 60 % (20) Intake/Output Summary (Last 24 hours) at 2020 1447 Last data filed at 2020 1300 Gross per 24 hour Intake 555 ml Output  Net 555 ml General: AAO to place only, moderate distress, morbidly obese Lungs:  CTA Bilaterally anteriorly. Heart:  Irregular rhythm, rate controlled, 1+ leg edema Abdomen: Soft, Non distended, Non tender, Positive bowel sounds Extremities: No cyanosis, 2 + legs edema, R>L Neurologic:  No focal deficits Chest:  Large R chest hematoma noted anterior and posteriorly Recent Results (from the past 24 hour(s)) GLUCOSE, POC Collection Time: 01/27/20  5:26 PM  
Result Value Ref Range Glucose (POC) 159 (H) 65 - 100 mg/dL GLUCOSE, POC Collection Time: 01/27/20  9:49 PM  
Result Value Ref Range Glucose (POC) 194 (H) 65 - 100 mg/dL LACTIC ACID Collection Time: 01/28/20 12:34 AM  
Result Value Ref Range Lactic acid 2.0 0.4 - 2.0 MMOL/L  
URINALYSIS W/ RFLX MICROSCOPIC Collection Time: 01/28/20  4:01 AM  
Result Value Ref Range Color YELLOW Appearance CLEAR Specific gravity 1.009 1.001 - 1.023    
 pH (UA) 6.0 5.0 - 9.0 Protein 30 (A) NEG mg/dL Glucose NEGATIVE  mg/dL Ketone NEGATIVE  NEG mg/dL Bilirubin NEGATIVE  NEG Blood NEGATIVE  NEG Urobilinogen 0.2 0.2 - 1.0 EU/dL Nitrites NEGATIVE  NEG Leukocyte Esterase SMALL (A) NEG    
 WBC 20-50 0 /hpf  
 RBC 0-3 0 /hpf Epithelial cells 0 0 /hpf Bacteria 4+ (H) 0 /hpf Casts 0-3 0 /lpf POTASSIUM Collection Time: 01/28/20  4:51 AM  
Result Value Ref Range Potassium 4.6 3.5 - 5.1 mmol/L  
HEMOGLOBIN Collection Time: 01/28/20  4:51 AM  
Result Value Ref Range HGB 10.2 (L) 13.6 - 17.2 g/dL GLUCOSE, POC Collection Time: 01/28/20  7:30 AM  
Result Value Ref Range Glucose (POC) 131 (H) 65 - 100 mg/dL GLUCOSE, POC Collection Time: 01/28/20 11:13 AM  
Result Value Ref Range Glucose (POC) 131 (H) 65 - 100 mg/dL XR CHEST SNGL V Final Result IMPRESSION: Improved bilateral lung edema or infiltrates. CT HEAD WO CONT Final Result IMPRESSION:  Negative for acute intracranial abnormality. Chronic changes. DUPLEX LOWER EXT VENOUS BILAT Final Result IMPRESSION:   
1. No evidence of DVT in either leg. 2. Bilateral knee Baker's cysts. XR CHEST SNGL V Final Result Impression:  Slightly improving multifocal pneumonia. XR CHEST SNGL V Final Result IMPRESSION: Pulmonary edema. CT CHEST W CONT Final Result IMPRESSION:  
1. Large right chest/breast hematoma with findings raising suspicion for active  
bleeding. 2. Mild interstitial opacities suggesting underlying chronic change. 3. Left lower lobe nodule. If the patient is considered high-risk for  
malignancy, follow-up scanning in one year would be recommended. Otherwise no  
further follow-up is required. 4. Indeterminate low density upper pole left renal lesion. CT scanning using a  
renal mass protocol may be beneficial for further evaluation on a nonemergent  
basis. This could also assess the indeterminate right adrenal gland nodule. XR CHEST PORT Final Result Impression: CHF/volume overload however improved over prior exam.  
  
CPT code(s) 64841 XR KNEE RT 3 V Final Result Impression: Moderate to severe medial joint space loss. ASSESSMENT Dx: 
1- Chest hematoma, s/p PRBC 3U transfusion, looks more prominent today 2- Ac on ch diastolic CHF w/ LL edema luis m, negative V. Doppler. IO ->7L 
3- Hx of afib, was on Eliquis 4- HTN and DM, controlled 5- Ac encephalopathy due to complicated UTI in male, hx of BPH Rx: 
Rocephin iv Follow cultures Flomax IO monitor Titrate O2 down as needed AM lab Insulin scale BP control Dispo: await rehab Signed By: Sukhjinder Harris MD   
 January 28, 2020

## 2020-01-28 NOTE — PROGRESS NOTES
Pharmacokinetic Consult to Pharmacist 
 
Sebastian Boyd is a 70 y.o. male being treated for Sepsis: UTI with Vancomycin and Ceftriaxone. Height: 6' (182.9 cm)  Weight: 152.3 kg (335 lb 12.2 oz) Lab Results Component Value Date/Time BUN 36 (H) 01/28/2020 03:34 PM  
 Creatinine 3.45 (H) 01/28/2020 03:34 PM  
 WBC 25.4 (H) 01/28/2020 03:34 PM  
 Procalcitonin <0.05 12/21/2019 10:05 PM  
 Lactic acid 2.9 (HH) 01/28/2020 03:34 PM  
 Lactic Acid (POC) 0.52 12/21/2019 10:54 PM  
  
Estimated Creatinine Clearance: 27.3 mL/min (A) (based on SCr of 3.45 mg/dL (H)). Lab Results Component Value Date/Time Creatinine 3.45 (H) 01/28/2020 03:34 PM  
 Creatinine 1.40 01/25/2020 05:11 AM  
 Creatinine 1.63 (H) 01/24/2020 05:50 AM  
 
 
CULTURES: 
Pending Day 1 of vancomycin. Goal trough is 15-20. Will start the patient on Vancomycin 2500mg IV x 1 dose followed with intermittent daily dosing due to his unstable renal function. Will continue to follow patient. Thank you, K. Alfonso Spurling, DrissD

## 2020-01-29 PROBLEM — R78.81 GRAM-NEGATIVE BACTEREMIA: Status: ACTIVE | Noted: 2020-01-01

## 2020-01-29 PROBLEM — N39.0 UTI (URINARY TRACT INFECTION): Status: ACTIVE | Noted: 2020-01-01

## 2020-01-29 PROBLEM — A41.9 SEPTIC SHOCK (HCC): Status: ACTIVE | Noted: 2020-01-01

## 2020-01-29 PROBLEM — D72.829 LEUKOCYTOSIS: Status: ACTIVE | Noted: 2020-01-01

## 2020-01-29 PROBLEM — R65.21 SEPTIC SHOCK (HCC): Status: ACTIVE | Noted: 2020-01-01

## 2020-01-29 NOTE — CONSULTS
LEAPFROG PROTOCOL NOTE Polly Moh 1/29/2020 The patient is currently in the critical care setting managed by Dr. Vinayak Ramachandran with recent fall and chest hematoma. The patient's chart is reviewed and the patient is discussed with the staff. On room air and weaned off pressor support. Patient is currently hemodynamically stable. Patient has no needs identified for Intensivist management in the critical care setting at this time. Please notify us if can be of assistance. No charge billed to the patient. Thank you.  
 
Anika Chopra NP

## 2020-01-29 NOTE — PROGRESS NOTES
Occupational Therapy Note: 
Therapist is discharging patient from OT at this time due to decline in medical status and transfer to ICU. Please reconsult OT when MD deems patient appropriate for continued services. Thank you.  
Bj Strong, OTR/L

## 2020-01-29 NOTE — PROGRESS NOTES
Chart reviewed after tx to ICU post RR. Screen completed by CM prior to tx with d/c POC for STR at [facility] PPD already in place. Will need PT/OT when medically stable. CM following for any assist and change in d/c POC.

## 2020-01-29 NOTE — PROGRESS NOTES
Bedside and Verbal shift change report given to Doctor Reyes Mayen (oncoming nurse) by Bijal Cope (offgoing nurse). Report included the following information SBAR, Kardex, ED Summary, Intake/Output, MAR, Recent Results, Cardiac Rhythm Paced and Alarm Parameters .

## 2020-01-29 NOTE — PROGRESS NOTES
Hospitalist Note Admit Date:  2020  3:33 PM  
Name:  Sarmad Mandujano Age:  70 y.o. 
:  1940 MRN:  [id number] PCP:  Rd Kang MD 
Treatment Team: Attending Provider: Brent Quintana MD; Utilization Review: Mamta Li RN; Care Manager: Aceelissa Schirmer; Utilization Review: Danielle Martinez RN; Consulting Provider: Robert Naqvi MD 
 
HPI/Subjective:  
Mr. Abilio Jensen is a 69 yo male with PMH of DM2, AFIB on eliquis, obesity, HFpEF, falls admitted with a fall with resultant right chest wall hematoma with associated anemia, hypotension and coagulopathy. He required PRBC. Surgery consulted and no intervention needed. He has developed pulmonary edema/ acute hypoxia requiring IV lasix. Cardiology has evaluated and is agreeable to stop eliquis going forward. ECHO  LVEF 20-25%, milds concentric LVH. RR called on  for HoTN and confusion, worked up and treated for sepsis 2/2 urinary source and transferred to ICU. 
 
: Looks well, resting in bed, Levo at 7/hr, denies pain, comfortable. No other complaints Objective:  
 
Patient Vitals for the past 24 hrs: 
 Temp Pulse Resp BP SpO2  
20 0644  78 26 109/58 95 % 20 0630  79 (!) 32 108/59 96 % 20 0616  78 (!) 33 120/59 98 % 20 0609  76 26  98 % 20 0600  78  94/57 99 % 20 0544  78  108/57 98 % 20 0540  79 25  97 % 20 0529  77 26 117/56 96 % 20 0515  77  99/54 95 % 20 0511  78 26  97 % 20 0502  76 24  96 % 20 0500  76 28 109/56 98 % 20 0444  77  103/59 96 % 20 0429  77  92/51 96 % 20 0415  76 (!) 33 99/57 96 % 20 0400  78 (!) 33 99/57 97 % 20 0350     96 % 20 0345  83 29  93 % 20 0344  81  102/60 95 % 20 0329 98.4 °F (36.9 °C) 80 25 101/63 94 % 20 0315  80 (!) 35 100/55 96 % 01/29/20 0300  77 (!) 35 104/56 96 % 01/29/20 0244  75 (!) 37 (!) 83/54 94 % 01/29/20 0231  75  (!) 76/52 93 % 01/29/20 0215  75 25 (!) 81/51 99 % 01/29/20 0201  75 30 (!) 78/51 97 % 01/29/20 0144  77 26 113/63 95 % 01/29/20 0129  76 26 113/62 97 % 01/29/20 0115  76 26 112/63 97 % 01/29/20 0100  78 25 115/67 96 % 01/29/20 0044  78 (!) 31 113/64 95 % 01/29/20 0029  77 26 115/61 96 % 01/29/20 0015  91 23 118/68 98 % 01/28/20 2344 99.7 °F (37.6 °C) 78 25 130/59 97 % 01/28/20 2329  78 24 144/67 96 % 01/28/20 2315  78 28 117/61 95 % 01/28/20 2300  78 25 107/57 93 % 01/28/20 2244  78 28 109/58 95 % 01/28/20 2229  79 25 92/54 94 % 01/28/20 2215  80 28 101/55 97 % 01/28/20 2210     96 % 01/28/20 2200  79 (!) 31 102/54 100 % 01/28/20 2149  79 26 112/57 100 % 01/28/20 2144  77 (!) 36 116/56 100 % 01/28/20 2139  79 (!) 33 122/57 99 % 01/28/20 2135  78 (!) 36 126/61 100 % 01/28/20 2129  79 (!) 38 114/56 100 % 01/28/20 2124  80 26 108/55 98 % 01/28/20 2119  82 (!) 33 100/60 96 % 01/28/20 2110  79 (!) 34 98/53 97 % 01/28/20 2100  79 (!) 35 98/55 96 % 01/28/20 2049  80 (!) 32 96/53 97 % 01/28/20 2029  79 30 94/54 98 % 01/28/20 2019  79 (!) 31 95/53 99 % 01/28/20 2010  79 (!) 32 99/54 99 % 01/28/20 2000  80 (!) 31 92/52 98 % 01/28/20 1949  80 28 (!) 86/54 98 % 01/28/20 1939  81 (!) 33 97/52 98 % 01/28/20 1931  79 28 (!) 83/51 98 % 01/28/20 1919  79 (!) 35 90/51 98 % 01/28/20 1910 99 °F (37.2 °C) 83 (!) 35 90/54 98 % 01/28/20 1900  81 (!) 33 (!) 83/50 98 % 01/28/20 1855  79 (!) 34 (!) 82/50 98 % 01/28/20 1849  81  (!) 79/49 98 % 01/28/20 1844  80 (!) 34 (!) 84/51 (!) 83 % 01/28/20 1843  82 (!) 36  (!) 73 % 01/28/20 1842  80 (!) 37  93 % 01/28/20 1841  79 (!) 36  (!) 75 % 01/28/20 1840  79 (!) 34  92 % 01/28/20 1839  79 (!) 35 (!) 76/47 (!) 79 % 01/28/20 1838  79 (!) 35  93 % 01/28/20 1837  79 (!) 35  93 % 01/28/20 1836  80 (!) 37  97 % 01/28/20 1835  83 (!) 37  95 % 01/28/20 1834  80 (!) 38 (!) 77/47 98 % 01/28/20 1829  79 (!) 35 (!) 78/47 96 % 01/28/20 1824  78 (!) 35 (!) 73/46 98 % 01/28/20 1819  79 (!) 34 (!) 70/41 96 % 01/28/20 1814  79 (!) 33 (!) 70/42 99 % 01/28/20 1810  78  (!) 69/43 99 % 01/28/20 1800  80  (!) 72/42 98 % 01/28/20 1755 99.8 °F (37.7 °C) 80 23 (!) 86/52 97 % 01/28/20 1730  80  (!) 79/50   
01/28/20 1656 100.2 °F (37.9 °C) 79 22 (!) 82/43 90 % 01/28/20 1625  81  (!) 85/48   
01/28/20 1610 (!) 100.5 °F (38.1 °C) 83  (!) 84/51 94 % 01/28/20 1535 (!) 101.7 °F (38.7 °C) (!) 120  (!) 75/52   
01/28/20 1529  (!) 120  (!) 68/40   
01/28/20 1520  82  (!) 74/43 98 % 01/28/20 1455 (!) 101.9 °F (38.8 °C)      
01/28/20 1104 97.4 °F (36.3 °C) 83 30 93/53 90 % Oxygen Therapy O2 Sat (%): 95 % (01/29/20 0644) Pulse via Oximetry: 78 beats per minute (01/29/20 0644) O2 Device: Hi flow nasal cannula (01/29/20 0600) O2 Flow Rate (L/min): 3 l/min (01/29/20 0600) FIO2 (%): 60 % (01/27/20 2146) Estimated body mass index is 45.54 kg/m² as calculated from the following: 
  Height as of this encounter: 6' (1.829 m). Weight as of this encounter: 152.3 kg (335 lb 12.2 oz). Intake/Output Summary (Last 24 hours) at 1/29/2020 4059 Last data filed at 1/29/2020 6733 Gross per 24 hour Intake 1128.93 ml Output 1425 ml Net -296.07 ml *Note that automatically entered I/Os may not be accurate; dependent on patient compliance with collection and accurate  by Positive[employer]. General:    Well nourished. Alert. Obese. CV:   RRR. No murmur, rub, or gallop. Right subclavian CVC. Lungs:   CTAB. No wheezing, rhonchi, or rales. 2L NC O2. Abdomen:   Soft, nontender, nondistended. Extremities: Warm and dry. No cyanosis or edema. Skin:     No rashes or jaundice.  Right chest wall hematoma with overlying ecchymoses. Neuro:  No gross focal deficits Data Review: 
I have reviewed all labs, meds, and studies from the last 24 hours: 
 
Recent Results (from the past 24 hour(s)) GLUCOSE, POC Collection Time: 01/28/20 11:13 AM  
Result Value Ref Range Glucose (POC) 131 (H) 65 - 100 mg/dL GLUCOSE, POC Collection Time: 01/28/20  3:17 PM  
Result Value Ref Range Glucose (POC) 187 (H) 65 - 100 mg/dL POC G3 Collection Time: 01/28/20  3:28 PM  
Result Value Ref Range Device: BIPAP    
 FIO2 (POC) 60 % pH (POC) 7.412 7.35 - 7.45    
 pCO2 (POC) 39.4 35 - 45 MMHG  
 pO2 (POC) 197 (H) 75 - 100 MMHG  
 HCO3 (POC) 25.1 22 - 26 MMOL/L  
 sO2 (POC) 100 (H) 95 - 98 % Base excess (POC) 0 mmol/L Allens test (POC) YES Site RIGHT RADIAL Specimen type (POC) ARTERIAL Performed by Dragan   
 CO2, POC 26 MMOL/L Critical value read back 00:01 Exhaled minute volume 22.00 L/min COLLECT TIME 1,519 METABOLIC PANEL, BASIC Collection Time: 01/28/20  3:34 PM  
Result Value Ref Range Sodium 136 136 - 145 mmol/L Potassium 5.9 (H) 3.5 - 5.1 mmol/L Chloride 101 98 - 107 mmol/L  
 CO2 25 21 - 32 mmol/L Anion gap 10 7 - 16 mmol/L Glucose 183 (H) 65 - 100 mg/dL BUN 36 (H) 8 - 23 MG/DL Creatinine 3.45 (H) 0.8 - 1.5 MG/DL  
 GFR est AA 22 (L) >60 ml/min/1.73m2 GFR est non-AA 18 (L) >60 ml/min/1.73m2 Calcium 8.5 8.3 - 10.4 MG/DL  
CBC W/O DIFF Collection Time: 01/28/20  3:34 PM  
Result Value Ref Range WBC 25.4 (H) 4.3 - 11.1 K/uL  
 RBC 3.69 (L) 4.23 - 5.6 M/uL HGB 8.8 (L) 13.6 - 17.2 g/dL HCT 31.3 (L) 41.1 - 50.3 % MCV 84.8 79.6 - 97.8 FL  
 MCH 23.8 (L) 26.1 - 32.9 PG  
 MCHC 28.1 (L) 31.4 - 35.0 g/dL RDW 24.7 (H) 11.9 - 14.6 % PLATELET 135 571 - 209 K/uL MPV 11.3 9.4 - 12.3 FL ABSOLUTE NRBC 0.00 0.0 - 0.2 K/uL LACTIC ACID Collection Time: 01/28/20  3:34 PM  
Result Value Ref Range  Lactic acid 2.9 (HH) 0.4 - 2.0 MMOL/L  
 GLUCOSE, POC Collection Time: 01/28/20  4:34 PM  
Result Value Ref Range Glucose (POC) 197 (H) 65 - 100 mg/dL GLUCOSE, POC Collection Time: 01/28/20 11:42 PM  
Result Value Ref Range Glucose (POC) 133 (H) 65 - 100 mg/dL CBC WITH AUTOMATED DIFF Collection Time: 01/29/20  4:17 AM  
Result Value Ref Range WBC 23.8 (H) 4.3 - 11.1 K/uL  
 RBC 3.73 (L) 4.23 - 5.6 M/uL HGB 9.1 (L) 13.6 - 17.2 g/dL HCT 30.1 (L) 41.1 - 50.3 % MCV 80.7 79.6 - 97.8 FL  
 MCH 24.4 (L) 26.1 - 32.9 PG  
 MCHC 30.2 (L) 31.4 - 35.0 g/dL RDW 23.9 (H) 11.9 - 14.6 % PLATELET 313 123 - 456 K/uL MPV 11.5 9.4 - 12.3 FL ABSOLUTE NRBC 0.00 0.0 - 0.2 K/uL  
 DF AUTOMATED NEUTROPHILS 91 (H) 43 - 78 % LYMPHOCYTES 2 (L) 13 - 44 % MONOCYTES 4 4.0 - 12.0 % EOSINOPHILS 0 (L) 0.5 - 7.8 % BASOPHILS 0 0.0 - 2.0 % IMMATURE GRANULOCYTES 2 0.0 - 5.0 %  
 ABS. NEUTROPHILS 21.7 (H) 1.7 - 8.2 K/UL  
 ABS. LYMPHOCYTES 0.4 (L) 0.5 - 4.6 K/UL  
 ABS. MONOCYTES 1.0 0.1 - 1.3 K/UL  
 ABS. EOSINOPHILS 0.1 0.0 - 0.8 K/UL  
 ABS. BASOPHILS 0.0 0.0 - 0.2 K/UL  
 ABS. IMM. GRANS. 0.5 0.0 - 0.5 K/UL METABOLIC PANEL, COMPREHENSIVE Collection Time: 01/29/20  4:17 AM  
Result Value Ref Range Sodium 137 136 - 145 mmol/L Potassium 5.3 (H) 3.5 - 5.1 mmol/L Chloride 103 98 - 107 mmol/L  
 CO2 24 21 - 32 mmol/L Anion gap 10 7 - 16 mmol/L Glucose 176 (H) 65 - 100 mg/dL BUN 44 (H) 8 - 23 MG/DL Creatinine 3.82 (H) 0.8 - 1.5 MG/DL  
 GFR est AA 20 (L) >60 ml/min/1.73m2 GFR est non-AA 16 (L) >60 ml/min/1.73m2 Calcium 8.1 (L) 8.3 - 10.4 MG/DL Bilirubin, total 1.3 (H) 0.2 - 1.1 MG/DL  
 ALT (SGPT) 93 (H) 12 - 65 U/L  
 AST (SGOT) 208 (H) 15 - 37 U/L Alk. phosphatase 35 (L) 50 - 136 U/L Protein, total 5.9 (L) 6.3 - 8.2 g/dL Albumin 1.9 (L) 3.2 - 4.6 g/dL Globulin 4.0 (H) 2.3 - 3.5 g/dL A-G Ratio 0.5 (L) 1.2 - 3.5 LACTIC ACID  Collection Time: 01/29/20  4:24 AM  
 Result Value Ref Range Lactic acid 1.8 0.4 - 2.0 MMOL/L  
GLUCOSE, POC Collection Time: 01/29/20  7:26 AM  
Result Value Ref Range Glucose (POC) 188 (H) 65 - 100 mg/dL All Micro Results Procedure Component Value Units Date/Time CULTURE, URINE [[id number]]  (Abnormal) Collected:  01/28/20 0401 Order Status:  Completed Specimen:  Clean catch Updated:  01/29/20 6469 Special Requests: NO SPECIAL REQUESTS Culture result:    
  >100,000 COLONIES/mL GRAM NEGATIVE RODS SUBCULTURE IN PROGRESS  
     
 CULTURE, BLOOD [[id number]] Collected:  01/28/20 0034 Order Status:  Completed Specimen:  Blood Updated:  01/28/20 1939 Special Requests: --     
  RIGHT 
HAND 
  
  GRAM STAIN GRAM NEGATIVE RODS ANAEROBIC BOTTLE POSITIVE RESULTS VERIFIED, PHONED TO AND READ BACK BY [employer] RN @2227 ON [id number] KJ Culture result:    
  CULTURE IN PROGRESS,FURTHER UPDATES TO FOLLOW  
     
 BLOOD CULTURE ID PANEL [[id number]] Collected:  01/28/20 0034 Order Status:  Completed Updated:  01/28/20 1621 CULTURE, BLOOD [[id number]] Collected:  01/28/20 0034 Order Status:  Completed Specimen:  Blood Updated:  01/28/20 0250 Current Meds: 
Current Facility-Administered Medications Medication Dose Route Frequency  acetaminophen (TYLENOL) tablet 650 mg  650 mg Oral Q6H PRN  
 insulin lispro (HUMALOG) injection 5 Units  5 Units SubCUTAneous TIDAC  
 HYDROcodone-acetaminophen (NORCO) 5-325 mg per tablet 1 Tab  1 Tab Oral Q6H PRN  
 hydrocortisone Sod Succ (PF) (SOLU-CORTEF) injection 50 mg  50 mg IntraVENous Q8H  
 sotalol (BETAPACE) tablet 80 mg  80 mg Oral Q12H  
 0.9% sodium chloride infusion  100 mL/hr IntraVENous CONTINUOUS  
 NOREPINephrine (LEVOPHED) 4 mg in 5% dextrose 250 mL infusion  0.5-16 mcg/min IntraVENous TITRATE  Vancomycin Intermittent Placeholder   Other PRN  
 cefepime (MAXIPIME) 1 g in 0.9% sodium chloride (MBP/ADV) 50 mL  1 g IntraVENous Q24H  nystatin (MYCOSTATIN) 100,000 unit/gram powder   Topical BID  
 0.9% sodium chloride infusion 250 mL  250 mL IntraVENous PRN  
 albuterol-ipratropium (DUO-NEB) 2.5 MG-0.5 MG/3 ML  3 mL Nebulization Q4H PRN  
 insulin lispro (HUMALOG) injection   SubCUTAneous AC&HS  ondansetron (ZOFRAN) injection 4 mg  4 mg IntraVENous Q6H PRN Other Studies: 
Results for orders placed or performed during the hospital encounter of 20  
2D ECHO COMPLETE ADULT (TTE) W OR WO CONTR Narrative [facility] [address] [place], [address] [facility] 
[phone] Transthoracic Echocardiogram 
2D, M-mode, Doppler, and Color Doppler Patient: Susie Ramirez 
MR #: [id number] : 1940 Age: 70 years Gender: Male Study date: 2020 Account #: [de-identified] Height: 75 in 
Weight: 334.4 lb 
BSA: 2.73 mï¾² Status:Routine Location: 237 BP: 142/ 80 Allergies: CLINDAMYCIN, MEPERIDINE, LOSARTAN-HYDROCHLOROTHIAZIDE, PENICILLINS Sonographer:  Amanuel Gallardo [facility] Group:  [facility] Referring Physician:  Ana Griffith. Debora Whittington MD 
Reading Physician:  Luba Cyr. Janine Ribeiro MD [facility] INDICATIONS: Pulmonary Edema PROCEDURE: This was a routine study. A transthoracic echocardiogram was 
performed. The study included complete 2D imaging, M-mode, complete spectral 
Doppler, and color Doppler. Intravenous contrast (Definity) was administered. Echocardiographic views were limited by restricted patient mobility and poor 
acoustic window availability. This was a technically difficult study. LEFT VENTRICLE: Size was normal. Systolic function was normal. Ejection 
fraction was estimated in the range of 20 % to 25 %. There was severe 
hypokinesis of the entire anterior, mid anteroseptal, mid inferoseptal,  
entire 
inferior, mid anterolateral, apical septal, apical lateral, and apical  
wall(s). There was mild concentric hypertrophy. The study was not technically  
sufficient 
to allow evaluation of LV diastolic function. RIGHT VENTRICLE: The size was normal. Systolic function was normal. A pacing 
wire was present. The tricuspid jet envelope definition was inadequate for 
estimation of RV systolic pressure. LEFT ATRIUM: The atrium was mildly dilated. RIGHT ATRIUM: Not well visualized. SYSTEMIC VEINS: IVC: The inferior vena cava was mildly dilated. The 
respirophasic change in diameter was less than 50%. AORTIC VALVE: The valve was probably trileaflet. There was no evidence for 
stenosis. There was no insufficiency. MITRAL VALVE: Valve structure was normal. There was no evidence for stenosis. There was no regurgitation. TRICUSPID VALVE: The valve structure was normal. There was no evidence for 
stenosis. There was mild regurgitation. PULMONIC VALVE: Not well visualized. There was no evidence for stenosis. There 
was trivial regurgitation. There was no insufficiency. PERICARDIUM: A possible, small pericardial effusion was identified posterior  
to 
the heart. AORTA: The root exhibited normal size. SUMMARY: 
 
-  Left ventricle: Systolic function was normal. Ejection fraction was 
estimated in the range of 20 % to 25 %. There was severe hypokinesis of the 
entire anterior, mid anteroseptal, mid inferoseptal, entire inferior, mid 
anterolateral, apical septal, apical lateral, and apical wall(s). There was 
mild concentric hypertrophy. -  Left atrium: The atrium was mildly dilated. -  Inferior vena cava, hepatic veins: The inferior vena cava was mildly 
dilated. The respirophasic change in diameter was less than 50%. -  Tricuspid valve: There was mild regurgitation. 
 
-  Pericardium: A possible, small pericardial effusion was identified  
posterior 
to the heart. SYSTEM MEASUREMENT TABLES 
 
2D mode AoR Diam (2D): 3.5 cm 
LA Dimension (2D): 4.7 cm 
 IVS/LVPW (2D): 0.9 IVSd (2D): 1.3 cm LVIDd (2D): 4.4 cm LVIDs (2D): 3.1 cm 
LVOT Area (2D): 3.5 cm2 LVPWd (2D): 1.3 cm RVIDd (2D): 3.3 cm Unspecified Scan Mode LVOT Diam: 2.1 cm Prepared and signed by 
 
Brant Jackson. Dalila Andrade MD [facility] Signed 22-Jan-2020 14:55:59 Xr Chest Sngl V Result Date: 1/28/2020 EXAM: Chest x-ray. INDICATION: Central line placement. COMPARISON: Earlier study on the same day. TECHNIQUE: Frontal view chest x-ray. FINDINGS: There is a new right subclavian central line, with its tip at the cavoatrial junction. No pneumothorax is identified. Bibasilar lung edema or infiltrates are unchanged. Again noted is cardiomegaly and a left chest wall pacemaker. IMPRESSION: 1. New central line, without evidence of a pneumothorax. 2. Unchanged bibasilar lung edema or infiltrates. Assessment and Plan:  
 
Hospital Problems as of 1/29/2020 Date Reviewed: 7/15/2019 Codes Class Noted - Resolved POA Septic shock (HCC) ICD-10-CM: A41.9, R65.21 ICD-9-CM: 038.9, 785.52, 995.92  1/29/2020 - Present Unknown UTI (urinary tract infection) ICD-10-CM: N39.0 ICD-9-CM: 599.0  1/29/2020 - Present Unknown Gram-negative bacteremia ICD-10-CM: R78.81 ICD-9-CM: 790.7, 041.85  1/29/2020 - Present Unknown Chronic pulmonary edema ICD-10-CM: J81.1 ICD-9-CM: 127  1/25/2020 - Present Unknown * (Principal) Fall ICD-10-CM: W19. Joellen [place] ICD-9-CM: E888.9  1/17/2020 - Present Yes Hematoma of right chest wall ICD-10-CM: M50.419M ICD-9-CM: 922.1  1/17/2020 - Present Yes Coagulopathy ([address] from Eliquis adn Indocin use ICD-10-CM: D68.9 ICD-9-CM: 286.9  1/17/2020 - Present Yes Acute pain of right knee ICD-10-CM: M25.561 ICD-9-CM: 719.46  1/17/2020 - Present Yes Debility ICD-10-CM: R53.81 ICD-9-CM: 799.3  1/17/2020 - Present Yes Fluid overload ICD-10-CM: E87.70 ICD-9-CM: 276.69  1/17/2020 - Present Yes  A-fib [facility]) ICD-10-CM: I48.91 
 ICD-9-CM: 427.31  1/17/2020 - Present Unknown Hypotension ICD-10-CM: I95.9 ICD-9-CM: 458.9  1/17/2020 - Present Unknown Anticoagulated ICD-10-CM: Z79.01 
ICD-9-CM: V58.61  1/17/2020 - Present Unknown Chest wall hematoma ICD-10-CM: S20.219A 
ICD-9-CM: 922.1  1/17/2020 - Present Unknown Blood loss anemia ICD-10-CM: D50.0 ICD-9-CM: 280.0  1/17/2020 - Present Unknown Acute blood loss anemia ICD-10-CM: D62 
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 F/u cultures. Cefepime. # Acute metabolic encephalopathy 
 - Resolved. 2/2 above. # AoC dCHF exacerbation - Resolved. Caution with MIVFs as above. # AFib 
 - Con't BB. ff Eliquis per cardiology s/p fall with chest wall hematoma. No further intervention required. # HTN 
 - Low due to sepsis. # DM2 
 - Insulin DC planning/Dispo: Placement but con't ICU for now. Diet:  DIET DIABETIC CONSISTENT CARB 
DVT ppx: None 2/2 hematoma.  
 
Signed: 
Forbes Riedel, MD

## 2020-01-29 NOTE — PROGRESS NOTES
Notified hospitalist via [employer] of elevated temp, low BP and little to no urinary output and results of bladder scan. Pt becoming more lethargic and not responding as well. Problem: Ventilation, Mechanical Invasive (NICU)  Goal: Signs and Symptoms of Listed Potential Problems Will be Absent, Minimized or Managed (Ventilation, Mechanical Invasive)  Signs and symptoms of listed potential problems will be absent, minimized or managed by discharge/transition of care (reference Ventilation, Mechanical Invasive (NICU) CPG).    Outcome: Ongoing (interventions implemented as appropriate)  Patient received trached with a 4.0 Ped Plus Bivona. No resp changes made during this shift. Did lite trach care, patient tolerated well. Will continue to monitor.

## 2020-01-29 NOTE — PROGRESS NOTES
A follow up visit was made to the patient. Emotional support, spiritual presence and  
prayer were provided for the patient. KASSIE Arana

## 2020-01-29 NOTE — PROGRESS NOTES
Physical Therapy Note: 
 
Therapist is discontinuing acute PT services secondary to transfer to the ICU. Patient's goals were not met. Please re-consult acute PTservices when medically appropriate. Thank you, Brad Castro, PT, DPT 
1/29/20 7:28AM

## 2020-01-29 NOTE — WOUND CARE
Bilateral buttocks with 5x3cm each oval dark purple non blanchable areas, consistent with friction/ shear, no bony prominences palpated under these purple areas. Recommend zinc based barrier cream bid and prn continue diligent turning and incontinence care. Will monitor.

## 2020-01-29 NOTE — PROGRESS NOTES
Pt with little to no output. Pt turned and brief changed. Damp. Pt felt very warm. Temp taken. Unable to obtain oral temp. Several attempts at axillary and ranged from 101.9- 103. Bladder scanned pt.  0 reading. Borrowed another bladder scanner. Scanned for >85cc.

## 2020-01-30 PROBLEM — N17.9 AKI (ACUTE KIDNEY INJURY) (HCC): Status: ACTIVE | Noted: 2020-01-01

## 2020-01-30 NOTE — PROGRESS NOTES
Problem: Mobility Impaired (Adult and Pediatric) Goal: *Acute Goals and Plan of Care (Insert Text) Description Goals: (assessed and revised 1/30/20): 
(1.)Mr. Anais Acosta will move from supine to sit and sit to supine , scoot up and down and roll side to side with MINIMAL ASSIST within 7 treatment day(s). (2.)Mr. Anais Acosta will transfer from bed to chair and chair to bed with MODERATE ASSIST x 1 using the least restrictive device within 7 treatment day(s). (3.)Mr. Anais Acosta will ambulate with MODERATE ASSIST for 25+ feet with the least restrictive device within 7 treatment day(s). (4.)Mr. Anais Acosta will perform sit to stand transfer from EOB with MIN A x 2 within 7 treatment days. (5.)Mr. Anais Acosta will perform static and dynamic sitting balance at EOB x 8 min within 7 treatment days for increased trunk control. PHYSICAL THERAPY: Re-evaluation and AM 1/30/2020 INPATIENT: PT Visit Days : 1 [place] / [place] / Product Type: Medicare /   
  
NAME/AGE/GENDER: Sebastian Boyd is a 70 y.o. male PRIMARY DIAGNOSIS: Chest wall hematoma [S20.219A] Hypotension [I95.9] A-fib ([facility] [address] [I48.91] Anticoagulated [Z79.01] Acute blood loss anemia [D62] Subcutaneous hematoma [T14. Светлана Dyke Fall Fall ICD-10: Treatment Diagnosis:  
 · Generalized Muscle Weakness (M62.81) · Difficulty in walking, Not elsewhere classified (R26.2) · Repeated Falls (R29.6) Precaution/Allergies: 
Clindamycin; Demerol [meperidine]; Losartan-hydrochlorothiazide; and Pcn [penicillins] ASSESSMENT:  
 
Mr. Anais Acosta is now seen in ICU for reassessment, pt with rapid response called on 1/28/20 with hypotension, AMS. Pt on 2 L/min O2 via n.c, recently given pain medication. Pt [place], does not have hearing aides in due to O2 line. Pt with decreased AROM B LE in supine, AAROM for full range B. Pt max A x 2 for rolling and supine to sit transfers.  Pt with fair to good static sitting balance at EOB while holding onto bed railing for support. Pt perform pre-gait activities with PT with sit to stand attempts x 2-3 at bedside. Pt required max A x 2 for first 2 standing attempts. Pt able to stand and weight bear on B LEs, B knees flexed, trunk flexed. PT provided cues for posture, use of walker for support, head control. Pt unable to stand longer than 15-20 seconds, unable to attempt marching in place or weight shifting. Pt functioning well below baseline level of mobility, appears to have declined in strength, mobility and activity tolerance over past several days. Pt goals assessed and modified this visit. PT to cont to follow for acute care needs. Pt will need rehab at discharge. Co-treatment with OT is appropriate at this time due to patient's decreased activity tolerance, need for multiple skilled disciplines with activity, and ability to return to normal activity function. PT addressed pre-gait activities during today's treatment, OT addressed balance, activity tolerance, neuro-reeducation 
 
 
 per initial: a 70year old WM with an admitting diagnosis of Chest wall hematoma secondary to a fall at home with his rollator. His PMH includes DM2, A-fib, pacemaker, CHF, obesity and falls. He reports he lives with his wife in a 1 level home with 1 step to enter. His PLOF has been with use of his rollator and he reports he has a power scooter that he uses in his home and in the community. He reports several falls at home. He reports he only walks short distances at home from one room to another. He cannot mange his size and weight at this time and remains limited with mobility and endurance. Safety is a primary concern with his mobility. This section established at most recent assessment PROBLEM LIST (Impairments causing functional limitations): 1. Decreased Strength 2. Decreased ADL/Functional Activities 3. Decreased Transfer Abilities 4. Decreased Ambulation Ability/Technique 5. Decreased Balance 6. Decreased Activity Tolerance 7. Decreased Flexibility/Joint Mobility 8. Edema/Girth INTERVENTIONS PLANNED: (Benefits and precautions of physical therapy have been discussed with the patient.) 1. Balance Exercise 2. Bed Mobility 3. Family Education 4. Gait Training 5. Home Exercise Program (HEP) 6. Neuromuscular Re-education/Strengthening 7. Range of Motion (ROM) 8. Therapeutic Activites 9. Therapeutic Exercise/Strengthening 10. Transfer Training TREATMENT PLAN: Frequency/Duration: 3 times a week for duration of hospital stay Rehabilitation Potential For Stated Goals: Fair REHAB RECOMMENDATIONS (at time of discharge pending progress):   
Placement: It is my opinion, based on this patient's performance to date, that Mr. Anish Vila may benefit from intensive therapy at a [address] [place] [address] after discharge due to the functional deficits listed above that are likely to improve with skilled rehabilitation and concerns that he/she may be unsafe to be unsupervised at home due to weakness and history of falls. Equipment:  
? None at this time HISTORY:  
History of Present Injury/Illness (Reason for Referral): Surinder Artis is a 70 y.o. male who on Eliquis for afib who came to the ER on 1/17/20 after a fall 1-2 hrs prior to arrival. 
He also takes Indocin He had swelling and hematoma of the right upper anterior chest 
He fell from a standing position while using a Rollator and landed on the device and hit is right anterior chest.  
He developed progressive severe swelling and hematoma He is on Eliquis for atrial fibrillation.  Over the past 2 hours since the fall the hematoma has expanded to a small melon. Very painful and firm. No SOB. Denies any hip or back pain. Also has associated right knee pain No LOC or head trauma Past Medical History/Comorbidities: Mr. Toño Jerry  has a past medical history of Arrhythmia, Arthritis, CAD (coronary artery disease), Cardiac pacemaker (11/12/2015), Chronic diastolic heart failure ([address] (11/12/2015), Chronic pain, Diabetes ([address] Diabetes mellitus type II, uncontrolled ([address] Dyspnea/shortness of breath (11/12/2015), GERD (gastroesophageal reflux disease), Heart failure ([address] HTN - uncontrolled, malignant (8/8/2016), Hyperlipidemia (11/12/2015), Hypertension, Malaise/fatigue/weakness/tiredness (11/12/2015), Mixed hyperlipidemia, Morbid obesity ([address] Orthostatic hypotension (11/12/2015), Other ill-defined conditions(799.89), Paroxysmal atrial fibrillation ([address] Permanent atrial fibrillation ([address] (11/12/2015), Tendon injury, and Unspecified sleep apnea. Mr. Toño Jerry  has a past surgical history that includes hx other surgical; hx hernia repair; pr left heart cath,percutaneous (3/31/2014); and hx pacemaker. Social History/Living Environment:  
Home Environment: Private residence # Steps to Enter: 1 One/Two Story Residence: One story Living Alone: No 
Support Systems: Spouse/Significant Other/Partner Patient Expects to be Discharged to[de-identified] Unknown Current DME Used/Available at Home: [employer], rollator, Com[employer], bedside Prior Level of Function/Work/Activity: 
Patient reports he has been ambulating with a rollator at home and uses his power scooter but her also reports several falls. Number of Personal Factors/Comorbidities that affect the Plan of Care: 3+: HIGH COMPLEXITY EXAMINATION:  
Most Recent Physical Functioning:  
Gross Assessment: 
AROM: Generally decreased, functional(B LE) Strength: Generally decreased, functional(B LE) Coordination: Generally decreased, functional 
Sensation: Intact(B LE to light touch) Posture: 
Posture (WDL): Exceptions to [facility] Posture Assessment: Forward head, Kyphosis, Rounded shoulders Balance: 
Sitting: Impaired Sitting - Static: Fair (occasional); Good (unsupported) Sitting - Dynamic: Fair (occasional) Standing: Impaired Standing - Static: Constant support;Poor Standing - Dynamic : Constant support;Poor Bed Mobility: 
Rolling: Maximum assistance;Assist x2 Supine to Sit: Maximum assistance;Assist x2 Sit to Supine: Maximum assistance;Assist x2 Scooting: Maximum assistance;Assist x2 Wheelchair Mobility: 
  
Transfers: 
Sit to Stand: Maximum assistance;Assist x2 Stand to Sit: Maximum assistance;Assist x2 Gait: 
  
   
  
Body Structures Involved: 1. Muscles Body Functions Affected: 1. Neuromusculoskeletal 
2. Movement Related Activities and Participation Affected: 1. General Tasks and Demands 2. Mobility 3. Self Care Number of elements that affect the Plan of Care: 3: MODERATE COMPLEXITY CLINICAL PRESENTATION:  
Presentation: Evolving clinical presentation with changing clinical characteristics: MODERATE COMPLEXITY CLINICAL DECISION MAKIN[address] [place] [id number] AM-PAC 6 Clicks Basic Mobility Inpatient Short Form How much difficulty does the patient currently have. .. Unable A Lot A Little None 1. Turning over in bed (including adjusting bedclothes, sheets and blankets)? [] 1   [x] 2   [] 3   [] 4  
2. Sitting down on and standing up from a chair with arms ( e.g., wheelchair, bedside commode, etc.)   [x] 1   [] 2   [] 3   [] 4  
3. Moving from lying on back to sitting on the side of the bed? [x] 1   [] 2   [] 3   [] 4 How much help from another person does the patient currently need. .. Total A Lot A Little None 4. Moving to and from a bed to a chair (including a wheelchair)? [x] 1   [] 2   [] 3   [] 4  
5. Need to walk in hospital room? [x] 1   [] 2   [] 3   [] 4  
6. Climbing 3-5 steps with a railing? [x] 1   [] 2   [] 3   [] 4  
© 2007, Trustees of [address] [place] [id number], under license to [employer]. All rights reserved Score:  Initial: 11 Most Recent: 7 (Date: -20- ) Interpretation of Tool:  Represents activities that are increasingly more difficult (i.e. Bed mobility, Transfers, Gait). Medical Necessity:    
· Patient is expected to demonstrate progress in strength and functional technique to decrease assistance required with bed mobility, SPT and short distance gait with rollator. Reason for Services/Other Comments: 
· Patient continues to require skilled intervention due to medical complications. Use of outcome tool(s) and clinical judgement create a POC that gives a: Questionable prediction of patient's progress: MODERATE COMPLEXITY  
  
 
 
 
TREATMENT:  
(In addition to Assessment/Re-Assessment sessions the following treatments were rendered) Pre-treatment Symptoms/Complaints:  \"I'm not sure I can do this well today' Pain: Initial: does not rate, RN provided medication Post Session:  0/10 states comfortable Gait Training (  8 min):  Pre-Gait training to improve and/or restore physical functioning as related to mobility, strength, balance and coordination. Attempted pre-gait activities to include standing,weigth shifting, posture, walker safety using maximal verbal and tactile cues   related to their posture, walker safety, body alignment to promote proper body alignment, promote proper body posture and promote proper body mechanics. Instruction in performance of weight shifting, walker use, posture to correct initial pre-gait mobiltiy for overall functional ambulation. Therapeutic Exercise: ( 0 mins):  Exercises per grid below to improve mobility and strength. Required maximal visual and verbal cues to promote proper body alignment and promote proper body mechanics. Weak in the legs with exercise and mobility. Date: 
1/22/20 Date: 
 Date: Activity/Exercise Parameters Parameters Parameters Ankle pumps 2 x 10 Heel slides in supine 10 B Hip IR/ER in supine 10 B Seated knee extension 2 x 10    
     
     
     
 
 Braces/Orthotics/Lines/Etc:  
· O2 Device: Hi flow nasal cannula Treatment/Session Assessment:   
· Response to Treatment:  Pt is very weak and limited secondary to size and weight · Interdisciplinary Collaboration:  
o Physical Therapist 
o Occupational Therapist 
o Registered Nurse · After treatment position/precautions:  
o Supine in bed 
o Bed/Chair-wheels locked 
o Bed in low position 
o Call light within reach 
o RN notified · Compliance with Program/Exercises: Will assess as treatment progresses · Recommendations/Intent for next treatment session: \"Next visit will focus on advancements to more challenging activities and reduction in assistance provided\". Total Treatment Duration: PT Patient Time In/Time Out Time In: 0767 Time Out: 1124 Taran Ram PT

## 2020-01-30 NOTE — PROGRESS NOTES
01/30/20 1322 Dual Skin Pressure Injury Assessment Dual Skin Pressure Injury Assessment X Second Care Provider (Based on [address] [place] [address]) Ximena Arce RN Gluteal Cleft  Fissure vs Pressure Injury Skin Integumentary Skin Integumentary (WDL) X Skin Color Pale Skin Condition/Temp Warm;Dry Skin Integrity Excoriation; Intact; Other (comment) 
(excoriation to perianal and BUE, wound to gluteal cleft) Turgor Non-tenting Pressure  Injury Documentation Pressure Injury Noted-See Wound LDA to Document

## 2020-01-30 NOTE — PROGRESS NOTES
TRANSFER - OUT REPORT: 
 
Verbal report given to [facility] RN(name) on Ag De  being transferred to [facility](unit) for routine progression of care Report consisted of patients Situation, Background, Assessment and  
Recommendations(SBAR). Information from the following report(s) SBAR, Kardex, Intake/Output, MAR, Recent Results and Cardiac Rhythm Paced was reviewed with the receiving nurse. Lines:  
Quad Lumen 01/28/20 Anterior;Right Neck (Active) Central Line Being Utilized Yes 1/30/2020  9:24 AM  
Criteria for Appropriate Use Hemodynamically unstable, requiring monitoring lines, vasopressors, or volume resuscitation 1/30/2020  9:24 AM  
Site Assessment Clean, dry, & intact 1/30/2020  9:24 AM  
Infiltration Assessment 0 1/30/2020  9:24 AM  
Affected Extremity/Extremities Color distal to insertion site pink (or appropriate for race) 1/30/2020  9:24 AM  
Date of Last Dressing Change 01/28/20 1/30/2020  9:24 AM  
Dressing Status Clean, dry, & intact 1/30/2020  9:24 AM  
Dressing Type Disk with Chlorhexadine gluconate (CHG); Transparent 1/30/2020  9:24 AM  
Action Taken Other (comment) 1/30/2020  9:24 AM  
Proximal Hub Color/Line Status White;Capped 1/30/2020  7:30 AM  
Positive Blood Return (Medial Site) Yes 1/29/2020  7:00 PM  
Medial 1 Hub Color/Line Status Blue;Capped 1/30/2020  7:30 AM  
Positive Blood Return (Lateral Site) Yes 1/29/2020  7:00 PM  
Medial 2 Hub Color/Line Status Gray;Capped 1/30/2020  7:30 AM  
Positive Blood Return (Site #3) Yes 1/29/2020  7:00 PM  
Distal Hub Color/Line Status Brown;Capped 1/30/2020  7:30 AM  
Positive Blood Return (Site #4) Yes 1/29/2020  7:00 PM  
Alcohol Cap Used No 1/30/2020  9:24 AM  
   
Peripheral IV 01/28/20 Right Hand (Active) Site Assessment Clean, dry, & intact 1/30/2020  7:30 AM  
Phlebitis Assessment 0 1/30/2020  7:30 AM  
Infiltration Assessment 0 1/30/2020  7:30 AM  
Dressing Status Clean, dry, & intact 1/30/2020  7:30 AM  
 Dressing Type Tape;Transparent 1/30/2020  7:30 AM  
Hub Color/Line Status Capped 1/30/2020  7:30 AM  
Alcohol Cap Used No 1/29/2020  7:00 PM  
   
Peripheral IV 01/28/20 Right Arm (Active) Site Assessment Dry; Intact 1/30/2020  7:30 AM  
Phlebitis Assessment 0 1/30/2020  7:30 AM  
Infiltration Assessment 0 1/30/2020  7:30 AM  
Dressing Status Dry; Intact 1/30/2020  7:30 AM  
Dressing Type Tape;Transparent 1/30/2020  7:30 AM  
Hub Color/Line Status Pink;Capped 1/30/2020  7:30 AM  
Alcohol Cap Used No 1/29/2020  7:00 PM  
   
Peripheral IV 01/28/20 Left Hand (Active) Site Assessment Clean, dry, & intact 1/30/2020  7:30 AM  
Phlebitis Assessment 0 1/30/2020  7:30 AM  
Infiltration Assessment 0 1/30/2020  7:30 AM  
Dressing Status Clean, dry, & intact 1/30/2020  7:30 AM  
Dressing Type Tape;Transparent 1/30/2020  7:30 AM  
Hub Color/Line Status Capped 1/30/2020  7:30 AM  
Alcohol Cap Used No 1/29/2020  7:00 PM  
  
 
Opportunity for questions and clarification was provided. Patient transported with: 
 O2 @ 3 liters; belongings; chart

## 2020-01-30 NOTE — PROGRESS NOTES
[facility] OUTREACH NURSE PROGRESS REPORT SUBJECTIVE: Called to assess patient secondary to transfer from critical care today. MEWS Score: 2 (01/30/20 1320) Vitals:  
 01/30/20 0834 01/30/20 0900 01/30/20 1036 01/30/20 1320 BP: 102/57 110/55  140/73 Pulse: 79 81  78 Resp: 13 29  22 Temp:    98 °F (36.7 °C) SpO2: 98% 98% 98% 96% Weight:      
Height:      
  
 
LAB DATA: 
 
Recent Labs  
  01/30/20 
0348 01/29/20 
0417 01/28/20 
1534  137 136  
K 4.7 5.3* 5.9*  
 103 101 CO2 25 24 25 AGAP 8 10 10 * 176* 183* BUN 55* 44* 36* CREA 3.21* 3.82* 3.45* GFRAA 24* 20* 22* GFRNA 20* 16* 18* CA 7.8* 8.1* 8.5 ALB  --  1.9*  --   
TP  --  5.9*  --   
GLOB  --  4.0*  --   
AGRAT  --  0.5*  --   
ALT  --  93*  --   
  
 
Recent Labs  
  01/30/20 
0348 01/29/20 
0417 01/28/20 
1534 WBC 14.3* 23.8* 25.4* HGB 8.3* 9.1* 8.8* HCT 27.2* 30.1* 31.3*  
 283 353 OBJECTIVE: Assisted with transfer to room. Pain Assessment Pain Intensity 1: 4 (01/30/20 1039) Pain Location 1: Hip Pain Intervention(s) 1: Medication (see MAR) Patient Stated Pain Goal: 0 
 
  
  
  
  
 
  
  
  
   
 
ASSESSMENT:  Patient alert, oriented and talkative. SpO2 96% on 2L NC, HR 78. /73. Goldman patent and draining- to remain in place per Dr. Pamela Braxton. Primary RN with no questions/concerns at this time. PLAN:  Continue to follow per critical care outreach protocol.

## 2020-01-30 NOTE — PROGRESS NOTES
Spoke with primary RN this morning. Plans to d/c central line as pt has PIVs and will be transferring to floor.

## 2020-01-30 NOTE — PROGRESS NOTES
Bedside and Verbal shift change report given to [facility] (oncoming nurse) by Jackeline Verma RN (offgoing nurse). Report included the following information SBAR, Kardex, Procedure Summary, Intake/Output, MAR, Recent Results and Alarm Parameters .

## 2020-01-30 NOTE — PROGRESS NOTES
Hospitalist Note Admit Date:  2020  3:33 PM  
Name:  Christian Francisco Age:  70 y.o. 
:  1940 MRN:  [id number] PCP:  Ricardo Miguel MD 
Treatment Team: Attending Provider: Ronaldo Montanez MD; Utilization Review: Damian Martinez RN; Utilization Review: Deborah Johnson RN; Care Manager: Quin Melendez RN; Physical Therapist: Jaskaran Osborne, PT; Occupational Therapist: Aysha Gillespie OT 
 
HPI/Subjective:  
Mr. Kenia Cassidy is a 71 yo male with PMH of DM2, AFIB on eliquis, obesity, HFpEF, falls admitted with a fall with resultant right chest wall hematoma with associated anemia, hypotension and coagulopathy. He required PRBC. Surgery consulted and no intervention needed. He has developed pulmonary edema/ acute hypoxia requiring IV lasix. Cardiology has evaluated and is agreeable to stop eliquis going forward. ECHO  LVEF 20-25%, milds concentric LVH. RR called on  for HoTN and confusion, worked up and treated for sepsis 2/2 urinary source and transferred to ICU. 
 
: Feeling well, off Levo and BPs better. C/o right hip pain which he has chronically but hurts a little more today than the last few days. UOP is good. Slept well. ROS neg otherwise. No other complaints Objective:  
 
Patient Vitals for the past 24 hrs: 
 Temp Pulse Resp BP SpO2  
20 0900  81 29 110/55 98 % 20 0834  79 13 102/57 98 % 20 0730 97.5 °F (36.4 °C) 83 22 141/79 99 % 20 0700  79 23 141/73 98 % 20 0630  78 21 120/66 98 % 20 0559  77 16 119/61 98 % 20 0529  77 24 113/59 98 % 20 0459  77 20 108/59 98 % 20 0429  78 23 125/61 98 % 20 0400  81 22    
20 0359 98 °F (36.7 °C)   119/57 95 % 20 0330  79 27 117/60 90 % 20 0300    95/61 98 % 20 0259  75 20    
20 0229  78 23 (!) 81/43 97 % 20 0200  77 22 (!) 82/45 97 % 01/30/20 0130  78 22 112/55 97 % 01/30/20 0059  79 26 98/51 96 % 01/30/20 0031  78 20    
01/30/20 0030 97.5 °F (36.4 °C)   109/72 (!) 84 % 01/29/20 2347     97 % 01/29/20 2330  78 22 105/58 95 % 01/29/20 2259  78 23 114/54 98 % 01/29/20 2230  78 21 108/55 98 % 01/29/20 2159  78 19 110/57 99 % 01/29/20 2130    103/56 100 % 01/29/20 2129  80 16    
01/29/20 2100  83 21 93/54 95 % 01/29/20 2030  75 17 116/55   
01/29/20 2000  75 27 128/71 100 % 01/29/20 1930  79 15    
01/29/20 1929   (!) 34 91/52 98 % 01/29/20 1926     97 % 01/29/20 1901  78 22    
01/29/20 1859 98 °F (36.7 °C) 78 22 93/55 97 % 01/29/20 1838  79 26 (!) 88/51 96 % 01/29/20 1816  80 23 127/65 97 % 01/29/20 1800  82 (!) 37 98/54 97 % 01/29/20 1744  79 (!) 34 109/63 97 % 01/29/20 1730  81 15 118/58 100 % 01/29/20 1716  78 13 118/66 99 % 01/29/20 1706     100 % 01/29/20 1659  78 25 111/58   
01/29/20 1644  78 18 105/61   
01/29/20 1639     95 % 01/29/20 1629  78 28 106/57 97 % 01/29/20 1615  79 (!) 31 102/62 97 % 01/29/20 1559 97.9 °F (36.6 °C) 78 (!) 32 104/55 97 % 01/29/20 1544  78 (!) 36 106/61 97 % 01/29/20 1533  77 25 106/57 98 % 01/29/20 1515  79 (!) 46 113/59 97 % 01/29/20 1459  77 29 113/61 97 % 01/29/20 1444  78 (!) 38 113/62 97 % 01/29/20 1429  78 (!) 36 108/60 96 % 01/29/20 1416  79 25 100/65 95 % 01/29/20 1359  78 23 118/65 98 % 01/29/20 1345  78 (!) 40 122/64 99 % 01/29/20 1333  79 (!) 35 119/74 95 % 01/29/20 1315  79 29 109/56 96 % 01/29/20 1300  78 30 113/58 98 % 01/29/20 1245  78 (!) 31 102/53 96 % 01/29/20 1229  78 26 109/55 98 % 01/29/20 1215  78 28 99/56 98 % 01/29/20 1200 98.4 °F (36.9 °C) 79 20 97/53 99 % 01/29/20 1147   29  94 % 01/29/20 1145  80 (!) 47 102/55 (!) 85 % 01/29/20 1134     94 % 01/29/20 1130  79 30 99/51 99 % 01/29/20 1115  79 15 (!) 84/48 99 % 01/29/20 1100  79 28 95/53 100 % 01/29/20 1044  77 29 97/55   
01/29/20 1043     100 % Oxygen Therapy O2 Sat (%): 98 % (01/30/20 0900) Pulse via Oximetry: 83 beats per minute (01/30/20 0900) O2 Device: Nasal cannula (01/30/20 0730) O2 Flow Rate (L/min): 2 l/min (01/30/20 0730) FIO2 (%): 60 % (01/27/20 2146) Estimated body mass index is 46.28 kg/m² as calculated from the following: 
  Height as of this encounter: 6' (1.829 m). Weight as of this encounter: 154.8 kg (341 lb 4.4 oz). Intake/Output Summary (Last 24 hours) at 1/30/2020 1034 Last data filed at 1/30/2020 7069 Gross per 24 hour Intake 2095.42 ml Output 800 ml Net 1295.42 ml *Note that automatically entered I/Os may not be accurate; dependent on patient compliance with collection and accurate  by [employer]. General:    Well nourished. Alert. Obese. CV:   RRR. No murmur, rub, or gallop. Right subclavian CVC. Lungs:   CTAB. No wheezing, rhonchi, or rales. Abdomen:   Soft, nontender, nondistended. Extremities: Warm and dry. No cyanosis or edema. No bruising or ttp of the right hip/proximal femur. Skin:     No rashes or jaundice. Right chest wall/pectoral area hematoma with overlying bruising. Neuro:  No gross focal deficits Data Review: 
I have reviewed all labs, meds, and studies from the last 24 hours: 
 
Recent Results (from the past 24 hour(s)) GLUCOSE, POC Collection Time: 01/29/20 11:28 AM  
Result Value Ref Range Glucose (POC) 162 (H) 65 - 100 mg/dL GLUCOSE, POC Collection Time: 01/29/20  3:48 PM  
Result Value Ref Range Glucose (POC) 191 (H) 65 - 100 mg/dL GLUCOSE, POC Collection Time: 01/29/20  9:20 PM  
Result Value Ref Range Glucose (POC) 197 (H) 65 - 100 mg/dL POC G3 Collection Time: 01/29/20  9:24 PM  
Result Value Ref Range Device: NASAL CANNULA pH (POC) 7.394 7.35 - 7.45    
 pCO2 (POC) 36.3 35 - 45 MMHG  
 pO2 (POC) 87 75 - 100 MMHG HCO3 (POC) 22.2 22 - 26 MMOL/L  
 sO2 (POC) 97 95 - 98 % Base deficit (POC) 2 mmol/L Allens test (POC) YES Site RIGHT RADIAL Specimen type (POC) ARTERIAL Performed by Sonya   
 CO2, POC 23 MMOL/L Flow rate (POC) 2.000 L/min Critical value read back 21:25 Respiratory comment: NurseNotified COLLECT TIME 2,120 CBC WITH AUTOMATED DIFF Collection Time: 01/30/20  3:48 AM  
Result Value Ref Range WBC 14.3 (H) 4.3 - 11.1 K/uL  
 RBC 3.41 (L) 4.23 - 5.6 M/uL HGB 8.3 (L) 13.6 - 17.2 g/dL HCT 27.2 (L) 41.1 - 50.3 % MCV 79.8 79.6 - 97.8 FL  
 MCH 24.3 (L) 26.1 - 32.9 PG  
 MCHC 30.5 (L) 31.4 - 35.0 g/dL RDW 23.7 (H) 11.9 - 14.6 % PLATELET 992 408 - 731 K/uL MPV 10.9 9.4 - 12.3 FL ABSOLUTE NRBC 0.02 0.0 - 0.2 K/uL  
 DF AUTOMATED NEUTROPHILS 90 (H) 43 - 78 % LYMPHOCYTES 4 (L) 13 - 44 % MONOCYTES 5 4.0 - 12.0 % EOSINOPHILS 0 (L) 0.5 - 7.8 % BASOPHILS 0 0.0 - 2.0 % IMMATURE GRANULOCYTES 1 0.0 - 5.0 %  
 ABS. NEUTROPHILS 12.9 (H) 1.7 - 8.2 K/UL  
 ABS. LYMPHOCYTES 0.5 0.5 - 4.6 K/UL  
 ABS. MONOCYTES 0.7 0.1 - 1.3 K/UL  
 ABS. EOSINOPHILS 0.0 0.0 - 0.8 K/UL  
 ABS. BASOPHILS 0.0 0.0 - 0.2 K/UL  
 ABS. IMM. GRANS. 0.1 0.0 - 0.5 K/UL METABOLIC PANEL, BASIC Collection Time: 01/30/20  3:48 AM  
Result Value Ref Range Sodium 136 136 - 145 mmol/L Potassium 4.7 3.5 - 5.1 mmol/L Chloride 103 98 - 107 mmol/L  
 CO2 25 21 - 32 mmol/L Anion gap 8 7 - 16 mmol/L Glucose 181 (H) 65 - 100 mg/dL BUN 55 (H) 8 - 23 MG/DL Creatinine 3.21 (H) 0.8 - 1.5 MG/DL  
 GFR est AA 24 (L) >60 ml/min/1.73m2 GFR est non-AA 20 (L) >60 ml/min/1.73m2 Calcium 7.8 (L) 8.3 - 10.4 MG/DL  
GLUCOSE, POC Collection Time: 01/30/20  7:42 AM  
Result Value Ref Range Glucose (POC) 172 (H) 65 - 100 mg/dL All Micro Results Procedure Component Value Units Date/Time CULTURE, URINE [[id number]]  (Abnormal) Collected:  01/28/20 5033 Order Status:  Completed Specimen:  Clean catch Updated:  01/30/20 3587 Special Requests: NO SPECIAL REQUESTS Culture result:    
  >100,000 COLONIES/mL GRAM NEGATIVE RODS IDENTIFICATION AND SUSCEPTIBILITY TO FOLLOW CULTURE IN PROGRESS,FURTHER UPDATES TO FOLLOW CULTURE, BLOOD [[id number]]  (Abnormal) Collected:  01/28/20 0034 Order Status:  Completed Specimen:  Blood Updated:  01/30/20 0720 Special Requests: --     
  RIGHT 
HAND 
  
  GRAM STAIN GRAM NEGATIVE RODS ANAEROBIC BOTTLE POSITIVE RESULTS VERIFIED, PHONED TO AND READ BACK BY [facility] Rose Agee RN @[id number] ON [id number] J Culture result: ESCHERICHIA COLI     
   REFER TO BIOFIRE PANEL [id number] CULTURE IN PROGRESS,FURTHER UPDATES TO FOLLOW CULTURE, BLOOD [[id number]] Collected:  01/28/20 0034 Order Status:  Completed Specimen:  Blood Updated:  01/29/20 1126 Special Requests: --     
  LEFT 
HAND Culture result: NO GROWTH 1 DAY BLOOD CULTURE ID PANEL [[id number]]  (Abnormal) Collected:  01/28/20 0034 Order Status:  Completed Specimen:  Blood Updated:  01/29/20 0754 Acc. no. from [employer] [id number] Escherichia coli DETECTED Comment: RESULTS VERIFIED, PHONED TO AND READ BACK BY 
MOJGAN TELLES RN AT 9675 5.29.20  
  
  
  KPC (Carbapenem Resistance Gene) NOT DETECTED Comment: WARNING:  A Not Detected result for the KPC gene does not indicate susceptibility to carbapenems. Gram negative bacteria can be resistant to carbapenems by mechanisms other than carrying the KPC gene. INTERPRETATION Gram negative cheryle. Identified by realtime PCR as E. coli Current Meds: 
Current Facility-Administered Medications Medication Dose Route Frequency  [START ON 1/31/2020] cefepime (MAXIPIME) 1 g in 0.9% sodium chloride (MBP/ADV) 50 mL  1 g IntraVENous Q24H  
 traMADol (ULTRAM) tablet 50 mg  50 mg Oral Q6H PRN  
  hydrocortisone Sod Succ (PF) (SOLU-CORTEF) injection 50 mg  50 mg IntraVENous Q12H  
 [START ON 2020] hydrocortisone Sod Succ (PF) (SOLU-CORTEF) injection 50 mg  50 mg IntraVENous ONCE  
 acetaminophen (TYLENOL) tablet 650 mg  650 mg Oral Q6H PRN  
 insulin lispro (HUMALOG) injection 5 Units  5 Units SubCUTAneous TIDAC  
 HYDROcodone-acetaminophen (NORCO) 5-325 mg per tablet 1 Tab  1 Tab Oral Q6H PRN  
 sotalol (BETAPACE) tablet 80 mg  80 mg Oral Q12H  nystatin (MYCOSTATIN) 100,000 unit/gram powder   Topical BID  
 0.9% sodium chloride infusion 250 mL  250 mL IntraVENous PRN  
 albuterol-ipratropium (DUO-NEB) 2.5 MG-0.5 MG/3 ML  3 mL Nebulization Q4H PRN  
 insulin lispro (HUMALOG) injection   SubCUTAneous AC&HS  ondansetron (ZOFRAN) injection 4 mg  4 mg IntraVENous Q6H PRN Other Studies: 
Results for orders placed or performed during the hospital encounter of 20  
2D ECHO COMPLETE ADULT (TTE) W OR WO CONTR Narrative [facility] [address] [place], [address] [facility] 
[phone] Transthoracic Echocardiogram 
2D, M-mode, Doppler, and Color Doppler Patient: Mackenzie Cloud 
MR #: [id number] : 1940 Age: 70 years Gender: Male Study date: 2020 Account #: [de-identified] Height: 75 in 
Weight: 334.4 lb 
BSA: 2.73 mï¾² Status:Routine Location: 237 BP: 142/ 80 Allergies: CLINDAMYCIN, MEPERIDINE, LOSARTAN-HYDROCHLOROTHIAZIDE, PENICILLINS Sonographer:  ARELI White Group:  [address] Cardiology Referring Physician:  Montse Ferrara. Kiah Melagr MD 
Reading Physician:  Ranjana Michelle. David Clarke MD [facility] INDICATIONS: Pulmonary Edema PROCEDURE: This was a routine study. A transthoracic echocardiogram was 
performed. The study included complete 2D imaging, M-mode, complete spectral 
Doppler, and color Doppler. Intravenous contrast (Definity) was administered. Echocardiographic views were limited by restricted patient mobility and poor 
acoustic window availability. This was a technically difficult study. LEFT VENTRICLE: Size was normal. Systolic function was normal. Ejection 
fraction was estimated in the range of 20 % to 25 %. There was severe 
hypokinesis of the entire anterior, mid anteroseptal, mid inferoseptal,  
entire 
inferior, mid anterolateral, apical septal, apical lateral, and apical  
wall(s). There was mild concentric hypertrophy. The study was not technically  
sufficient 
to allow evaluation of LV diastolic function. RIGHT VENTRICLE: The size was normal. Systolic function was normal. A pacing 
wire was present. The tricuspid jet envelope definition was inadequate for 
estimation of RV systolic pressure. LEFT ATRIUM: The atrium was mildly dilated. RIGHT ATRIUM: Not well visualized. SYSTEMIC VEINS: IVC: The inferior vena cava was mildly dilated. The 
respirophasic change in diameter was less than 50%. AORTIC VALVE: The valve was probably trileaflet. There was no evidence for 
stenosis. There was no insufficiency. MITRAL VALVE: Valve structure was normal. There was no evidence for stenosis. There was no regurgitation. TRICUSPID VALVE: The valve structure was normal. There was no evidence for 
stenosis. There was mild regurgitation. PULMONIC VALVE: Not well visualized. There was no evidence for stenosis. There 
was trivial regurgitation. There was no insufficiency. PERICARDIUM: A possible, small pericardial effusion was identified posterior  
to 
the heart. AORTA: The root exhibited normal size. SUMMARY: 
 
-  Left ventricle: Systolic function was normal. Ejection fraction was 
estimated in the range of 20 % to 25 %. There was severe hypokinesis of the 
entire anterior, mid anteroseptal, mid inferoseptal, entire inferior, mid 
anterolateral, apical septal, apical lateral, and apical wall(s). There was mild concentric hypertrophy. -  Left atrium: The atrium was mildly dilated. -  Inferior vena cava, hepatic veins: The inferior vena cava was mildly 
dilated. The respirophasic change in diameter was less than 50%. -  Tricuspid valve: There was mild regurgitation. 
 
-  Pericardium: A possible, small pericardial effusion was identified  
posterior 
to the heart. SYSTEM MEASUREMENT TABLES 
 
2D mode AoR Diam (2D): 3.5 cm 
LA Dimension (2D): 4.7 cm IVS/LVPW (2D): 0.9 IVSd (2D): 1.3 cm LVIDd (2D): 4.4 cm LVIDs (2D): 3.1 cm 
LVOT Area (2D): 3.5 cm2 LVPWd (2D): 1.3 cm RVIDd (2D): 3.3 cm Unspecified Scan Mode LVOT Diam: 2.1 cm Prepared and signed by 
 
Zackary Carbajal. Leora Butcher MD [facility] Signed 22-Jan-2020 14:55:59 No results found. Assessment and Plan:  
 
Hospital Problems as of 1/30/2020 Date Reviewed: 7/15/2019 Codes Class Noted - Resolved POA MONTEZ (acute kidney injury) ([facility] [address] ICD-10-CM: N17.9 ICD-9-CM: 584.9  1/30/2020 - Present Unknown Septic shock (HCC) ICD-10-CM: A41.9, R65.21 ICD-9-CM: 038.9, 785.52, 995.92  1/29/2020 - Present Unknown UTI (urinary tract infection) ICD-10-CM: N39.0 ICD-9-CM: 599.0  1/29/2020 - Present Unknown Gram-negative bacteremia ICD-10-CM: R78.81 ICD-9-CM: 790.7, 041.85  1/29/2020 - Present Unknown Leukocytosis ICD-10-CM: D61.980 ICD-9-CM: 288.60  1/29/2020 - Present Unknown Chronic pulmonary edema ICD-10-CM: J81.1 ICD-9-CM: 132  1/25/2020 - Present Unknown * (Principal) Fall ICD-10-CM: W19. Dina Mendez ICD-9-CM: E888.9  1/17/2020 - Present Yes Hematoma of right chest wall ICD-10-CM: U39.430U ICD-9-CM: 922.1  1/17/2020 - Present Yes Coagulopathy ([address] from Eliquis adn Indocin use ICD-10-CM: D68.9 ICD-9-CM: 286.9  1/17/2020 - Present Yes Acute pain of right knee ICD-10-CM: M25.561 ICD-9-CM: 719.46  1/17/2020 - Present Yes  Debility ICD-10-CM: R53.81 
 ICD-9-CM: 799.3  1/17/2020 - Present Yes Fluid overload ICD-10-CM: E87.70 ICD-9-CM: 276.69  1/17/2020 - Present Yes A-fib [facility]) ICD-10-CM: I48.91 
ICD-9-CM: 427.31  1/17/2020 - Present Unknown Hypotension ICD-10-CM: I95.9 ICD-9-CM: 458.9  1/17/2020 - Present Unknown Anticoagulated ICD-10-CM: Z79.01 
ICD-9-CM: V58.61  1/17/2020 - Present Unknown Chest wall hematoma ICD-10-CM: S20.219A 
ICD-9-CM: 922.1  1/17/2020 - Present Unknown Blood loss anemia ICD-10-CM: D50.0 ICD-9-CM: 280.0  1/17/2020 - Present Unknown Acute blood loss anemia ICD-10-CM: D62 
ICD-9-CM: 285.1  1/17/2020 - Present Unknown Subcutaneous hematoma ICD-10-CM: T14. 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 Coli, final ID/sens pending. Con't Cefepime. # MONTEZ 
 - Likely ATN 2/2 shock. Stable, Cr a little better today. Off IVFs now with h/o sCHF but encouraged PO intake. UOP is good. Baseline Cr is mid 1s. # Septic shock - Resolved. - 2/2 urinary source and GNR bacteremia - On cefepime. Wean solu-cortef. Off Levo. 
  
# Acute metabolic encephalopathy 
            - Resolved. 2/2 above. 
  
# AoC dCHF exacerbation - Resolved. Now off MIVFs.   
# AFib 
            - Con't BB. Off Eliquis per cardiology s/p fall with chest wall hematoma. No further intervention required. 
  
# HTN 
            - Low due to sepsis. 
  
# DM2 
            - Insulin DC planning/Dispo: Transfer to floor today. Placement when ready for dc. Diet:  DIET DIABETIC CONSISTENT CARB 
DVT ppx: SCDs only.  
 
Signed: 
Jagruti Moreno MD

## 2020-01-30 NOTE — PROGRESS NOTES
END OF SHIFT NOTE: 
 
Intake/Output 01/30 0701 - 01/30 1900 In: 340 [P.O.:340] Out: -  300 output Voiding: YES Catheter: YES Drain:   
 
 
 
 
 
Stool:  0 occurrences. Stool Assessment Stool Color: (Have not observed) (01/28/20 0820) Stool Appearance: Formed (01/21/20 1650) Stool Amount: Medium (01/21/20 1650) Stool Source/Status: Rectum (01/21/20 1650) Emesis:  0 occurrences. VITAL SIGNS Patient Vitals for the past 12 hrs: 
 Temp Pulse Resp BP SpO2  
01/30/20 1505 96.3 °F (35.7 °C) 83 20 149/83 98 % 01/30/20 1320 98 °F (36.7 °C) 78 22 140/73 96 % 01/30/20 1115 97.5 °F (36.4 °C)      
01/30/20 1036     98 % 01/30/20 0900  81 29 110/55 98 % 01/30/20 0834  79 13 102/57 98 % 01/30/20 0730 97.5 °F (36.4 °C) 83 22 141/79 99 % 01/30/20 0700  79 23 141/73 98 % 01/30/20 0630  78 21 120/66 98 % Pain Assessment Pain 1 Pain Scale 1: Numeric (0 - 10) (01/30/20 1512) Pain Intensity 1: 1 (01/30/20 1512) Patient Stated Pain Goal: 0 (01/30/20 1418) Pain Reassessment 1: Yes (01/30/20 1512) Pain Onset 1: ongoing (01/30/20 1036) Pain Location 1: Back (01/30/20 1418) Pain Orientation 1: Right (01/30/20 1039) Pain Description 1: Aching (01/30/20 1418) Pain Intervention(s) 1: Medication (see MAR) (01/30/20 1418) Ambulating No 
 
Additional Information: patient has been very uncomfortable. Pain controlled with norco 5-325 mg PO x 1 since coming to the 5th floor, received norco 5-325 mg x 1 PO and ultram 50 mg PO x 1 before coming to the floor today. Patient has been uncomfortable all day. Calling about every 20-30 minutes for repositioning. Patient does have wounds on the insides of each buttocks cheek. allevyn to sacrum and zinc barrier cream to wounds. Shift report to be given to oncoming nurse at the bedside.  
 
Amber Humphreys RN

## 2020-01-30 NOTE — PROGRESS NOTES
TRANSFER - IN REPORT: 
 
Verbal report received from SRAVAN Zhu(name) on Dain Molina  being received from ICU(unit) for routine progression of care Report consisted of patients Situation, Background, Assessment and  
Recommendations(SBAR). Information from the following report(s) SBAR, Kardex, Intake/Output, MAR, Recent Results and Med Rec Status was reviewed with the receiving nurse. Opportunity for questions and clarification was provided. Assessment completed upon patients arrival to unit and care assumed.

## 2020-01-30 NOTE — PROGRESS NOTES
A follow up visit was made to the patient. Emotional support, spiritual presence and  
prayer were provided for the patient. KASSIE Driscoll

## 2020-01-30 NOTE — PROGRESS NOTES
Problem: Patient Education: Go to Patient Education Activity Goal: Patient/Family Education Description 1. Patient will complete lower body bathing and dressing with mod A and adaptive equipment as needed. (UPDATED 1/30/2020) 2. Patient will complete toileting and toilet transfer with  mod A. (UPDATED 1/30/2020) 3. Patient will tolerate 23 minutes of OT treatment with 1-2 rest breaks to increase activity tolerance for ADLs. 4. Patient will complete functional transfers with  max  and adaptive equipment as needed. (UPDATED 1/30/2020) 5. Patient will complete functional activity while seated edge of bed unsupported with SBA and adaptive equipment as needed. (ADDED 1/30/2020) Timeframe: 7 visits Outcome: Progressing Towards Goal 
 
 
OCCUPATIONAL THERAPY: Daily Note, Re-evaluation and AM 1/30/2020 INPATIENT: OT Visit Days: 1 [place] / [place] / Product Type: Medicare /  
  
NAME/AGE/GENDER: Gus Pennington is a 70 y.o. male PRIMARY DIAGNOSIS:  Chest wall hematoma [S20.219A] Hypotension [I95.9] A-fib ([facility] [address] [I48.91] Anticoagulated [Z79.01] Acute blood loss anemia [D62] Subcutaneous hematoma [T14. Marcie Gaucher Fall Fall ICD-10: Treatment Diagnosis:  
 · Generalized Muscle Weakness (M62.81) · Other lack of cordination (R27.8) · Difficulty in walking, Not elsewhere classified (R26.2) Precautions/Allergies: 
  Fall precautions Clindamycin; Demerol [meperidine]; Losartan-hydrochlorothiazide; and Pcn [penicillins] ASSESSMENT:  
 
Mr. Jhonatan Flower presents for the above diagnoses. Upon arrival, pt supine in bed and agreeable to OT evaluation. Pt is alert and oriented x4. Currently on 6L02 via high flow nasal canula. Pt states that he has never requires supplemental 02 prior to hospitalization. Pt reports living with wife in a 1-story home with 2 steps to enter.  At baseline, pt was independent with ADLs and MOD I for household mobility with use of rollator; uses motorized scooter for community level distances. 1/30/2020: Mr. Parish James presents for re-eval in the ICU after decline in medical status. Upon arrival, pt supine in bed and agreeable to OT evaluation. Co-evaluation and treatment completed with PT today d/t pt's increased need for physical assistance and safety during functional mobility and self-care tasks. Currently requires max A x 2 for rolling and supine to sit to edge of bed. Static sitting balance is intact, however occasional support required for dynamic unsupported sitting. Poor neck control noted (d/t weakness); pt is able to lift head however unable to maintain posture for more than a few seconds. Attempted sit to stand x 2 with max A x 2. Pt able to clear bottom, however unable to stand into full upright posture with significant forward trunk flexion noted. Attempted sit to stand once more with max A x 3, however pt once was unable to achieve fully erect posture. Pt returned back to supine in bed with total A x 3. Required max A x 2 for rolling and scooting to [facility] for better positioning and comfort in bed. Pt left propped R side for pressure relief. Goals updated to reflect pt's current status. Will continue to address OT goals and plan of care as indicated. At this time, patient is appropriate for Co-treatment with physical  therapy due to patient's clinical complexity, decreased overall endurance/tolerance levels, as well as need for high level assistance and cues and intervention to complete functional transfers/mobility and functional tasks in safe manner. Chrisjignesh Michoacano is appropriate for a multidisciplinary co-treatment of PT and OT to address goals of both disciplines. This section established at most recent assessment PROBLEM LIST (Impairments causing functional limitations): 1. Decreased Strength 2. Decreased ADL/Functional Activities 3. Decreased Transfer Abilities 4. Decreased Ambulation Ability/Technique 5. Decreased Balance 6. Increased Pain 7. Decreased Activity Tolerance INTERVENTIONS PLANNED: (Benefits and precautions of occupational therapy have been discussed with the patient.) 1. Activities of daily living training 2. Adaptive equipment training 3. Balance training 4. Clothing management 5. Community reintergration 6. Donning&doffing training 7. Neuromuscular re-eduation 8. Re-evaluation 9. Therapeutic activity 10. Therapeutic exercise TREATMENT PLAN: Frequency/Duration: Follow patient 3x/week to address above goals. Rehabilitation Potential For Stated Goals: Good REHAB RECOMMENDATIONS (at time of discharge pending progress):   
Placement: It is my opinion, based on this patient's performance to date, that Mr. Jhonatan Flower may benefit from intensive therapy at a [address] [place] [address] after discharge due to the functional deficits listed above that are likely to improve with skilled rehabilitation and concerns that he/she may be unsafe to be unsupervised at home due to decline in ability to safely perform ADLs and functional mobility. . 
Equipment: ? TBD   
    
 
 
 
OCCUPATIONAL PROFILE AND HISTORY:  
History of Present Injury/Illness (Reason for Referral): 
See H&P Past Medical History/Comorbidities:  
Mr. Jhonatan Flower  has a past medical history of Arrhythmia, Arthritis, CAD (coronary artery disease), Cardiac pacemaker (11/12/2015), Chronic diastolic heart failure ([address] (11/12/2015), Chronic pain, Diabetes ([address] Diabetes mellitus type II, uncontrolled ([address] Dyspnea/shortness of breath (11/12/2015), GERD (gastroesophageal reflux disease), Heart failure ([address] HTN - uncontrolled, malignant (8/8/2016), Hyperlipidemia (11/12/2015), Hypertension, Malaise/fatigue/weakness/tiredness (11/12/2015), Mixed hyperlipidemia, Morbid obesity ([address] Orthostatic hypotension (11/12/2015), Other ill-defined conditions(799.89), Paroxysmal atrial fibrillation ([facility] [address] Permanent atrial fibrillation ([facility] [address] (11/12/2015), Tendon injury, and Unspecified sleep apnea. Mr. Beatris Jones  has a past surgical history that includes hx other surgical; hx hernia repair; pr left heart cath,percutaneous (3/31/2014); and hx pacemaker. Social History/Living Environment:  
Home Environment: Private residence # Steps to Enter: 1 One/Two Story Residence: One story Living Alone: No 
Support Systems: Spouse/Significant Other/Partner Patient Expects to be Discharged to[de-identified] Unknown Current DME Used/Available at Home: Aren Barr, rollator, Commode, bedside Prior Level of Function/Work/Activity: 
Independent with ADLs and MOD I for household mobility with use of rollator; uses motorized scooter for community level distances Personal Factors:   
      Sex:  male Age:  70 y.o. Other factors that influence how disability is experienced by the patient:  multiple co-morbidities Number of Personal Factors/Comorbidities that affect the Plan of Care: Brief history (0):  LOW COMPLEXITY ASSESSMENT OF OCCUPATIONAL PERFORMANCE[de-identified]  
Activities of Daily Living:  
Basic ADLs (From Assessment) Complex ADLs (From Assessment) Feeding: Setup Oral Facial Hygiene/Grooming: Minimum assistance Bathing: Maximum assistance Upper Body Dressing: Moderate assistance Lower Body Dressing: Maximum assistance Toileting: Maximum assistance Instrumental ADL Meal Preparation: Total assistance Homemaking: Total assistance Grooming/Bathing/Dressing Activities of Daily Living Cognitive Retraining Safety/Judgement: Awareness of environment; Fall prevention;Home safety Bed/Mat Mobility Rolling: Maximum assistance;Assist x2 Supine to Sit: Maximum assistance;Assist x2 Sit to Supine: Maximum assistance;Assist x2 Sit to Stand: Maximum assistance;Assist x2 Stand to Sit: Maximum assistance;Assist x2 Scooting: Maximum assistance;Assist x2  
 
 Most Recent Physical Functioning:  
Gross Assessment: 
AROM: Generally decreased, functional 
PROM: Generally decreased, functional 
Strength: Generally decreased, functional 
Coordination: Generally decreased, functional 
Tone: Normal 
Sensation: Intact Posture: 
Posture (WDL): (P) Exceptions to [facility] Balance: 
Sitting: Impaired Sitting - Static: Good (unsupported) Sitting - Dynamic: Fair (occasional) Standing: Impaired Standing - Static: Poor Standing - Dynamic : Poor Bed Mobility: 
Rolling: Maximum assistance;Assist x2 Supine to Sit: Maximum assistance;Assist x2 Sit to Supine: Maximum assistance;Assist x2 Scooting: Maximum assistance;Assist x2 Wheelchair Mobility: 
  
Transfers: 
Sit to Stand: Maximum assistance;Assist x2 Stand to Sit: Maximum assistance;Assist x2 Patient Vitals for the past 6 hrs: 
 BP BP Patient Position SpO2 O2 Flow Rate (L/min) Pulse 20 0529 113/59  98 %  77  
20 0559 119/61  98 %  77  
20 0630 120/66  98 %  78  
20 0700 141/73  98 % 2 l/min 79  
20 0730 141/79 At rest 99 % 2 l/min 83  
20 0834 102/57  98 %  79  
20 0900 110/55  98 %  81  
20 1036   98 % 2 l/min  Mental Status Neurologic State: Alert Orientation Level: Oriented X4 Cognition: Follows commands Perception: Appears intact Perseveration: No perseveration noted Safety/Judgement: Awareness of environment, Fall prevention, Home safety Physical Skills Involved: 
1. Balance 2. Strength 3. Activity Tolerance 4. Gross Motor Control Cognitive Skills Affected (resulting in the inability to perform in a timely and safe manner): 1. none  Psychosocial Skills Affected: 1. Habits/Routines 2. Environmental Adaptation Number of elements that affect the Plan of Care: 5+:  HIGH COMPLEXITY CLINICAL DECISION MAKIN [facility] [address] [id number] AM-PAC 6 Clicks Daily Activity Inpatient Short Form How much help from another person does the patient currently need. .. Total A Lot A Little None 1. Putting on and taking off regular lower body clothing? [] 1   [x] 2   [] 3   [] 4  
2. Bathing (including washing, rinsing, drying)? [] 1   [x] 2   [] 3   [] 4  
3. Toileting, which includes using toilet, bedpan or urinal?   [] 1   [x] 2   [] 3   [] 4  
4. Putting on and taking off regular upper body clothing? [] 1   [x] 2   [] 3   [] 4  
5. Taking care of personal grooming such as brushing teeth? [] 1   [] 2   [x] 3   [] 4  
6. Eating meals? [] 1   [] 2   [x] 3   [] 4  
© 2007, Trustees of [address] [place] [address] [address] [id number], under license to [employer]. All rights reserved Score:  Initial: 15 Most Recent:14 (Date: 1/30/2020) Interpretation of Tool:  Represents activities that are increasingly more difficult (i.e. Bed mobility, Transfers, Gait). Medical Necessity:    
· Patient demonstrates · good ·  rehab potential due to higher previous functional level. Reason for Services/Other Comments: 
· Patient continues to require skilled intervention due to · medical complications and patient unable to attend/participate in therapy as expected · . Use of outcome tool(s) and clinical judgement create a POC that gives a: LOW COMPLEXITY  
 
 
 
TREATMENT:  
(In addition to Assessment/Re-Assessment sessions the following treatments were rendered) Pre-treatment Symptoms/Complaints: \"It feels good being up. \" 
Pain: Initial:  
Pain Intensity 1: 2 Pain Location 1: Generalized Pain Orientation 1: Right/10 Post Session:  same Neuromuscular Re-education: ( 20  minutes):  Exercise/activities per grid below to improve balance, coordination, and posture. Required max visual, verbal, and tactile cues to promote static and dynamic balance in sitting and standing. Requires max A x 2 for rolling and supine to sit to edge of bed.  Static sitting balance is intact, however occasional support required for dynamic unsupported sitting. Poor neck control noted (d/t weakness); pt is able to lift head however unable to maintain posture for more than a few seconds. Attempted sit to stand x 2 with max A x 2. Pt able to clear bottom, however unable to stand into full upright posture with significant forward trunk flexion noted. Braces/Orthotics/Lines/Etc:  
· hooker catheter · ICU monitors · O2 Device: Hi flow nasal cannula Treatment/Session Assessment:   
· Response to Treatment:  tolerated well however decreased activity tolerance and 02 sats dropping with mobility. · Interdisciplinary Collaboration:  
o Physical Therapist 
o Occupational Therapist 
o Registered Nurse · After treatment position/precautions:  
o Supine in bed 
o Bed/Chair-wheels locked 
o Bed in low position 
o Call light within reach 
o RN notified · Compliance with Program/Exercises: Compliant all of the time, Will assess as treatment progresses. · Recommendations/Intent for next treatment session: \"Next visit will focus on advancements to more challenging activities and reduction in assistance provided\". Total Treatment Duration: OT Patient Time In/Time Out Time In: 0769 Time Out: 1106 Diann German, OT

## 2020-01-31 NOTE — PROGRESS NOTES
Date of Outreach Update: 
Polly Ambriz was seen and assessed. O2= 95% and HR= 72. Pt c/o inability to sleep at night. Previous Outreach assessment has been reviewed. There have been no significant clinical changes since the completion of the last dated Outreach assessment. Will continue to follow up per outreach protocol. Signed By:   Dennys Alamo   January 31, 2020 3:51 AM

## 2020-01-31 NOTE — PROGRESS NOTES
Nutrition Reason for assessment: [place] Day 6 Assessment:  
Diet: DIET CARDIAC Regular Food/Nutrition Patient History:   
Pt is 71 yo male who presented after having a fall at home. Upon arriving he was found to have large hematoma in chest wall, this has reduced on its own without surgery. Pt has PMH  Of DM and CHF. He was admitted to the unit with sepsis on 1/28. Transferred back to the floor yesterday. Spoke with RN prior to seeing patient and she states that patient is very confused. She states that he can self feed, but forgets and has to be redirected. Patient was seen and is confused, but does answer appropriately at times. He states that he has not been eating well because \"it does not agree with me. \" When questioned further, he states that food the taste of food is off. He does not provide any other information regarding intake and seems to be focused on the \"taste\" of things, even inedible items. When asked if he would drink a supplement he states that he could try it. Anthropometrics: 
 Height: 1.829 m , Weight: 152.3 kg (335 lb 12.2 oz), Weight Source: Bed, Body mass index is 41.97 kg/m². BMI class of obese. 
  
Macronutrient needs: CBW: 152.3kg EER: 5780-9663 kcal/day (11-14 kcals/kg ) EPR:  g/day (20% )  
  Intake/Comparative Standards: Per documentation, patient consuming average ~30% of  recorded meals in 7 days. This meets 25-50% of kcal needs and protein needs.  
  
Nutrition Diagnosis: 
Inadequate oral intake related to altered mental status and taste changes as evidenced by RN and patient reported barriers to PO intake, meeting needs as above. 
  
Nutrition Intervention: 
Meals and Snacks: Continue with current diet. Commercial Beverages and Supplements: Add Glucerna TID Coordination of Care: Fernie Rodgers Discharge Nutrition Plan: Too soon to determine [address], [address]

## 2020-01-31 NOTE — PROGRESS NOTES
[facility] OUTREACH NURSE PROGRESS REPORT SUBJECTIVE: Called to assess patient secondary to outreach protocol. MEWS Score: 1 (01/30/20 1915) Vitals:  
 01/30/20 1320 01/30/20 1505 01/30/20 1915 01/30/20 2315 BP: 140/73 149/83 144/82 158/83 Pulse: 78 83 84 80 Resp: 22 20 20 20 Temp: 98 °F (36.7 °C) 96.3 °F (35.7 °C) 97.6 °F (36.4 °C) 97.3 °F (36.3 °C) SpO2: 96% 98% 99% 92% Weight:      
Height:      
  
 
LAB DATA: 
 
Recent Labs  
  01/30/20 0348 01/29/20 0417 01/28/20 
1534  137 136  
K 4.7 5.3* 5.9*  
 103 101 CO2 25 24 25 AGAP 8 10 10 * 176* 183* BUN 55* 44* 36* CREA 3.21* 3.82* 3.45* GFRAA 24* 20* 22* GFRNA 20* 16* 18* CA 7.8* 8.1* 8.5 ALB  --  1.9*  --   
TP  --  5.9*  --   
GLOB  --  4.0*  --   
AGRAT  --  0.5*  --   
ALT  --  93*  --   
  
 
Recent Labs  
  01/30/20 0348 01/29/20 0417 01/28/20 
1534 WBC 14.3* 23.8* 25.4* HGB 8.3* 9.1* 8.8* HCT 27.2* 30.1* 31.3*  
 283 353 OBJECTIVE: On arrival to room, I found patient to be alert in bed. Pain Assessment Pain Intensity 1: 0 (01/30/20 2200) Pain Location 1: Back Pain Intervention(s) 1: Medication (see MAR) Patient Stated Pain Goal: 0 
 
  
  
  
  
 
  
  
  
   
 
ASSESSMENT:  Pt is alert and oriented. C/o chronic pain in his back and hips 8/10. Per pt pain meds had been given. O2= 96% on 2L NC. HR= 89. VSS PLAN:  Will continue to monitor per protocol.

## 2020-01-31 NOTE — PROGRESS NOTES
Date of Outreach Update: 
Gwendolyn Murillo was seen and assessed. MEWS Score: 1 (01/31/20 1505) Vitals:  
 01/31/20 0340 01/31/20 0720 01/31/20 1126 01/31/20 1505 BP: 156/72 (!) 162/100 (!) 173/100 151/80 Pulse: 84 82 83 81 Resp: 18 20 21 19 Temp: 98.1 °F (36.7 °C) 97.5 °F (36.4 °C) 97.4 °F (36.3 °C) 97.6 °F (36.4 °C) SpO2: 95% 94% 98% 92% Weight:      
Height:      
  
 
 Pain Assessment Pain Intensity 1: 0 (01/31/20 1455) Pain Location 1: Hip Pain Intervention(s) 1: Medication (see MAR) Patient Stated Pain Goal: 0 Previous Outreach assessment has been reviewed. There have been no significant clinical changes since the completion of the last dated Outreach assessment. Will continue to follow up per outreach protocol. Signed By:   Jeromy Jason   January 31, 2020 6:10 PM

## 2020-01-31 NOTE — PROGRESS NOTES
Progress Note Patient: Alicia Arteaga MRN: [id number]  SSN: [id number] YOB: 1940  Age: 70 y.o. Sex: male Admit Date: 1/17/2020 LOS: 14 days Subjective: PMH of DM type 2, AF on Eliquis, obesity, HFpEF, fall Admitted due to a fall. Found to have right chest wall hematoma, anemia. Hypotension, coagulopathy, He received PRC transfusion. Surgery saw the patient and no intervention is needed. He developed pulmonary edema, requiring Lasix. Cardiology was consulted. Agreeable to stopping Eliquis. Echo with EF 20-25%, LVH. Patient has sepsis with UTI and is being treated for that. He is feeling better. Objective:  
 
Vitals:  
 01/31/20 0124 01/31/20 0340 01/31/20 0720 01/31/20 1126 BP:  156/72 (!) 162/100 (!) 173/100 Pulse:  84 82 83 Resp:  18 20 21 Temp:  98.1 °F (36.7 °C) 97.5 °F (36.4 °C) 97.4 °F (36.3 °C) SpO2: 94% 95% 94% 98% Weight:      
Height:      
  
 
Intake and Output: 
Current Shift: 01/31 0701 - 01/31 1900 In: 240 [P.O.:240] Out: 450 [Urine:450] Last three shifts: 01/29 1901 - 01/31 0700 In: 700 [P.O.:700] Out: 1900 [ZXARE:0530] Physical Exam:  
 
General:                    The patient is an elderly male in no acute distress. Obese. Head:                                   Normocephalic/atraumatic. Eyes:                                   palpebral pallor, no scleral icterus. ENT:                                    External auricular and nasal exam within normal limits. Mucous membranes are moist. 
Neck:                                   Supple, non-tender, no JVD. Right chest wall hematoma Lungs:                       decreased to auscultation bilaterally without wheezes or crackles. No respiratory distress or accessory muscle use.  
Heart:                                  Regular rate and rhythm, without murmurs, rubs, or gallops. Abdomen:                  Soft, non-tender, non-distended with normoactive bowel sounds. Genitourinary:           No tenderness over the bladder or bilateral CVAs. Extremities:               Without clubbing, cyanosis, or edema. Skin:                                    Normal color, texture, and turgor. No rashes, lesions, or jaundice. Pulses:                      Radial and dorsalis pedis pulses present 2+ bilaterally. Capillary refill <2s. Neurologic:                CN II-XII grossly intact and symmetrical.  
                                            Moving all four extremities well with no focal deficits. Psychiatric:                Pleasant demeanor, appropriate affect. Alert and oriented x 3 Lab/Data Review: 
 
Recent Results (from the past 24 hour(s)) GLUCOSE, POC Collection Time: 01/30/20  4:34 PM  
Result Value Ref Range Glucose (POC) 207 (H) 65 - 100 mg/dL GLUCOSE, POC Collection Time: 01/30/20  8:38 PM  
Result Value Ref Range Glucose (POC) 143 (H) 65 - 100 mg/dL METABOLIC PANEL, BASIC Collection Time: 01/31/20  4:21 AM  
Result Value Ref Range Sodium 136 136 - 145 mmol/L Potassium 4.7 3.5 - 5.1 mmol/L Chloride 103 98 - 107 mmol/L  
 CO2 24 21 - 32 mmol/L Anion gap 9 7 - 16 mmol/L Glucose 152 (H) 65 - 100 mg/dL BUN 57 (H) 8 - 23 MG/DL Creatinine 2.48 (H) 0.8 - 1.5 MG/DL  
 GFR est AA 33 (L) >60 ml/min/1.73m2 GFR est non-AA 27 (L) >60 ml/min/1.73m2 Calcium 8.2 (L) 8.3 - 10.4 MG/DL  
CBC WITH AUTOMATED DIFF Collection Time: 01/31/20  4:21 AM  
Result Value Ref Range WBC 14.8 (H) 4.3 - 11.1 K/uL  
 RBC 3.73 (L) 4.23 - 5.6 M/uL HGB 8.9 (L) 13.6 - 17.2 g/dL HCT 29.4 (L) 41.1 - 50.3 % MCV 78.8 (L) 79.6 - 97.8 FL  
 MCH 23.9 (L) 26.1 - 32.9 PG  
 MCHC 30.3 (L) 31.4 - 35.0 g/dL RDW 23.7 (H) 11.9 - 14.6 % PLATELET 965 696 - 418 K/uL  MPV 11.5 9.4 - 12.3 FL  
 ABSOLUTE NRBC 0.03 0.0 - 0.2 K/uL  
 DF AUTOMATED NEUTROPHILS 91 (H) 43 - 78 % LYMPHOCYTES 4 (L) 13 - 44 % MONOCYTES 5 4.0 - 12.0 % EOSINOPHILS 0 (L) 0.5 - 7.8 % BASOPHILS 0 0.0 - 2.0 % IMMATURE GRANULOCYTES 1 0.0 - 5.0 %  
 ABS. NEUTROPHILS 13.5 (H) 1.7 - 8.2 K/UL  
 ABS. LYMPHOCYTES 0.5 0.5 - 4.6 K/UL  
 ABS. MONOCYTES 0.8 0.1 - 1.3 K/UL  
 ABS. EOSINOPHILS 0.0 0.0 - 0.8 K/UL  
 ABS. BASOPHILS 0.0 0.0 - 0.2 K/UL  
 ABS. IMM. GRANS. 0.1 0.0 - 0.5 K/UL GLUCOSE, POC Collection Time: 01/31/20  7:39 AM  
Result Value Ref Range Glucose (POC) 154 (H) 65 - 100 mg/dL GLUCOSE, POC Collection Time: 01/31/20 11:23 AM  
Result Value Ref Range Glucose (POC) 106 (H) 65 - 100 mg/dL CT head 
1- IMPRESSION:  Negative for acute intracranial abnormality. Chronic changes. XR chest  
1- IMPRESSION:  
1. New central line, without evidence of a pneumothorax. 2. Unchanged bibasilar lung edema or infiltrates. Duplex legs 1- IMPRESSION:  
1. No evidence of DVT in either leg. 2. Bilateral knee Baker's cysts. Results Procedure Component Value Units Date/Time CULTURE, URINE [[id number]]  (Abnormal)  (Susceptibility) Collected:  01/28/20 0401 Order Status:  Completed Specimen:  Clean catch Updated:  01/31/20 9087 Special Requests: NO SPECIAL REQUESTS Culture result:    
  >100,000 COLONIES/mL ESCHERICHIA COLI  
     
      
  10,000 to 50,000 COLONIES/mL MIXED SKIN JENNIFER ISOLATED Susceptibility Escherichia coli THOMAS Ampicillin ($) Resistant Ampicillin/sulbactam ($) Intermediate Aztreonam ($$$$) Susceptible Cefazolin ($) Susceptible Cefepime ($$) Susceptible Cefoxitin Susceptible Ceftriaxone ($) Susceptible Gentamicin ($) Susceptible Nitrofurantoin Susceptible Piperacillin/Tazobac ($) Susceptible Tobramycin ($) Susceptible Trimeth-Sulfamethoxa Resistant CULTURE, BLOOD [[id number]]  (Abnormal)  (Susceptibility) Collected:  01/28/20 0034 Order Status:  Completed Specimen:  Blood Updated:  01/30/20 1351 Special Requests: --     
  RIGHT 
HAND 
  
  GRAM STAIN GRAM NEGATIVE RODS ANAEROBIC BOTTLE POSITIVE RESULTS VERIFIED, PHONED TO AND READ BACK  Rose Agee RN @8840 ON [id number] TF Culture result: ESCHERICHIA COLI     
   REFER TO BIOFIRE PANEL [id number] CULTURE IN PROGRESS,FURTHER UPDATES TO FOLLOW Susceptibility Escherichia coli THOMAS (Preliminary) Amikacin ($) Susceptible Ampicillin ($) Resistant Aztreonam ($$$$) Susceptible Cefazolin ($) Resistant Cefepime ($$) Susceptible Cefoxitin Susceptible Ceftriaxone ($) Susceptible Ciprofloxacin ($) Susceptible Gentamicin ($) Resistant Levofloxacin ($) Susceptible Meropenem ($$) Susceptible Piperacillin/Tazobac ($) Susceptible Tobramycin ($) Intermediate Trimeth-Sulfamethoxa Resistant CULTURE, BLOOD [[id number]] Collected:  01/28/20 0034 Order Status:  Completed Specimen:  Blood Updated:  01/31/20 0944 Special Requests: --     
  LEFT 
HAND Culture result: NO GROWTH 3 DAYS     
 BLOOD CULTURE ID PANEL [[id number]]  (Abnormal) Collected:  01/28/20 0034 Order Status:  Completed Specimen:  Blood Updated:  01/29/20 0754 Acc. no. from [facility] [id number] Escherichia coli DETECTED Comment: RESULTS VERIFIED, PHONED TO AND READ BACK BY 
MOJGAN TELLES RN AT 9181 1.29.20 CW 
  
  
  KPC (Carbapenem Resistance Gene) NOT DETECTED Comment: WARNING:  A Not Detected result for the KPC gene does not indicate susceptibility to carbapenems. Gram negative bacteria can be resistant to carbapenems by mechanisms other than carrying the KPC gene. INTERPRETATION Gram negative cheryle. Identified by realtime PCR as E. coli Current Facility-Administered Medications:  
  traMADol (ULTRAM) tablet 50 mg, 50 mg, Oral, Q6H PRN, Sharon Raymundo MD, 50 mg at 01/31/20 0617 
  magic mouthwash (CINDY) oral suspension 5 mL, 5 mL, Oral, Q4H, Sharon Raymundo MD, 5 mL at 01/31/20 8058   cefTRIAXone (ROCEPHIN) 2 g in 0.9% sodium chloride (MBP/ADV) 50 mL, 2 g, IntraVENous, Q24H, Sharon Raymundo MD, Last Rate: 100 mL/hr at 01/31/20 0754, 2 g at 01/31/20 7253   acetaminophen (TYLENOL) tablet 650 mg, 650 mg, Oral, Q6H PRN, Tommie Santos MD, 650 mg at 01/31/20 0800 
  insulin lispro (HUMALOG) injection 5 Units, 5 Units, SubCUTAneous, TIDAC, Tommie Santos MD, 5 Units at 01/31/20 0800 
  HYDROcodone-acetaminophen (NORCO) 5-325 mg per tablet 1 Tab, 1 Tab, Oral, Q6H PRN, Tommie Santos MD, 1 Tab at 01/31/20 7526   sotalol (BETAPACE) tablet 80 mg, 80 mg, Oral, Q12H, Idania Wade MD, 80 mg at 01/31/20 0800 
  nystatin (MYCOSTATIN) 100,000 unit/gram powder, , Topical, BID, Idania Wade MD 
  0.9% sodium chloride infusion 250 mL, 250 mL, IntraVENous, PRN, Tommie Santos MD 
  albuterol-ipratropium (DUO-NEB) 2.5 MG-0.5 MG/3 ML, 3 mL, Nebulization, Q4H PRN, Tommie Santos MD, 3 mL at 01/20/20 2352   insulin lispro (HUMALOG) injection, , SubCUTAneous, AC&HS, Tommie Santos MD, Stopped at 01/31/20 1137 
  ondansetron (ZOFRAN) injection 4 mg, 4 mg, IntraVENous, Q6H PRN, Tommie Santos MD 
 
 
Assessment:  
 
Principal Problem: 
  Fall (1/17/2020) Active Problems: 
  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 Hematoma of right chest wall (1/17/2020) Coagulopathy ([address] from Eliquis adn Indocin use (1/17/2020) Acute pain of right knee (1/17/2020) Debility (1/17/2020) Fluid overload (1/17/2020) A-fib ([address] (1/17/2020) Hypotension (1/17/2020) Anticoagulated (1/17/2020) Chest wall hematoma (1/17/2020) Blood loss anemia (1/17/2020) Acute blood loss anemia (1/17/2020) Subcutaneous hematoma (1/17/2020) Chronic pulmonary edema (1/25/2020) Septic shock ([facility] (1/29/2020) UTI (urinary tract infection) (1/29/2020) Gram-negative bacteremia (1/29/2020) MONTEZ (acute kidney injury) ([facility] [facility] (1/30/2020) Plan:  
 
Bacteremia Due to UTI with septic shock. Shock is improved. Grew E.coli in urine. Continue current Ceftriaxone. MONTEZ Improving. Monitor renal function and intake and output. Avoid nephrotoxic agents. CHF exacerbation. Improved. Acute metabolic encephalopathy Improved. alert and oriented to place, time and person now. AF Off [address] due to hematoma and risk of falls. Rate is controlled. Diabetes mellitus type 2 Monitor blood sugar. Cover with insulin sliding scale accordingly. I have discussed the plan of care with patient. DVT prophylaxis : SCD Signed By: Katia Cunningham MD   
 January 31, 2020

## 2020-01-31 NOTE — PROGRESS NOTES
Problem: Diabetes Self-Management Goal: *Disease process and treatment process Description Define diabetes and identify own type of diabetes; list 3 options for treating diabetes. Outcome: Progressing Towards Goal 
Goal: *Incorporating nutritional management into lifestyle Description Describe effect of type, amount and timing of food on blood glucose; list 3 methods for planning meals. Outcome: Progressing Towards Goal 
Goal: *Incorporating physical activity into lifestyle Description State effect of exercise on blood glucose levels. Outcome: Progressing Towards Goal 
Goal: *Developing strategies to promote health/change behavior Description Define the ABC's of diabetes; identify appropriate screenings, schedule and personal plan for screenings. Outcome: Progressing Towards Goal 
Goal: *Using medications safely Description State effect of diabetes medications on diabetes; name diabetes medication taking, action and side effects. Outcome: Progressing Towards Goal 
Goal: *Monitoring blood glucose, interpreting and using results Description Identify recommended blood glucose targets  and personal targets. Outcome: Progressing Towards Goal 
Goal: *Prevention, detection, treatment of acute complications Description List symptoms of hyper- and hypoglycemia; describe how to treat low blood sugar and actions for lowering  high blood glucose level. Outcome: Progressing Towards Goal 
Goal: *Prevention, detection and treatment of chronic complications Description Define the natural course of diabetes and describe the relationship of blood glucose levels to long term complications of diabetes. Outcome: Progressing Towards Goal 
Goal: *Developing strategies to address psychosocial issues Description Describe feelings about living with diabetes; identify support needed and support network Outcome: Progressing Towards Goal 
Goal: *Sick day guidelines Outcome: Progressing Towards Goal 
  
 Problem: Patient Education: Go to Patient Education Activity Goal: Patient/Family Education Outcome: Progressing Towards Goal 
  
Problem: Falls - Risk of 
Goal: *Absence of Falls Description Document Edgar Brunner Fall Risk and appropriate interventions in the flowsheet. Outcome: Progressing Towards Goal 
Note: Fall Risk Interventions: 
  
 
Mentation Interventions: Adequate sleep, hydration, pain control, Bed/chair exit alarm, Door open when patient unattended, Evaluate medications/consider consulting pharmacy, Eyeglasses and hearing aids, Familiar objects from home, Gait belt with transfers/ambulation, Increase mobility, More frequent rounding, Room close to nurse's station, Reorient patient, Self-releasing belt Medication Interventions: Bed/chair exit alarm, Evaluate medications/consider consulting pharmacy, Teach patient to arise slowly, Patient to call before getting OOB, Utilize gait belt for transfers/ambulation Elimination Interventions: Call light in reach, Elevated toilet seat, Bed/chair exit alarm, Patient to call for help with toileting needs, Toilet paper/wipes in reach, Toileting schedule/hourly rounds, Stay With Me (per policy) History of Falls Interventions: Consult care management for discharge planning, Door open when patient unattended, Evaluate medications/consider consulting pharmacy, Bed/chair exit alarm, Investigate reason for fall, Utilize gait belt for transfer/ambulation, Room close to nurse's station Problem: Patient Education: Go to Patient Education Activity Goal: Patient/Family Education Outcome: Progressing Towards Goal 
  
Problem: Pressure Injury - Risk of 
Goal: *Prevention of pressure injury Description Document Zion Scale and appropriate interventions in the flowsheet. Outcome: Progressing Towards Goal 
Note: Pressure Injury Interventions: 
Sensory Interventions: Discuss PT/OT consult with provider Moisture Interventions: Apply protective barrier, creams and emollients Activity Interventions: Increase time out of bed, Pressure redistribution bed/mattress(bed type) Mobility Interventions: Pressure redistribution bed/mattress (bed type) Nutrition Interventions: Document food/fluid/supplement intake, Discuss nutritional consult with provider, Offer support with meals,snacks and hydration Friction and Shear Interventions: Apply protective barrier, creams and emollients, Foam dressings/transparent film/skin sealants, HOB 30 degrees or less, Lift sheet, Minimize layers, Transferring/repositioning devices Problem: Patient Education: Go to Patient Education Activity Goal: Patient/Family Education Outcome: Progressing Towards Goal 
  
Problem: Pain Goal: *Control of Pain Outcome: Progressing Towards Goal 
  
Problem: Patient Education: Go to Patient Education Activity Goal: Patient/Family Education Outcome: Progressing Towards Goal 
  
Problem: Hypertension Goal: *Blood pressure within specified parameters Outcome: Progressing Towards Goal 
Goal: *Fluid volume balance Outcome: Progressing Towards Goal 
Goal: *Labs within defined limits Outcome: Progressing Towards Goal 
  
Problem: Patient Education: Go to Patient Education Activity Goal: Patient/Family Education Outcome: Progressing Towards Goal 
  
Problem: High Risk or History of CONNOR Goal: Recognition of CONNOR or High Risk for CONNOR Outcome: Progressing Towards Goal 
Goal: Maintain Patent Airway and Adequate Oxygenation Outcome: Progressing Towards Goal 
Goal: Avoid Over-sedation Outcome: Progressing Towards Goal 
Goal: Maintenance Care of CONNOR Outcome: Progressing Towards Goal 
  
Problem: Patient Education: Go to Patient Education Activity Goal: Patient/Family Education Outcome: Progressing Towards Goal 
  
Problem: Breathing Pattern - Ineffective Goal: *Absence of hypoxia Outcome: Progressing Towards Goal 
 Goal: *Use of effective breathing techniques Outcome: Progressing Towards Goal 
  
Problem: Patient Education: Go to Patient Education Activity Goal: Patient/Family Education Outcome: Progressing Towards Goal 
  
Problem: Patient Education: Go to Patient Education Activity Goal: Patient/Family Education Outcome: Progressing Towards Goal 
  
Problem: Heart Failure: Day 5 Goal: Off Pathway (Use only if patient is Off Pathway) Outcome: Progressing Towards Goal 
Goal: Activity/Safety Outcome: Progressing Towards Goal 
Goal: Diagnostic Test/Procedures Outcome: Progressing Towards Goal 
Goal: Nutrition/Diet Outcome: Progressing Towards Goal 
Goal: Discharge Planning Outcome: Progressing Towards Goal 
Goal: Medications Outcome: Progressing Towards Goal 
Goal: Respiratory Outcome: Progressing Towards Goal 
Goal: Treatments/Interventions/Procedures Outcome: Progressing Towards Goal 
Goal: Psychosocial 
Outcome: Progressing Towards Goal 
  
Problem: Heart Failure: Discharge Outcomes Goal: *Demonstrates ability to perform prescribed activity without shortness of breath or discomfort Outcome: Progressing Towards Goal 
Goal: *Left ventricular function assessment completed prior to or during stay, or planned for post-discharge Outcome: Progressing Towards Goal 
Goal: *ACEI prescribed if LVEF less than 40% and no contraindications or ARB prescribed Outcome: Progressing Towards Goal 
Goal: *Verbalizes understanding and describes prescribed diet Outcome: Progressing Towards Goal 
Goal: *Verbalizes understanding/describes prescribed medications Outcome: Progressing Towards Goal 
Goal: *Describes available resources and support systems Description 
(eg: Home Health, Palliative Care, Advanced Medical Directive) Outcome: Progressing Towards Goal 
Goal: *Describes smoking cessation resources Outcome: Progressing Towards Goal 
Goal: *Understands and describes signs and symptoms to report to providers(Stroke Metric) Outcome: Progressing Towards Goal 
Goal: *Describes/verbalizes understanding of follow-up/return appt Description 
(eg: to physicians, diabetes treatment coordinator, and other resources Outcome: Progressing Towards Goal 
Goal: *Describes importance of continuing daily weights and changes to report to physician Outcome: Progressing Towards Goal 
  
Problem: Patient Education: Go to Patient Education Activity Goal: Patient/Family Education Outcome: Progressing Towards Goal 
  
Problem: Patient Education: Go to Patient Education Activity Goal: Patient/Family Education Description 1. Patient will complete lower body bathing and dressing with min A and adaptive equipment as needed. 2. Patient will complete toileting and toilet transfer with min A.  
3. Patient will tolerate 23 minutes of OT treatment with 1-2 rest breaks to increase activity tolerance for ADLs. 4. Patient will complete functional transfers with mod A and adaptive equipment as needed. Timeframe: 7 visits Outcome: Progressing Towards Goal 
  
Problem: Delirium Treatment Goal: *Level of consciousness restored to baseline Outcome: Progressing Towards Goal 
Goal: *Level of environmental perceptions restored to baseline Outcome: Progressing Towards Goal 
Goal: *Sensory perception restored to baseline Outcome: Progressing Towards Goal 
Goal: *Emotional stability restored to baseline Outcome: Progressing Towards Goal 
Goal: *Functional assessment restored to baseline Outcome: Progressing Towards Goal 
Goal: *Absence of falls Outcome: Progressing Towards Goal 
Goal: *Will remain free of delirium, CAM Score negative Outcome: Progressing Towards Goal 
Goal: *Cognitive status will be restored to baseline Outcome: Progressing Towards Goal 
Goal: Interventions Outcome: Progressing Towards Goal 
  
Problem: Patient Education: Go to Patient Education Activity Goal: Patient/Family Education Outcome: Progressing Towards Goal

## 2020-01-31 NOTE — PROGRESS NOTES
0643-Bedside report received from Christian Urban RN. Resting in bed. No needs voiced. No s/s of acute distress. 1810-END OF SHIFT NOTE: 
Pt's VSS and is in no acute distress. Pt waiting on placement for SNF. Pt confused most of the day. Intake/Output 01/31 0701 - 01/31 1900 In: 240 [P.O.:240] Out: 450 [Urine:450] Voiding: NO 
Catheter: YES Drain:   
 
Stool:  0 occurrences. Stool Assessment Stool Color: (Have not observed) (01/28/20 0820) Stool Appearance: Formed (01/21/20 1650) Stool Amount: Medium (01/21/20 1650) Stool Source/Status: Rectum (01/21/20 1650) Emesis:  0 occurrences. VITAL SIGNS Patient Vitals for the past 12 hrs: 
 Temp Pulse Resp BP SpO2  
01/31/20 1505 97.6 °F (36.4 °C) 81 19 151/80 92 % 01/31/20 1126 97.4 °F (36.3 °C) 83 21 (!) 173/100 98 % 01/31/20 0720 97.5 °F (36.4 °C) 82 20 (!) 162/100 94 % Pain Assessment Pain 1 Pain Scale 1: Numeric (0 - 10) (01/31/20 1455) Pain Intensity 1: 0 (01/31/20 1455) Patient Stated Pain Goal: 0 (01/30/20 1418) Pain Reassessment 1: Yes (01/31/20 0900) Pain Onset 1: ongoing (01/30/20 1036) Pain Location 1: Hip (01/31/20 0801) Pain Orientation 1: Right (01/31/20 0801) Pain Description 1: Aching (01/31/20 0801) Pain Intervention(s) 1: Medication (see MAR) (01/31/20 0801) Ambulating No 
 
 
[employer] 1830-Bedside shift change report given to Christian Urban RN (oncoming nurse) by Elmer Vazquez RN (offgoing nurse). Report included the following information SBAR, Kardex, Intake/Output, MAR and Recent Results.

## 2020-01-31 NOTE — PROGRESS NOTES
01/31/20 0124 Oxygen Therapy O2 Sat (%) 94 % Pulse via Oximetry 84 beats per minute O2 Device Nasal cannula O2 Flow Rate (L/min) 2 l/min Pt has refused times two nights to wear FFM BILEVEL device

## 2020-02-01 NOTE — PROGRESS NOTES
Pt wife states upper dentures are missing. Pt diet changed and he did eat some dinner. He needs assistance setting up and to be offered drinks. Pt has a pressure wound that was cleaned and alevyn changed Pt needs a bariatric bed but there weren't any on the 9th floor.

## 2020-02-01 NOTE — PROGRESS NOTES
01/31/20 2341 Oxygen Therapy O2 Sat (%) 97 % Pulse via Oximetry 71 beats per minute O2 Device Nasal cannula O2 Flow Rate (L/min) 2 l/min RT attempted to place pt on bipap, but pt stated he could not tolerate it due to it \"pulsing\".  The masked was readjusted, but pt requested to be taken off and was put back on O2 via NC. 
 Has been resistant to antibiotic management with development of for superficial abscesses   Will continue Vanco/Levaquin, along with PO Vanco for c diff prophylaxis  Wound culture growing Staph that is sensitive to Ancef - patient received 1 week of therapy - but may have been insufficient wound cultures given worsening erythema  Discussed with Infectious Disease and surgery, will need ID either today or tomorrow  Podiatry input appreciated  Venous Dopplers of right lower extremity negative for DVT

## 2020-02-01 NOTE — PROGRESS NOTES
Critical Care Outreach Nurse Progress Report: 
 
Subjective: In to assess pt secondary to transfer from ICU. MEWS Score: 1 (01/31/20 2007) Vitals:  
 01/31/20 0720 01/31/20 1126 01/31/20 1505 01/31/20 2007 BP: (!) 162/100 (!) 173/100 151/80 144/64 Pulse: 82 83 81 86 Resp: 20 21 19 18 Temp: 97.5 °F (36.4 °C) 97.4 °F (36.3 °C) 97.6 °F (36.4 °C) 97.5 °F (36.4 °C) SpO2: 94% 98% 92% 96% Weight:      
Height:      
  
 
LAB DATA: 
 
Recent Labs  
  01/31/20 0421 01/30/20 
0348 01/29/20 
0417  136 137  
K 4.7 4.7 5.3*  
 103 103 CO2 24 25 24 AGAP 9 8 10 * 181* 176* BUN 57* 55* 44* CREA 2.48* 3.21* 3.82* GFRAA 33* 24* 20* GFRNA 27* 20* 16*  
CA 8.2* 7.8* 8.1* ALB  --   --  1.9* TP  --   --  5.9*  
GLOB  --   --  4.0* AGRAT  --   --  0.5* ALT  --   --  93* Recent Labs  
  01/31/20 
0421 01/30/20 
0348 01/29/20 
1290 WBC 14.8* 14.3* 23.8* HGB 8.9* 8.3* 9.1*  
HCT 29.4* 27.2* 30.1*  
 195 283 Assessment: Pt drowsy but easily arousable. Oriented to person and time. Pt appears to be in NAD at this time. O2 sat 96% on 3L NC. Pt resting in bed, no visitors at bedside. Plan: Will continue to follow per outreach program protocol. Jasson Lopez RN.

## 2020-02-01 NOTE — PROGRESS NOTES
Critical Care Outreach Nurse Progress Report: 
 
Subjective: In to assess pt secondary to f/u ICU transfer. MEWS Score: 1 (02/01/20 1107) Vitals:  
 01/31/20 4504 02/01/20 0257 02/01/20 6029 02/01/20 1107 BP:  156/86 145/86 156/81 Pulse:  86 86 84 Resp:  20 20 20 Temp:  96.3 °F (35.7 °C) 98.6 °F (37 °C) 97.8 °F (36.6 °C) SpO2: 97% 91% 94% 94% Weight:      
Height:      
  
 
Objective: Pt lying quietly in bed, in NAD. Pain Intensity 1: 0 (01/31/20 2324) Pain Location 1: Hip Pain Intervention(s) 1: Medication (see MAR) Patient Stated Pain Goal: 0 Assessment: Alert and oriented to person and place. Lung sounds clear in upper, dim in bases. O2 Sat 96% on 1L NC, HR 81. BP stable. No complaints voiced. Plan: Will follow per outreach protocol.

## 2020-02-01 NOTE — PROGRESS NOTES
Problem: Diabetes Self-Management Goal: *Disease process and treatment process Description Define diabetes and identify own type of diabetes; list 3 options for treating diabetes. Outcome: Progressing Towards Goal 
Goal: *Incorporating nutritional management into lifestyle Description Describe effect of type, amount and timing of food on blood glucose; list 3 methods for planning meals. Outcome: Progressing Towards Goal 
Goal: *Incorporating physical activity into lifestyle Description State effect of exercise on blood glucose levels. Outcome: Progressing Towards Goal 
Goal: *Developing strategies to promote health/change behavior Description Define the ABC's of diabetes; identify appropriate screenings, schedule and personal plan for screenings. Outcome: Progressing Towards Goal 
Goal: *Using medications safely Description State effect of diabetes medications on diabetes; name diabetes medication taking, action and side effects. Outcome: Progressing Towards Goal 
Goal: *Monitoring blood glucose, interpreting and using results Description Identify recommended blood glucose targets  and personal targets. Outcome: Progressing Towards Goal 
Goal: *Prevention, detection, treatment of acute complications Description List symptoms of hyper- and hypoglycemia; describe how to treat low blood sugar and actions for lowering  high blood glucose level. Outcome: Progressing Towards Goal 
Goal: *Prevention, detection and treatment of chronic complications Description Define the natural course of diabetes and describe the relationship of blood glucose levels to long term complications of diabetes. Outcome: Progressing Towards Goal 
Goal: *Developing strategies to address psychosocial issues Description Describe feelings about living with diabetes; identify support needed and support network Outcome: Progressing Towards Goal 
Goal: *Sick day guidelines Outcome: Progressing Towards Goal 
  
 Problem: Patient Education: Go to Patient Education Activity Goal: Patient/Family Education Outcome: Progressing Towards Goal 
  
Problem: Falls - Risk of 
Goal: *Absence of Falls Description Document Neil Mix Fall Risk and appropriate interventions in the flowsheet. Outcome: Progressing Towards Goal 
Note: Fall Risk Interventions: 
Mobility Interventions: Communicate number of staff needed for ambulation/transfer, Patient to call before getting OOB, PT Consult for mobility concerns Mentation Interventions: Adequate sleep, hydration, pain control, Room close to nurse's station, Reorient patient, Door open when patient unattended Medication Interventions: Utilize gait belt for transfers/ambulation, Evaluate medications/consider consulting pharmacy, Bed/chair exit alarm Elimination Interventions: Patient to call for help with toileting needs, Call light in reach, Bed/chair exit alarm History of Falls Interventions: Door open when patient unattended, Room close to nurse's station, Utilize gait belt for transfer/ambulation Problem: Patient Education: Go to Patient Education Activity Goal: Patient/Family Education Outcome: Progressing Towards Goal 
  
Problem: Pressure Injury - Risk of 
Goal: *Prevention of pressure injury Description Document Zion Scale and appropriate interventions in the flowsheet. Outcome: Progressing Towards Goal 
Note: Pressure Injury Interventions: 
Sensory Interventions: Assess need for specialty bed Moisture Interventions: Apply protective barrier, creams and emollients, Maintain skin hydration (lotion/cream) Activity Interventions: Assess need for specialty bed, Increase time out of bed, Pressure redistribution bed/mattress(bed type) Mobility Interventions: Assess need for specialty bed, HOB 30 degrees or less, Pressure redistribution bed/mattress (bed type) Nutrition Interventions: Document food/fluid/supplement intake, Offer support with meals,snacks and hydration Friction and Shear Interventions: Foam dressings/transparent film/skin sealants Problem: Patient Education: Go to Patient Education Activity Goal: Patient/Family Education Outcome: Progressing Towards Goal 
  
Problem: Pain Goal: *Control of Pain Outcome: Progressing Towards Goal 
  
Problem: Patient Education: Go to Patient Education Activity Goal: Patient/Family Education Outcome: Progressing Towards Goal 
  
Problem: Hypertension Goal: *Blood pressure within specified parameters Outcome: Progressing Towards Goal 
Goal: *Fluid volume balance Outcome: Progressing Towards Goal 
Goal: *Labs within defined limits Outcome: Progressing Towards Goal 
  
Problem: Patient Education: Go to Patient Education Activity Goal: Patient/Family Education Outcome: Progressing Towards Goal 
  
Problem: High Risk or History of CONNOR Goal: Recognition of CONNOR or High Risk for CONNOR Outcome: Progressing Towards Goal 
Goal: Maintain Patent Airway and Adequate Oxygenation Outcome: Progressing Towards Goal 
Goal: Avoid Over-sedation Outcome: Progressing Towards Goal 
Goal: Maintenance Care of CNONOR Outcome: Progressing Towards Goal 
  
Problem: Patient Education: Go to Patient Education Activity Goal: Patient/Family Education Outcome: Progressing Towards Goal 
  
Problem: Breathing Pattern - Ineffective Goal: *Absence of hypoxia Outcome: Progressing Towards Goal 
Goal: *Use of effective breathing techniques Outcome: Progressing Towards Goal 
  
Problem: Patient Education: Go to Patient Education Activity Goal: Patient/Family Education Outcome: Progressing Towards Goal 
  
Problem: Patient Education: Go to Patient Education Activity Goal: Patient/Family Education Outcome: Progressing Towards Goal 
  
Problem: Heart Failure: Day 5 Goal: Off Pathway (Use only if patient is Off Pathway) Outcome: Progressing Towards Goal 
Goal: Activity/Safety Outcome: Progressing Towards Goal 
Goal: Diagnostic Test/Procedures Outcome: Progressing Towards Goal 
Goal: Nutrition/Diet Outcome: Progressing Towards Goal 
Goal: Discharge Planning Outcome: Progressing Towards Goal 
Goal: Medications Outcome: Progressing Towards Goal 
Goal: Respiratory Outcome: Progressing Towards Goal 
Goal: Treatments/Interventions/Procedures Outcome: Progressing Towards Goal 
Goal: Psychosocial 
Outcome: Progressing Towards Goal 
  
Problem: Heart Failure: Discharge Outcomes Goal: *Demonstrates ability to perform prescribed activity without shortness of breath or discomfort Outcome: Progressing Towards Goal 
Goal: *Left ventricular function assessment completed prior to or during stay, or planned for post-discharge Outcome: Progressing Towards Goal 
Goal: *ACEI prescribed if LVEF less than 40% and no contraindications or ARB prescribed Outcome: Progressing Towards Goal 
Goal: *Verbalizes understanding and describes prescribed diet Outcome: Progressing Towards Goal 
Goal: *Verbalizes understanding/describes prescribed medications Outcome: Progressing Towards Goal 
Goal: *Describes available resources and support systems Description 
(eg: Home Health, Palliative Care, Advanced Medical Directive) Outcome: Progressing Towards Goal 
Goal: *Describes smoking cessation resources Outcome: Progressing Towards Goal 
Goal: *Understands and describes signs and symptoms to report to providers(Stroke Metric) Outcome: Progressing Towards Goal 
Goal: *Describes/verbalizes understanding of follow-up/return appt Description 
(eg: to physicians, diabetes treatment coordinator, and other resources Outcome: Progressing Towards Goal 
Goal: *Describes importance of continuing daily weights and changes to report to physician Outcome: Progressing Towards Goal 
  
Problem: Patient Education: Go to Patient Education Activity Goal: Patient/Family Education Outcome: Progressing Towards Goal 
  
 Problem: Patient Education: Go to Patient Education Activity Goal: Patient/Family Education Description 1. Patient will complete lower body bathing and dressing with min A and adaptive equipment as needed. 2. Patient will complete toileting and toilet transfer with min A.  
3. Patient will tolerate 23 minutes of OT treatment with 1-2 rest breaks to increase activity tolerance for ADLs. 4. Patient will complete functional transfers with mod A and adaptive equipment as needed. Timeframe: 7 visits Outcome: Progressing Towards Goal 
  
Problem: Delirium Treatment Goal: *Level of consciousness restored to baseline Outcome: Progressing Towards Goal 
Goal: *Level of environmental perceptions restored to baseline Outcome: Progressing Towards Goal 
Goal: *Sensory perception restored to baseline Outcome: Progressing Towards Goal 
Goal: *Emotional stability restored to baseline Outcome: Progressing Towards Goal 
Goal: *Functional assessment restored to baseline Outcome: Progressing Towards Goal 
Goal: *Absence of falls Outcome: Progressing Towards Goal 
Goal: *Will remain free of delirium, CAM Score negative Outcome: Progressing Towards Goal 
Goal: *Cognitive status will be restored to baseline Outcome: Progressing Towards Goal 
Goal: Interventions Outcome: Progressing Towards Goal 
  
Problem: Patient Education: Go to Patient Education Activity Goal: Patient/Family Education Outcome: Progressing Towards Goal

## 2020-02-01 NOTE — PROGRESS NOTES
Date of Outreach Update: 
Chinedu Gonsales was seen and assessed. Previous Outreach assessment has been reviewed. There have been no significant clinical changes since the completion of the last dated Outreach assessment. Pt resting quietly in bed, in NAD. On 1L NC. No complaints at this time. Wife at bedside. Wife states she is \"not able to find his bottom dentures. \" Per wife, pt has had full set of dentures at the hospital since admission. Pt unable to eat well b/c he does not have these dentures. Primary RN aware. Also adamaris Hayes, charge RN aware. Will discharge from outreach program.  Please call with any concerns. Signed By:   Adan Byrd RN   February 1, 2020 2:52 PM

## 2020-02-01 NOTE — PROGRESS NOTES
Progress Note Patient: Pasha Zelaya MRN: [id number]  SSN: [id number] YOB: 1940  Age: 70 y.o. Sex: male Admit Date: 1/17/2020 LOS: 15 days Subjective: PMH of DM type 2, AF on Eliquis, obesity, HFpEF, fall Admitted due to a fall. Found to have right chest wall hematoma, anemia. Hypotension, coagulopathy. He received PRC transfusion. Surgery saw the patient and no intervention is needed. He developed pulmonary edema, requiring Lasix. Cardiology was consulted. Agreeable to stopping Eliquis. Echo with EF 20-25%, LVH. Patient has sepsis with UTI and is being treated for that. He is feeling better. He is feeling stronger. Objective:  
 
Vitals:  
 01/31/20 2341 02/01/20 8863 02/01/20 0149 02/01/20 1107 BP:  156/86 145/86 156/81 Pulse:  86 86 84 Resp:  20 20 20 Temp:  96.3 °F (35.7 °C) 98.6 °F (37 °C) 97.8 °F (36.6 °C) SpO2: 97% 91% 94% 94% Weight:      
Height:      
  
 
Intake and Output: 
Current Shift: No intake/output data recorded. Last three shifts: 01/30 1901 - 02/01 0700 In: 720 [P.O.:720] Out: 2500 [Urine:2500] Physical Exam:  
 
General:                    The patient is an elderly male in no acute distress. Obese. Head:                                   Normocephalic/atraumatic. Eyes:                                   palpebral pallor, no scleral icterus. ENT:                                    External auricular and nasal exam within normal limits. Mucous membranes are moist. 
Neck:                                   Supple, non-tender, no JVD. Right chest wall hematoma Lungs:                       decreased to auscultation bilaterally without wheezes or crackles. No respiratory distress or accessory muscle use. Heart:                                  Regular rate and rhythm, without murmurs, rubs, or gallops. Abdomen:                  Soft, non-tender, non-distended with normoactive bowel sounds. Genitourinary:           No tenderness over the bladder or bilateral CVAs. Extremities:               Without clubbing, cyanosis, or edema. Skin:                                    Normal color, texture, and turgor. No rashes, lesions, or jaundice. Pulses:                      Radial and dorsalis pedis pulses present 2+ bilaterally. Capillary refill <2s. Neurologic:                CN II-XII grossly intact and symmetrical.  
                                            Moving all four extremities well with no focal deficits. Psychiatric:                Pleasant demeanor, appropriate affect. Alert and oriented x 3 Lab/Data Review: 
 
Recent Results (from the past 24 hour(s)) GLUCOSE, POC Collection Time: 01/31/20  4:02 PM  
Result Value Ref Range Glucose (POC) 163 (H) 65 - 100 mg/dL GLUCOSE, POC Collection Time: 01/31/20  9:49 PM  
Result Value Ref Range Glucose (POC) 103 (H) 65 - 100 mg/dL METABOLIC PANEL, BASIC Collection Time: 02/01/20  4:01 AM  
Result Value Ref Range Sodium 137 136 - 145 mmol/L Potassium 4.7 3.5 - 5.1 mmol/L Chloride 104 98 - 107 mmol/L  
 CO2 23 21 - 32 mmol/L Anion gap 10 7 - 16 mmol/L Glucose 134 (H) 65 - 100 mg/dL BUN 56 (H) 8 - 23 MG/DL Creatinine 2.05 (H) 0.8 - 1.5 MG/DL  
 GFR est AA 41 (L) >60 ml/min/1.73m2 GFR est non-AA 34 (L) >60 ml/min/1.73m2 Calcium 8.8 8.3 - 10.4 MG/DL  
CBC WITH AUTOMATED DIFF Collection Time: 02/01/20  4:01 AM  
Result Value Ref Range WBC 15.2 (H) 4.3 - 11.1 K/uL  
 RBC 4.34 4.23 - 5.6 M/uL  
 HGB 10.2 (L) 13.6 - 17.2 g/dL HCT 34.2 (L) 41.1 - 50.3 % MCV 78.8 (L) 79.6 - 97.8 FL  
 MCH 23.5 (L) 26.1 - 32.9 PG  
 MCHC 29.8 (L) 31.4 - 35.0 g/dL RDW 23.8 (H) 11.9 - 14.6 % PLATELET 827 586 - 895 K/uL  MPV 10.7 9.4 - 12.3 FL  
 ABSOLUTE NRBC 0.05 0.0 - 0.2 K/uL  
 DF AUTOMATED NEUTROPHILS 84 (H) 43 - 78 % LYMPHOCYTES 6 (L) 13 - 44 % MONOCYTES 9 4.0 - 12.0 % EOSINOPHILS 0 (L) 0.5 - 7.8 % BASOPHILS 0 0.0 - 2.0 % IMMATURE GRANULOCYTES 1 0.0 - 5.0 %  
 ABS. NEUTROPHILS 12.8 (H) 1.7 - 8.2 K/UL  
 ABS. LYMPHOCYTES 0.9 0.5 - 4.6 K/UL  
 ABS. MONOCYTES 1.4 (H) 0.1 - 1.3 K/UL  
 ABS. EOSINOPHILS 0.0 0.0 - 0.8 K/UL  
 ABS. BASOPHILS 0.0 0.0 - 0.2 K/UL  
 ABS. IMM. GRANS. 0.1 0.0 - 0.5 K/UL GLUCOSE, POC Collection Time: 02/01/20  8:32 AM  
Result Value Ref Range Glucose (POC) 136 (H) 65 - 100 mg/dL GLUCOSE, POC Collection Time: 02/01/20 11:36 AM  
Result Value Ref Range Glucose (POC) 143 (H) 65 - 100 mg/dL CT head 
1- IMPRESSION:  Negative for acute intracranial abnormality. Chronic changes. XR chest  
1- IMPRESSION:  
1. New central line, without evidence of a pneumothorax. 2. Unchanged bibasilar lung edema or infiltrates. Duplex legs 1- IMPRESSION:  
1. No evidence of DVT in either leg. 2. Bilateral knee Baker's cysts. Results Procedure Component Value Units Date/Time CULTURE, URINE [[id number]]  (Abnormal)  (Susceptibility) Collected:  01/28/20 0401 Order Status:  Completed Specimen:  Clean catch Updated:  01/31/20 0605 Special Requests: NO SPECIAL REQUESTS Culture result:    
  >100,000 COLONIES/mL ESCHERICHIA COLI  
     
      
  10,000 to 50,000 COLONIES/mL MIXED SKIN JENNIFER ISOLATED Susceptibility Escherichia coli THOMAS Ampicillin ($) Resistant Ampicillin/sulbactam ($) Intermediate Aztreonam ($$$$) Susceptible Cefazolin ($) Susceptible Cefepime ($$) Susceptible Cefoxitin Susceptible Ceftriaxone ($) Susceptible Gentamicin ($) Susceptible Nitrofurantoin Susceptible Piperacillin/Tazobac ($) Susceptible Tobramycin ($) Susceptible Trimeth-Sulfamethoxa Resistant CULTURE, BLOOD [[id number]]  (Abnormal)  (Susceptibility) Collected:  01/28/20 0034 Order Status:  Completed Specimen:  Blood Updated:  01/30/20 1351 Special Requests: --     
  RIGHT 
HAND 
  
  GRAM STAIN GRAM NEGATIVE RODS ANAEROBIC BOTTLE POSITIVE RESULTS VERIFIED, PHONED TO AND READ BACK BY Rachelle Agee RN @4518 ON [id number] QF Culture result: ESCHERICHIA COLI     
   REFER TO BIOFIRE PANEL [id number] CULTURE IN PROGRESS,FURTHER UPDATES TO FOLLOW Susceptibility Escherichia coli THOMAS (Preliminary) Amikacin ($) Susceptible Ampicillin ($) Resistant Aztreonam ($$$$) Susceptible Cefazolin ($) Resistant Cefepime ($$) Susceptible Cefoxitin Susceptible Ceftriaxone ($) Susceptible Ciprofloxacin ($) Susceptible Gentamicin ($) Resistant Levofloxacin ($) Susceptible Meropenem ($$) Susceptible Piperacillin/Tazobac ($) Susceptible Tobramycin ($) Intermediate Trimeth-Sulfamethoxa Resistant CULTURE, BLOOD [[id number]] Collected:  01/28/20 0034 Order Status:  Completed Specimen:  Blood Updated:  02/01/20 8083 Special Requests: --     
  LEFT 
HAND Culture result: NO GROWTH 4 DAYS     
 BLOOD CULTURE ID PANEL [[id number]]  (Abnormal) Collected:  01/28/20 0034 Order Status:  Completed Specimen:  Blood Updated:  01/29/20 0754 Acc. no. from [employer] [id number] Escherichia coli DETECTED Comment: RESULTS VERIFIED, PHONED TO AND READ BACK BY 
MOJGAN TELLES RN AT 5227 1.29.20  
  
  
  KPC (Carbapenem Resistance Gene) NOT DETECTED Comment: WARNING:  A Not Detected result for the KPC gene does not indicate susceptibility to carbapenems. Gram negative bacteria can be resistant to carbapenems by mechanisms other than carrying the KPC gene. INTERPRETATION Gram negative cheryle. Identified by realtime PCR as E. coli Current Facility-Administered Medications:  
  traMADol (ULTRAM) tablet 50 mg, 50 mg, Oral, Q6H PRN, Fern Gonzales MD, 50 mg at 01/31/20 0617 
  magic mouthwash (CINDY) oral suspension 5 mL, 5 mL, Oral, Q4H, Fern Gonzales MD, 5 mL at 02/01/20 8928   cefTRIAXone (ROCEPHIN) 2 g in 0.9% sodium chloride (MBP/ADV) 50 mL, 2 g, IntraVENous, Q24H, Fern Gonzales MD, Last Rate: 100 mL/hr at 02/01/20 0848, 2 g at 02/01/20 0848   acetaminophen (TYLENOL) tablet 650 mg, 650 mg, Oral, Q6H PRN, Miko Sewell MD, 650 mg at 01/31/20 0800 
  insulin lispro (HUMALOG) injection 5 Units, 5 Units, SubCUTAneous, TIDAC, Miko Sewell MD, Stopped at 02/01/20 0730 
  HYDROcodone-acetaminophen (NORCO) 5-325 mg per tablet 1 Tab, 1 Tab, Oral, Q6H PRN, Miko Sewell MD, 1 Tab at 02/01/20 1125 
  sotalol (BETAPACE) tablet 80 mg, 80 mg, Oral, Q12H, Miko Sewell MD, 80 mg at 02/01/20 6513   nystatin (MYCOSTATIN) 100,000 unit/gram powder, , Topical, BID, Sidhom, Charles Scherer MD 
  0.9% sodium chloride infusion 250 mL, 250 mL, IntraVENous, PRN, Miko Sewell MD 
  albuterol-ipratropium (DUO-NEB) 2.5 MG-0.5 MG/3 ML, 3 mL, Nebulization, Q4H PRN, Miko Sewell MD, 3 mL at 01/20/20 2352   insulin lispro (HUMALOG) injection, , SubCUTAneous, AC&HS, Miko Sewell MD, Stopped at 01/31/20 2151   ondansetron (ZOFRAN) injection 4 mg, 4 mg, IntraVENous, Q6H PRN, Miko Sewell MD 
 
 
Assessment:  
 
Principal Problem: 
  Fall (1/17/2020) Active Problems: 
  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 Hematoma of right chest wall (1/17/2020) Coagulopathy ([address] from Eliquis adn Indocin use (1/17/2020) Acute pain of right knee (1/17/2020) Debility (1/17/2020) Fluid overload (1/17/2020) A-fib ([address] (1/17/2020) Hypotension (1/17/2020) Anticoagulated (1/17/2020) Chest wall hematoma (1/17/2020) Blood loss anemia (1/17/2020) Acute blood loss anemia (1/17/2020) Subcutaneous hematoma (1/17/2020) Chronic pulmonary edema (1/25/2020) Septic shock ([facility] [address] (1/29/2020) UTI (urinary tract infection) (1/29/2020) Gram-negative bacteremia (1/29/2020) MONTEZ (acute kidney injury) ([facility] [address] (1/30/2020) Plan:  
 
Bacteremia Due to UTI with septic shock. Shock is improved. Grew E.coli in urine. Continue current Ceftriaxone. MONTEZ Improving. Monitor renal function and intake and output. Avoid nephrotoxic agents. CHF exacerbation. Improved. Acute metabolic encephalopathy Improved. alert and oriented to place, time and person now. AF Off [address] due to hematoma and risk of falls. Rate is controlled. Diabetes mellitus type 2 Monitor blood sugar. Cover with insulin sliding scale accordingly. I have discussed the plan of care with patient. DVT prophylaxis : SCD Disposition plan : to be determined. Signed By: Veatrice Goodpasture, MD   
 February 1, 2020

## 2020-02-01 NOTE — PROGRESS NOTES
Date of Outreach Update: 
Jessica Rodriguez was seen and assessed. MEWS Score: 1 (01/31/20 2007) Vitals:  
 01/31/20 1505 01/31/20 2007 01/31/20 2324 01/31/20 2341 BP: 151/80 144/64 (!) 161/95 Pulse: 81 86 87 Resp: 19 18 22 Temp: 97.6 °F (36.4 °C) 97.5 °F (36.4 °C) 97.4 °F (36.3 °C) SpO2: 92% 96% 93% 97% Weight:      
Height:      
  
 
Previous Outreach assessment has been reviewed. There have been no significant clinical changes since the completion of the last dated Outreach assessment. Will continue to follow up per outreach protocol. Signed By:   Deanne Cam RN   February 1, 2020 2:16 AM

## 2020-02-02 NOTE — PROGRESS NOTES
END OF SHIFT NOTE: 
 
Intake/Output No intake/output data recorded. Voiding: NO 
Catheter: YES Drain:   
 
 
 
 
 
Stool:  0 occurrences. Stool Assessment Stool Color: (Have not observed) (01/28/20 0820) Stool Appearance: Formed (01/21/20 1650) Stool Amount: Medium (01/21/20 1650) Stool Source/Status: Rectum (01/21/20 1650) Emesis:  0 occurrences. VITAL SIGNS Patient Vitals for the past 12 hrs: 
 Temp Pulse Resp BP SpO2  
02/02/20 0313 98 °F (36.7 °C) 83 20 138/87 95 % 02/01/20 2249 98 °F (36.7 °C) 81 20 143/90 97 % 02/01/20 1928 97.9 °F (36.6 °C) 76 20 151/84 97 % Pain Assessment Pain 1 Pain Scale 1: Visual (02/02/20 8672) Pain Intensity 1: 0 (02/02/20 5789) Patient Stated Pain Goal: 0 (02/02/20 0335) Pain Reassessment 1: Patient resting w/respiratory rate greater than 10 (02/02/20 5872) Pain Onset 1: ongoing (01/30/20 1036) Pain Location 1: Hip (01/31/20 0801) Pain Orientation 1: Right (01/31/20 0801) Pain Description 1: Aching (01/31/20 0801) Pain Intervention(s) 1: Medication (see MAR) (02/01/20 1125) Ambulating No 
 
Additional Information:  
 
Shift report given to oncoming nurse at the bedside.  
 
Cj Singletary RN

## 2020-02-02 NOTE — PROGRESS NOTES
Progress Note Patient: Jessica Rodriguez MRN: [id number]  SSN: [id number] YOB: 1940  Age: 70 y.o. Sex: male Admit Date: 2020 LOS: 16 days Subjective: PMH of DM type 2, AF on Eliquis, obesity, HFpEF, fall Admitted due to a fall. Found to have right chest wall hematoma, anemia. Hypotension, coagulopathy. He received PRC transfusion. Surgery saw the patient and no intervention is needed. He developed pulmonary edema, requiring Lasix. Cardiology was consulted. Agreeable to stopping Eliquis. Echo with EF 20-25%, LVH. Patient has sepsis with UTI and is being treated for that. He is feeling better. He is still feeling weak. Mostly in bed. Objective:  
 
Vitals:  
 20 1928 20 2249 20 1854 20 7537 BP: 151/84 143/90 138/87 153/82 Pulse: 76 81 83 77 Resp:  Temp: 97.9 °F (36.6 °C) 98 °F (36.7 °C) 98 °F (36.7 °C) 97.9 °F (36.6 °C) SpO2: 97% 97% 95% 99% Weight:      
Height:      
  
 
Intake and Output: 
Current Shift: No intake/output data recorded. Last three shifts: 1901 -  0700 In: 120 [P.O.:120] [facility] 1[facility] Physical Exam:  
 
General:                    The patient is an elderly male in no acute distress. Obese. Appears weak today. No fever. No shortness of breath. Head:                                   Normocephalic/atraumatic. Eyes:                                   palpebral pallor, no scleral icterus. ENT:                                    External auricular and nasal exam within normal limits. Mucous membranes are moist. 
Neck:                                   Supple, non-tender, no JVD. Right chest wall hematoma Lungs:                       decreased to auscultation bilaterally without wheezes or crackles. No respiratory distress or accessory muscle use. Heart:                                  Regular rate and rhythm, without murmurs, rubs, or gallops. Abdomen:                  Soft, non-tender, non-distended with normoactive bowel sounds. Genitourinary:           No tenderness over the bladder or bilateral CVAs. Extremities:               Without clubbing, cyanosis, or edema. Skin:                                    Normal color, texture, and turgor. No rashes, lesions, or jaundice. Pulses:                      Radial and dorsalis pedis pulses present 2+ bilaterally. Capillary refill <2s. Neurologic:                CN II-XII grossly intact and symmetrical.  
                                            Moving all four extremities well with no focal deficits. Psychiatric:                Pleasant demeanor, appropriate affect. Alert and oriented x 3 Lab/Data Review: 
 
Recent Results (from the past 24 hour(s)) GLUCOSE, POC Collection Time: 02/01/20 11:36 AM  
Result Value Ref Range Glucose (POC) 143 (H) 65 - 100 mg/dL GLUCOSE, POC Collection Time: 02/01/20  3:54 PM  
Result Value Ref Range Glucose (POC) 122 (H) 65 - 100 mg/dL GLUCOSE, POC Collection Time: 02/01/20  9:12 PM  
Result Value Ref Range Glucose (POC) 146 (H) 65 - 100 mg/dL METABOLIC PANEL, BASIC Collection Time: 02/02/20  3:55 AM  
Result Value Ref Range Sodium 140 136 - 145 mmol/L Potassium 5.4 (H) 3.5 - 5.1 mmol/L Chloride 106 98 - 107 mmol/L  
 CO2 21 21 - 32 mmol/L Anion gap 13 7 - 16 mmol/L Glucose 149 (H) 65 - 100 mg/dL BUN 56 (H) 8 - 23 MG/DL Creatinine 1.72 (H) 0.8 - 1.5 MG/DL  
 GFR est AA 50 (L) >60 ml/min/1.73m2 GFR est non-AA 41 (L) >60 ml/min/1.73m2 Calcium 9.1 8.3 - 10.4 MG/DL  
CBC WITH AUTOMATED DIFF Collection Time: 02/02/20  3:55 AM  
Result Value Ref Range WBC 16.2 (H) 4.3 - 11.1 K/uL  
 RBC 4.59 4.23 - 5.6 M/uL  
 HGB 10.9 (L) 13.6 - 17.2 g/dL HCT 37.4 (L) 41.1 - 50.3 % MCV 81.5 79.6 - 97.8 FL  
 MCH 23.7 (L) 26.1 - 32.9 PG  
 MCHC 29.1 (L) 31.4 - 35.0 g/dL RDW 24.6 (H) 11.9 - 14.6 % PLATELET 158 997 - 302 K/uL MPV 11.1 9.4 - 12.3 FL ABSOLUTE NRBC 0.06 0.0 - 0.2 K/uL  
 DF AUTOMATED NEUTROPHILS 83 (H) 43 - 78 % LYMPHOCYTES 7 (L) 13 - 44 % MONOCYTES 8 4.0 - 12.0 % EOSINOPHILS 0 (L) 0.5 - 7.8 % BASOPHILS 0 0.0 - 2.0 % IMMATURE GRANULOCYTES 1 0.0 - 5.0 %  
 ABS. NEUTROPHILS 13.5 (H) 1.7 - 8.2 K/UL  
 ABS. LYMPHOCYTES 1.1 0.5 - 4.6 K/UL  
 ABS. MONOCYTES 1.4 (H) 0.1 - 1.3 K/UL  
 ABS. EOSINOPHILS 0.0 0.0 - 0.8 K/UL  
 ABS. BASOPHILS 0.0 0.0 - 0.2 K/UL  
 ABS. IMM. GRANS. 0.2 0.0 - 0.5 K/UL GLUCOSE, POC Collection Time: 02/02/20  7:37 AM  
Result Value Ref Range Glucose (POC) 142 (H) 65 - 100 mg/dL CT head 
1- IMPRESSION:  Negative for acute intracranial abnormality. Chronic changes. XR chest  
1- IMPRESSION:  
1. New central line, without evidence of a pneumothorax. 2. Unchanged bibasilar lung edema or infiltrates. Duplex legs 1- IMPRESSION:  
1. No evidence of DVT in either leg. 2. Bilateral knee Baker's cysts. Results Procedure Component Value Units Date/Time CULTURE, URINE [[id number]]  (Abnormal)  (Susceptibility) Collected:  01/28/20 0401 Order Status:  Completed Specimen:  Clean catch Updated:  01/31/20 6166 Special Requests: NO SPECIAL REQUESTS Culture result:    
  >100,000 COLONIES/mL ESCHERICHIA COLI  
     
      
  10,000 to 50,000 COLONIES/mL MIXED SKIN JENNIFER ISOLATED Susceptibility Escherichia coli THOMAS Ampicillin ($) Resistant Ampicillin/sulbactam ($) Intermediate Aztreonam ($$$$) Susceptible Cefazolin ($) Susceptible Cefepime ($$) Susceptible Cefoxitin Susceptible Ceftriaxone ($) Susceptible Gentamicin ($) Susceptible Nitrofurantoin Susceptible Piperacillin/Tazobac ($) Susceptible Tobramycin ($) Susceptible Trimeth-Sulfamethoxa Resistant CULTURE, BLOOD [[id number]]  (Abnormal)  (Susceptibility) Collected:  01/28/20 0034 Order Status:  Completed Specimen:  Blood Updated:  01/30/20 1351 Special Requests: --     
  RIGHT 
HAND 
  
  GRAM STAIN GRAM NEGATIVE RODS ANAEROBIC BOTTLE POSITIVE RESULTS VERIFIED, PHONED TO AND READ BACK BY Rachelle Agee RN @[phone] ON [id number]  Culture result: ESCHERICHIA COLI     
   REFER TO BIOFIRE PANEL [id number] CULTURE IN PROGRESS,FURTHER UPDATES TO FOLLOW Susceptibility Escherichia coli THOMAS (Preliminary) Amikacin ($) Susceptible Ampicillin ($) Resistant Aztreonam ($$$$) Susceptible Cefazolin ($) Resistant Cefepime ($$) Susceptible Cefoxitin Susceptible Ceftriaxone ($) Susceptible Ciprofloxacin ($) Susceptible Gentamicin ($) Resistant Levofloxacin ($) Susceptible Meropenem ($$) Susceptible Piperacillin/Tazobac ($) Susceptible Tobramycin ($) Intermediate Trimeth-Sulfamethoxa Resistant CULTURE, BLOOD [[id number]] Collected:  01/28/20 0034 Order Status:  Completed Specimen:  Blood Updated:  02/02/20 3194 Special Requests: --     
  LEFT 
HAND Culture result: NO GROWTH 5 DAYS     
 BLOOD CULTURE ID PANEL [[id number]]  (Abnormal) Collected:  01/28/20 0034 Order Status:  Completed Specimen:  Blood Updated:  01/29/20 0754 Acc. no. from [employer] [id number] Escherichia coli DETECTED Comment: RESULTS VERIFIED, PHONED TO AND READ BACK BY 
MOJGAN TELLES RN AT 0105 6.62.20  
  
  
  KPC (Carbapenem Resistance Gene) NOT DETECTED Comment: WARNING:  A Not Detected result for the KPC gene does not indicate susceptibility to carbapenems. Gram negative bacteria can be resistant to carbapenems by mechanisms other than carrying the KPC gene.   
  
  INTERPRETATION    
 Gram negative cheryle. Identified by realtime PCR as E. coli Current Facility-Administered Medications:  
  traMADol (ULTRAM) tablet 50 mg, 50 mg, Oral, Q6H PRN, Della Vee MD, 50 mg at 01/31/20 0617 
  magic mouthwash (CINDY) oral suspension 5 mL, 5 mL, Oral, Q4H, Della Vee MD, Stopped at 02/02/20 0000   cefTRIAXone (ROCEPHIN) 2 g in 0.9% sodium chloride (MBP/ADV) 50 mL, 2 g, IntraVENous, Q24H, Della Vee MD, Last Rate: 100 mL/hr at 02/02/20 0809, 2 g at 02/02/20 9756   acetaminophen (TYLENOL) tablet 650 mg, 650 mg, Oral, Q6H PRN, Angie Sheridan MD, 650 mg at 01/31/20 0800 
  insulin lispro (HUMALOG) injection 5 Units, 5 Units, SubCUTAneous, TIDAC, Angie Sheridan MD, 5 Units at 02/02/20 0809 
  HYDROcodone-acetaminophen (NORCO) 5-325 mg per tablet 1 Tab, 1 Tab, Oral, Q6H PRN, Angie Sheridan MD, 1 Tab at 02/01/20 1125 
  sotalol (BETAPACE) tablet 80 mg, 80 mg, Oral, Q12H, Angie Sheridan MD, 80 mg at 02/01/20 2020   nystatin (MYCOSTATIN) 100,000 unit/gram powder, , Topical, BID, Sidhom, Yony Pimentel MD 
  0.9% sodium chloride infusion 250 mL, 250 mL, IntraVENous, PRN, Angie Sheridan MD 
  albuterol-ipratropium (DUO-NEB) 2.5 MG-0.5 MG/3 ML, 3 mL, Nebulization, Q4H PRN, Angie Sheridan MD, 3 mL at 01/20/20 2352   insulin lispro (HUMALOG) injection, , SubCUTAneous, AC&HS, Angie Sheridan MD, Stopped at 01/31/20 2151   ondansetron (ZOFRAN) injection 4 mg, 4 mg, IntraVENous, Q6H PRN, Angie Sheridan MD 
 
 
Assessment:  
 
Principal Problem: 
  Fall (1/17/2020) Active Problems: 
  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 Hematoma of right chest wall (1/17/2020) Coagulopathy ([address] from Eliquis adn Indocin use (1/17/2020) Acute pain of right knee (1/17/2020) Debility (1/17/2020) Fluid overload (1/17/2020) A-fib ([address] (1/17/2020) Hypotension (1/17/2020) Anticoagulated (1/17/2020) Chest wall hematoma (1/17/2020) Blood loss anemia (1/17/2020) Acute blood loss anemia (1/17/2020) Subcutaneous hematoma (1/17/2020) Chronic pulmonary edema (1/25/2020) Septic shock ([facility] (1/29/2020) UTI (urinary tract infection) (1/29/2020) Gram-negative bacteremia (1/29/2020) MONTEZ (acute kidney injury) ([facility] (1/30/2020) Plan:  
 
Bacteremia Due to UTI with septic shock. Shock is improved. Grew E.coli in urine. Continue current Ceftriaxone. MONTEZ Improving. Monitor renal function and intake and output. Avoid nephrotoxic agents. CHF exacerbation. Improved. Acute metabolic encephalopathy Improved. alert and oriented to place, time and person now. AF Off [address] due to hematoma and risk of falls. Rate is controlled. Diabetes mellitus type 2 Monitor blood sugar. Cover with insulin sliding scale accordingly. I have discussed the plan of care with patient. DVT prophylaxis : SCD Disposition plan : to be determined. Physical therapy to help with ambulation and strength. Signed By: Ranjith Tran MD   
 February 2, 2020

## 2020-02-03 PROBLEM — E87.5 HYPERKALEMIA: Status: ACTIVE | Noted: 2020-01-01

## 2020-02-03 NOTE — PROGRESS NOTES
Pharmacokinetic Consult to Pharmacist 
 
Gwendolyn Chidi is a 70 y.o. male being treated for HAP with Vancomycin and Cefepime. Height: 6' (182.9 cm)  Weight: 154.8 kg (341 lb 4.4 oz) Lab Results Component Value Date/Time BUN 50 (H) 02/03/2020 03:40 AM  
 Creatinine 1.44 02/03/2020 03:40 AM  
 WBC 25.3 (H) 02/03/2020 03:40 AM  
 Procalcitonin <0.05 12/21/2019 10:05 PM  
 Lactic acid 1.8 01/29/2020 04:24 AM  
 Lactic Acid (POC) 0.52 12/21/2019 10:54 PM  
  
Estimated Creatinine Clearance: 65.9 mL/min (based on SCr of 1.44 mg/dL). Lab Results Component Value Date/Time Creatinine 1.44 02/03/2020 03:40 AM  
 Creatinine 1.72 (H) 02/02/2020 03:55 AM  
 Creatinine 2.05 (H) 02/01/2020 04:01 AM  
 Creatinine 2.48 (H) 01/31/2020 04:21 AM  
 Creatinine 3.21 (H) 01/30/2020 03:48 AM  
 
CULTURES: 
Pending No results found for: Chryl Southward Day 1 of vancomycin. Goal trough is 15-20. Will start Vancomycin 2500mg IV x1 dose followed by Vancomycin 2000mg IV q18h. Will monitor renal function closely. Vancomycin level to be drawn before 3rd dose. Will continue to follow patient. Thank you, SENDY Bailey, PharmD

## 2020-02-03 NOTE — PROGRESS NOTES
Problem: Mobility Impaired (Adult and Pediatric) Goal: *Acute Goals and Plan of Care (Insert Text) Description Goals: (assessed and revised 1/30/20): 
(1.)Mr. Leandra Chang will move from supine to sit and sit to supine , scoot up and down and roll side to side with MINIMAL ASSIST within 7 treatment day(s). (2.)Mr. Leandra Chang will transfer from bed to chair and chair to bed with MODERATE ASSIST x 1 using the least restrictive device within 7 treatment day(s). (3.)Mr. Leandra Chang will ambulate with MODERATE ASSIST for 25+ feet with the least restrictive device within 7 treatment day(s). (4.)Mr. Leandra Chang will perform sit to stand transfer from EOB with MIN A x 2 within 7 treatment days. (5.)Mr. Leandra Chang will perform static and dynamic sitting balance at EOB x 8 min within 7 treatment days for increased trunk control. PHYSICAL THERAPY: Daily Note and AM 2/3/2020 INPATIENT: PT Visit Days : 2 [place] / [place] / Product Type: Medicare /   
  
NAME/AGE/GENDER: Johana Nicholson is a 70 y.o. male PRIMARY DIAGNOSIS: Chest wall hematoma [S20.219A] Hypotension [I95.9] A-fib ([facility] [I48.91] Anticoagulated [Z79.01] Acute blood loss anemia [D62] Subcutaneous hematoma [T14. Se Sammie Fall Fall ICD-10: Treatment Diagnosis:  
 · Generalized Muscle Weakness (M62.81) · Difficulty in walking, Not elsewhere classified (R26.2) · Repeated Falls (R29.6) Precaution/Allergies: 
Clindamycin; Demerol [meperidine]; Losartan-hydrochlorothiazide; and Pcn [penicillins] ASSESSMENT:  
 
Mr. Leandra Chang is lethargic this date, eyes open to voice but remains very drowsy throughout treatment session. Pt on 2 L/min O2, hooker catheter. Pt without verbal response throughout session, nods head yes/no to some questions, decreased attention noted. Pt max to total A x 2 for bed mobility and supine to sit.  Pt unable to maintain static sitting balance at EOB this date, attempted to work on trunk control, righting reactions, posture in sitting. Co-treatment with VELIZ at EOB with pt working on self care/ADLs with VELIZ and B UEs; PT working on trunk control, posture, balance. Pt with very limited to poor response this date. Pt sat EOB x 20+ minutes with total to max A, some righting response noted but unable to self correct. Pt tending to lean to R side. RN made aware of pt alertness and mobility. Pt with little to no progress made from last visit on Thursday, if anything appears to have declined with mobility. PT to cont to follow for acute care needs. Pt will need rehab at discharge. per initial: a 70year old WM with an admitting diagnosis of Chest wall hematoma secondary to a fall at home with his rollator. His PMH includes DM2, A-fib, pacemaker, CHF, obesity and falls. He reports he lives with his wife in a 1 level home with 1 step to enter. His PLOF has been with use of his rollator and he reports he has a power scooter that he uses in his home and in the community. He reports several falls at home. He reports he only walks short distances at home from one room to another. He cannot mange his size and weight at this time and remains limited with mobility and endurance. Safety is a primary concern with his mobility. This section established at most recent assessment PROBLEM LIST (Impairments causing functional limitations): 1. Decreased Strength 2. Decreased ADL/Functional Activities 3. Decreased Transfer Abilities 4. Decreased Ambulation Ability/Technique 5. Decreased Balance 6. Decreased Activity Tolerance 7. Decreased Flexibility/Joint Mobility 8. Edema/Girth INTERVENTIONS PLANNED: (Benefits and precautions of physical therapy have been discussed with the patient.) 1. Balance Exercise 2. Bed Mobility 3. Family Education 4. Gait Training 5. Home Exercise Program (HEP) 6. Neuromuscular Re-education/Strengthening 7. Range of Motion (ROM) 8. Therapeutic Activites 9. Therapeutic Exercise/Strengthening 10. Transfer Training TREATMENT PLAN: Frequency/Duration: 3 times a week for duration of hospital stay Rehabilitation Potential For Stated Goals: Fair REHAB RECOMMENDATIONS (at time of discharge pending progress):   
Placement: It is my opinion, based on this patient's performance to date, that Mr. Kiki Blackwell may benefit from intensive therapy at a [address] [place] [address] after discharge due to the functional deficits listed above that are likely to improve with skilled rehabilitation and concerns that he/she may be unsafe to be unsupervised at home due to weakness and history of falls. Equipment:  
? None at this time HISTORY:  
History of Present Injury/Illness (Reason for Referral): Aden Estes is a 70 y.o. male who on Eliquis for afib who came to the ER on 1/17/20 after a fall 1-2 hrs prior to arrival. 
He also takes Indocin He had swelling and hematoma of the right upper anterior chest 
He fell from a standing position while using a Rollator and landed on the device and hit is right anterior chest.  
He developed progressive severe swelling and hematoma He is on Eliquis for atrial fibrillation.  Over the past 2 hours since the fall the hematoma has expanded to a small melon. Very painful and firm. No SOB. Denies any hip or back pain. Also has associated right knee pain No LOC or head trauma Past Medical History/Comorbidities:  
Mr. Kiki Blackwell  has a past medical history of Arrhythmia, Arthritis, CAD (coronary artery disease), Cardiac pacemaker (11/12/2015), Chronic diastolic heart failure ([address] (11/12/2015), Chronic pain, Diabetes ([address] Diabetes mellitus type II, uncontrolled ([address] Dyspnea/shortness of breath (11/12/2015), GERD (gastroesophageal reflux disease), Heart failure ([address] HTN - uncontrolled, malignant (8/8/2016), Hyperlipidemia (11/12/2015), Hypertension, Malaise/fatigue/weakness/tiredness (11/12/2015), Mixed hyperlipidemia, Morbid obesity ([facility] Orthostatic hypotension (11/12/2015), Other ill-defined conditions(799.89), Paroxysmal atrial fibrillation ([facility] Permanent atrial fibrillation ([facility] (11/12/2015), Tendon injury, and Unspecified sleep apnea. Mr. Anish Vila  has a past surgical history that includes hx other surgical; hx hernia repair; pr left heart cath,percutaneous (3/31/2014); and hx pacemaker. Social History/Living Environment:  
Home Environment: Private residence # Steps to Enter: 1 One/Two Story Residence: One story Living Alone: No 
Support Systems: Spouse/Significant Other/Partner Patient Expects to be Discharged to[de-identified] Unknown Current DME Used/Available at Home: Rheta Hark, rollator, Commode, bedside Prior Level of Function/Work/Activity: 
Patient reports he has been ambulating with a rollator at home and uses his power scooter but her also reports several falls. Number of Personal Factors/Comorbidities that affect the Plan of Care: 3+: HIGH COMPLEXITY EXAMINATION:  
Most Recent Physical Functioning:  
Gross Assessment: 
AROM: Generally decreased, functional(B LE) Strength: Generally decreased, functional(B LE) Coordination: Generally decreased, functional 
Sensation: Intact(B LE to light touch) Posture: 
Posture (WDL): Exceptions to [facility] Posture Assessment: Cervical, Forward head, Rounded shoulders Balance: 
Sitting: Impaired Sitting - Static: Poor (constant support)(leaning R) Sitting - Dynamic: Poor (constant support) Bed Mobility: 
Rolling: Maximum assistance; Total assistance;Assist x2 Supine to Sit: Maximum assistance; Total assistance;Assist x2 Sit to Supine: Maximum assistance; Total assistance;Assist x2 Scooting: Total assistance;Assist x2 Wheelchair Mobility: 
  
Transfers: 
  
Gait: 
  
   
  
Body Structures Involved: 1. Muscles Body Functions Affected: 1.  Neuromusculoskeletal 
 2. Movement Related Activities and Participation Affected: 1. General Tasks and Demands 2. Mobility 3. Self Care Number of elements that affect the Plan of Care: 3: MODERATE COMPLEXITY CLINICAL PRESENTATION:  
Presentation: Evolving clinical presentation with changing clinical characteristics: MODERATE COMPLEXITY CLINICAL DECISION MAKIN [facility] [id number] AM-PAC 6 Clicks Basic Mobility Inpatient Short Form How much difficulty does the patient currently have. .. Unable A Lot A Little None 1. Turning over in bed (including adjusting bedclothes, sheets and blankets)? [] 1   [x] 2   [] 3   [] 4  
2. Sitting down on and standing up from a chair with arms ( e.g., wheelchair, bedside commode, etc.)   [x] 1   [] 2   [] 3   [] 4  
3. Moving from lying on back to sitting on the side of the bed? [x] 1   [] 2   [] 3   [] 4 How much help from another person does the patient currently need. .. Total A Lot A Little None 4. Moving to and from a bed to a chair (including a wheelchair)? [x] 1   [] 2   [] 3   [] 4  
5. Need to walk in hospital room? [x] 1   [] 2   [] 3   [] 4  
6. Climbing 3-5 steps with a railing? [x] 1   [] 2   [] 3   [] 4  
© , Trustees of [address] [place] [address] [address] [address], under license to [employer]. All rights reserved Score:  Initial: 11 Most Recent: 7 (Date: -20- ) Interpretation of Tool:  Represents activities that are increasingly more difficult (i.e. Bed mobility, Transfers, Gait). Medical Necessity:    
· Patient is expected to demonstrate progress in strength and functional technique to decrease assistance required with bed mobility, SPT and short distance gait with rollator. Reason for Services/Other Comments: 
· Patient continues to require skilled intervention due to medical complications.   
Use of outcome tool(s) and clinical judgement create a POC that gives a: Questionable prediction of patient's progress: MODERATE COMPLEXITY  
  
 
 
 
TREATMENT:  
 (In addition to Assessment/Re-Assessment sessions the following treatments were rendered) Pre-treatment Symptoms/Complaints:  No verbal response, very drowsy/lethargic Pain: Initial: shakes head no Pain Intensity 1: 0  Post Session:  Back to sleep once supine Neuromuscular Re-education: ( 23 min): Activities in sitting to improve balance, coordination, kinesthetic sense and posture. Required maximal verbal and tactile cues to promote static balance in sitting and promote coordination of bilateral, upper extremity(s), lower extremity(s). Therapeutic Exercise: ( 0 mins):  Exercises per grid below to improve mobility and strength. Required maximal visual and verbal cues to promote proper body alignment and promote proper body mechanics. Weak in the legs with exercise and mobility. Date: 
1/22/20 Date: 
 Date: Activity/Exercise Parameters Parameters Parameters Ankle pumps 2 x 10 Heel slides in supine 10 B Hip IR/ER in supine 10 B Seated knee extension 2 x 10 Braces/Orthotics/Lines/Etc:  
· IV 
· hooker catheter · O2 Device: Hi flow nasal cannula Treatment/Session Assessment:   
· Response to Treatment:  Pt is very weak, drowsy, limited response · Interdisciplinary Collaboration:  
o Physical Therapist 
o Certified Occupational Therapy Assistant 
o Registered Nurse · After treatment position/precautions:  
o Supine in bed 
o Bed/Chair-wheels locked 
o Bed in low position 
o Call light within reach 
o RN notified · Compliance with Program/Exercises: Will assess as treatment progresses · Recommendations/Intent for next treatment session: \"Next visit will focus on advancements to more challenging activities and reduction in assistance provided\". Total Treatment Duration: PT Patient Time In/Time Out Time In: 6592 Time Out: 1124 Faustina Velasquez PT

## 2020-02-03 NOTE — PROGRESS NOTES
Progress Note Patient: Josseline Ríos MRN: [id number]  SSN: [id number] YOB: 1940  Age: 70 y.o. Sex: male Admit Date: 1/17/2020 LOS: 17 days Subjective: PMH of DM type 2, AF on Eliquis, obesity, HFpEF, fall Admitted due to a fall. Found to have right chest wall hematoma, anemia. Hypotension, coagulopathy. He received PRC transfusion. Surgery saw the patient and no intervention is needed. He developed pulmonary edema, requiring Lasix. Cardiology was consulted. Agreeable to stopping Eliquis. Echo with EF 20-25%, LVH. Patient has sepsis with UTI and is being treated for that. Sepsis is improved. He is still mostly in bed. Feeling weak. Objective:  
 
Vitals:  
 02/02/20 1902 02/02/20 2305 02/03/20 0301 02/03/20 0720 BP: 155/76 168/83 167/86 150/79 Pulse: 80 76 78 87 Resp: 18 18 16 18 Temp: 98.3 °F (36.8 °C) 98.3 °F (36.8 °C) 98.2 °F (36.8 °C) 96.1 °F (35.6 °C) SpO2: 98% 98% 98% 97% Weight:      
Height:      
  
 
Intake and Output: 
Current Shift: No intake/output data recorded. Last three shifts: 02/01 1901 - 02/03 0700 In: 48 [P.O.:50] Out: 875 Valentino Beers Physical Exam:  
 
General:                    The patient is an elderly male in no acute distress. Obese. Appears weak. Head:                                   Normocephalic/atraumatic. Eyes:                                   palpebral pallor, no scleral icterus. ENT:                                    External auricular and nasal exam within normal limits. Mucous membranes are moist. 
Neck:                                   Supple, non-tender, no JVD. Right chest wall hematoma Lungs:                       decreased to auscultation bilaterally without wheezes or crackles. No respiratory distress or accessory muscle use. Heart:                                  Regular rate and rhythm, without murmurs, rubs, or gallops. Abdomen:                  Soft, non-tender, non-distended with normoactive bowel sounds. Genitourinary:           No tenderness over the bladder or bilateral CVAs. Extremities:               Without clubbing, cyanosis, or edema. Skin:                                    Normal color, texture, and turgor. No rashes, lesions, or jaundice. Pulses:                      Radial and dorsalis pedis pulses present 2+ bilaterally. Capillary refill <2s. Neurologic:                CN II-XII grossly intact and symmetrical.  
                                            Moving all four extremities well with no focal deficits. Psychiatric:                Pleasant demeanor, appropriate affect. Alert and oriented x 3 Lab/Data Review: 
 
Recent Results (from the past 24 hour(s)) GLUCOSE, POC Collection Time: 02/02/20 11:01 AM  
Result Value Ref Range Glucose (POC) 116 (H) 65 - 100 mg/dL GLUCOSE, POC Collection Time: 02/02/20  4:35 PM  
Result Value Ref Range Glucose (POC) 118 (H) 65 - 100 mg/dL GLUCOSE, POC Collection Time: 02/02/20  9:01 PM  
Result Value Ref Range Glucose (POC) 116 (H) 65 - 100 mg/dL CBC WITH AUTOMATED DIFF Collection Time: 02/03/20  3:40 AM  
Result Value Ref Range WBC 25.3 (H) 4.3 - 11.1 K/uL  
 RBC 4.71 4.23 - 5.6 M/uL  
 HGB 11.1 (L) 13.6 - 17.2 g/dL HCT 38.1 (L) 41.1 - 50.3 % MCV 80.9 79.6 - 97.8 FL  
 MCH 23.6 (L) 26.1 - 32.9 PG  
 MCHC 29.1 (L) 31.4 - 35.0 g/dL RDW 24.8 (H) 11.9 - 14.6 % PLATELET 071 659 - 741 K/uL MPV 10.9 9.4 - 12.3 FL ABSOLUTE NRBC 0.04 0.0 - 0.2 K/uL  
 DF AUTOMATED NEUTROPHILS 79 (H) 43 - 78 % LYMPHOCYTES 6 (L) 13 - 44 % MONOCYTES 14 (H) 4.0 - 12.0 % EOSINOPHILS 0 (L) 0.5 - 7.8 % BASOPHILS 0 0.0 - 2.0 % IMMATURE GRANULOCYTES 1 0.0 - 5.0 % ABS. NEUTROPHILS 19.9 (H) 1.7 - 8.2 K/UL  
 ABS. LYMPHOCYTES 1.6 0.5 - 4.6 K/UL  
 ABS. MONOCYTES 3.5 (H) 0.1 - 1.3 K/UL  
 ABS. EOSINOPHILS 0.0 0.0 - 0.8 K/UL  
 ABS. BASOPHILS 0.1 0.0 - 0.2 K/UL  
 ABS. IMM. GRANS. 0.3 0.0 - 0.5 K/UL METABOLIC PANEL, BASIC Collection Time: 02/03/20  3:40 AM  
Result Value Ref Range Sodium 144 136 - 145 mmol/L Potassium 5.5 (H) 3.5 - 5.1 mmol/L Chloride 110 (H) 98 - 107 mmol/L  
 CO2 21 21 - 32 mmol/L Anion gap 13 7 - 16 mmol/L Glucose 132 (H) 65 - 100 mg/dL BUN 50 (H) 8 - 23 MG/DL Creatinine 1.44 0.8 - 1.5 MG/DL  
 GFR est AA >60 >60 ml/min/1.73m2 GFR est non-AA 51 (L) >60 ml/min/1.73m2 Calcium 9.0 8.3 - 10.4 MG/DL  
GLUCOSE, POC Collection Time: 02/03/20  7:18 AM  
Result Value Ref Range Glucose (POC) 141 (H) 65 - 100 mg/dL CT head 
1- IMPRESSION:  Negative for acute intracranial abnormality. Chronic changes. XR chest  
1- IMPRESSION:  
1. New central line, without evidence of a pneumothorax. 2. Unchanged bibasilar lung edema or infiltrates. Duplex legs 1- IMPRESSION:  
1. No evidence of DVT in either leg. 2. Bilateral knee Baker's cysts. Results Procedure Component Value Units Date/Time CULTURE, URINE [[id number]]  (Abnormal)  (Susceptibility) Collected:  01/28/20 0401 Order Status:  Completed Specimen:  Clean catch Updated:  01/31/20 8116 Special Requests: NO SPECIAL REQUESTS Culture result:    
  >100,000 COLONIES/mL ESCHERICHIA COLI  
     
      
  10,000 to 50,000 COLONIES/mL MIXED SKIN JENNIFER ISOLATED Susceptibility Escherichia coli THOMAS Ampicillin ($) Resistant Ampicillin/sulbactam ($) Intermediate Aztreonam ($$$$) Susceptible Cefazolin ($) Susceptible Cefepime ($$) Susceptible Cefoxitin Susceptible Ceftriaxone ($) Susceptible Gentamicin ($) Susceptible Nitrofurantoin Susceptible Piperacillin/Tazobac ($) Susceptible Tobramycin ($) Susceptible Trimeth-Sulfamethoxa Resistant CULTURE, BLOOD [[id number]]  (Abnormal)  (Susceptibility) Collected:  01/28/20 0034 Order Status:  Completed Specimen:  Blood Updated:  02/03/20 0756 Special Requests: --     
  RIGHT 
HAND 
  
  GRAM STAIN GRAM NEGATIVE RODS ANAEROBIC BOTTLE POSITIVE RESULTS VERIFIED, PHONED TO AND READ BACK BY Rachelle Agee RN @2832 ON [id number] CONN Culture result: ESCHERICHIA COLI     
   REFER TO BIOFIRE PANEL [id number] Susceptibility Escherichia coli THOMAS Amikacin ($) Susceptible Ampicillin ($) Resistant Aztreonam ($$$$) Susceptible Cefazolin ($) Resistant Cefepime ($$) Susceptible Cefoxitin Susceptible Ceftriaxone ($) Susceptible Ciprofloxacin ($) Susceptible Gentamicin ($) Resistant Levofloxacin ($) Susceptible Meropenem ($$) Susceptible Piperacillin/Tazobac ($) Susceptible Tobramycin ($) Intermediate Trimeth-Sulfamethoxa Resistant CULTURE, BLOOD [[id number]] Collected:  01/28/20 0034 Order Status:  Completed Specimen:  Blood Updated:  02/02/20 0244 Special Requests: --     
  LEFT 
HAND Culture result: NO GROWTH 5 DAYS     
 BLOOD CULTURE ID PANEL [[id number]]  (Abnormal) Collected:  01/28/20 0034 Order Status:  Completed Specimen:  Blood Updated:  01/29/20 0754 Acc. no. from [facility] [id number] Escherichia coli DETECTED Comment: RESULTS VERIFIED, PHONED TO AND READ BACK BY 
MOJGAN TELLES RN AT 5791 1.29.20  
  
  
  KPC (Carbapenem Resistance Gene) NOT DETECTED Comment: WARNING:  A Not Detected result for the KPC gene does not indicate susceptibility to carbapenems. Gram negative bacteria can be resistant to carbapenems by mechanisms other than carrying the KPC gene. INTERPRETATION Gram negative cheryle. Identified by realtime PCR as E. coli Current Facility-Administered Medications:  
  traMADol (ULTRAM) tablet 50 mg, 50 mg, Oral, Q6H PRN, Yudith Wallis MD, 50 mg at 01/31/20 0617 
  magic mouthwash (CINDY) oral suspension 5 mL, 5 mL, Oral, Q4H, Yudith Wallis MD, 5 mL at 02/03/20 0400   cefTRIAXone (ROCEPHIN) 2 g in 0.9% sodium chloride (MBP/ADV) 50 mL, 2 g, IntraVENous, Q24H, Yudith Wallis MD, Last Rate: 100 mL/hr at 02/03/20 0814, 2 g at 02/03/20 9363   acetaminophen (TYLENOL) tablet 650 mg, 650 mg, Oral, Q6H PRN, Dana Rowland MD, 650 mg at 01/31/20 0800 
  HYDROcodone-acetaminophen (NORCO) 5-325 mg per tablet 1 Tab, 1 Tab, Oral, Q6H PRN, Dana Rowland MD, 1 Tab at 02/01/20 1125 
  sotalol (BETAPACE) tablet 80 mg, 80 mg, Oral, Q12H, Dana Rowland MD, 80 mg at 02/03/20 5587   nystatin (MYCOSTATIN) 100,000 unit/gram powder, , Topical, BID, Sidhom, Theresa Beach MD 
  0.9% sodium chloride infusion 250 mL, 250 mL, IntraVENous, PRN, Dana Rowland MD 
  albuterol-ipratropium (DUO-NEB) 2.5 MG-0.5 MG/3 ML, 3 mL, Nebulization, Q4H PRN, Dana Rowland MD, 3 mL at 01/20/20 2352   insulin lispro (HUMALOG) injection, , SubCUTAneous, AC&HS, Dana Rowland MD, Stopped at 01/31/20 2151   ondansetron (ZOFRAN) injection 4 mg, 4 mg, IntraVENous, Q6H PRN, Dana Rowland MD 
 
 
Assessment:  
 
Principal Problem: 
  Fall (1/17/2020) Active Problems: 
  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 Hematoma of right chest wall (1/17/2020) Coagulopathy ([address] from Eliquis adn Indocin use (1/17/2020) Acute pain of right knee (1/17/2020) Debility (1/17/2020) Fluid overload (1/17/2020) A-fib ([address] (1/17/2020) Hypotension (1/17/2020) Anticoagulated (1/17/2020) Chest wall hematoma (1/17/2020) Blood loss anemia (1/17/2020) Acute blood loss anemia (1/17/2020) Subcutaneous hematoma (1/17/2020) Chronic pulmonary edema (1/25/2020) Septic shock ([facility] (1/29/2020) UTI (urinary tract infection) (1/29/2020) Gram-negative bacteremia (1/29/2020) MONTEZ (acute kidney injury) ([facility] (1/30/2020) Plan:  
 
Bacteremia Due to UTI with septic shock. Shock is improved. Grew E.coli in urine from 1-. Continue current Ceftriaxone since 1-. WBC is up today. Will check CXR. MONTEZ Improving. Monitor renal function and intake and output. Avoid nephrotoxic agents. Hyperkalemia Will eliminate K source in diet. Monitor. May need Patiromer, or [facility]. CHF exacerbation. Improved. Acute metabolic encephalopathy Improved. alert and oriented to place, time and person now. AF Off [address] due to hematoma and risk of falls. Heart rate is controlled. Diabetes mellitus type 2 Monitor blood sugar. Cover with insulin sliding scale accordingly. I have discussed the plan of care with patient. DVT prophylaxis : SCD Disposition plan : CM is helping. Physical therapy to help with ambulation and strength. Signed By: Raymond Huang MD   
 February 3, 2020

## 2020-02-03 NOTE — PROGRESS NOTES
Problem: Patient Education: Go to Patient Education Activity Goal: Patient/Family Education Description 1. Patient will complete lower body bathing and dressing with mod A and adaptive equipment as needed. (UPDATED 1/30/2020) 2. Patient will complete toileting and toilet transfer with  mod A. (UPDATED 1/30/2020) 3. Patient will tolerate 23 minutes of OT treatment with 1-2 rest breaks to increase activity tolerance for ADLs. 4. Patient will complete functional transfers with  max  and adaptive equipment as needed. (UPDATED 1/30/2020) 5. Patient will complete functional activity while seated edge of bed unsupported with SBA and adaptive equipment as needed. (ADDED 1/30/2020) Timeframe: 7 visits Outcome: Progressing Towards Goal 
 
 
OCCUPATIONAL THERAPY: Daily Note and AM 2/3/2020 INPATIENT: OT Visit Days: 2 [place] / [place] / Product Type: Medicare /  
  
NAME/AGE/GENDER: Daphnie Rios is a 70 y.o. male PRIMARY DIAGNOSIS:  Chest wall hematoma [S20.219A] Hypotension [I95.9] A-fib ([address] [I48.91] Anticoagulated [Z79.01] Acute blood loss anemia [D62] Subcutaneous hematoma [T14. Caron Copping Fall Fall ICD-10: Treatment Diagnosis:  
 · Generalized Muscle Weakness (M62.81) · Other lack of cordination (R27.8) · Difficulty in walking, Not elsewhere classified (R26.2) Precautions/Allergies: 
  Fall precautions Clindamycin; Demerol [meperidine]; Losartan-hydrochlorothiazide; and Pcn [penicillins] ASSESSMENT:  
 
Mr. David Worley presents for the above diagnoses. Upon arrival, pt supine in bed and agreeable to OT evaluation. Pt is alert and oriented x4. Currently on 6L02 via high flow nasal canula. Pt states that he has never requires supplemental 02 prior to hospitalization. Pt reports living with wife in a 1-story home with 2 steps to enter.  At baseline, pt was independent with ADLs and MOD I for household mobility with use of rollator; uses motorized scooter for community level distances. 2/3/2020 Pt was supine in bed upon arrival. Pt completed grooming with max A. Pt required max-total A for bed mobility. Pt was unable to maintain sitting balance. Pt had minimal response to stimuli. Minimal progress made today due to alertness. Continue POC. At this time, patient is appropriate for Co-treatment with physical  therapy due to patient's clinical complexity, decreased overall endurance/tolerance levels, as well as need for high level assistance and cues and intervention to complete functional transfers/mobility and functional tasks in safe manner. [facility] is appropriate for a multidisciplinary co-treatment of PT and OT to address goals of both disciplines. This section established at most recent assessment PROBLEM LIST (Impairments causing functional limitations): 1. Decreased Strength 2. Decreased ADL/Functional Activities 3. Decreased Transfer Abilities 4. Decreased Ambulation Ability/Technique 5. Decreased Balance 6. Increased Pain 7. Decreased Activity Tolerance INTERVENTIONS PLANNED: (Benefits and precautions of occupational therapy have been discussed with the patient.) 1. Activities of daily living training 2. Adaptive equipment training 3. Balance training 4. Clothing management 5. Community reintergration 6. Donning&doffing training 7. Neuromuscular re-eduation 8. Re-evaluation 9. Therapeutic activity 10. Therapeutic exercise TREATMENT PLAN: Frequency/Duration: Follow patient 3x/week to address above goals. Rehabilitation Potential For Stated Goals: Good REHAB RECOMMENDATIONS (at time of discharge pending progress):   
Placement: It is my opinion, based on this patient's performance to date, that Mr. Rochelle Block may benefit from intensive therapy at a [address] [place] [address] after discharge due to the functional deficits listed above that are likely to improve with skilled rehabilitation and concerns that he/she may be unsafe to be unsupervised at home due to decline in ability to safely perform ADLs and functional mobility. . 
Equipment: ? TBD   
    
 
 
 
OCCUPATIONAL PROFILE AND HISTORY:  
History of Present Injury/Illness (Reason for Referral): 
See H&P Past Medical History/Comorbidities:  
Mr. Nafisa Hicks  has a past medical history of Arrhythmia, Arthritis, CAD (coronary artery disease), Cardiac pacemaker (11/12/2015), Chronic diastolic heart failure ([address] (11/12/2015), Chronic pain, Diabetes ([address] Diabetes mellitus type II, uncontrolled ([address] Dyspnea/shortness of breath (11/12/2015), GERD (gastroesophageal reflux disease), Heart failure ([address] HTN - uncontrolled, malignant (8/8/2016), Hyperlipidemia (11/12/2015), Hypertension, Malaise/fatigue/weakness/tiredness (11/12/2015), Mixed hyperlipidemia, Morbid obesity ([address] Orthostatic hypotension (11/12/2015), Other ill-defined conditions(799.89), Paroxysmal atrial fibrillation ([address] Permanent atrial fibrillation ([address] (11/12/2015), Tendon injury, and Unspecified sleep apnea. Mr. Nafisa Hicks  has a past surgical history that includes hx other surgical; hx hernia repair; pr left heart cath,percutaneous (3/31/2014); and hx pacemaker. Social History/Living Environment:  
Home Environment: Private residence # Steps to Enter: 1 One/Two Story Residence: One story Living Alone: No 
Support Systems: Spouse/Significant Other/Partner Patient Expects to be Discharged to[de-identified] Unknown Current DME Used/Available at Home: Belita Gallop, rollator, Commode, bedside Prior Level of Function/Work/Activity: 
Independent with ADLs and MOD I for household mobility with use of rollator; uses motorized scooter for community level distances Personal Factors:   
      Sex:  male Age:  70 y.o. Other factors that influence how disability is experienced by the patient:  multiple co-morbidities Number of Personal Factors/Comorbidities that affect the Plan of Care: Brief history (0):  LOW COMPLEXITY ASSESSMENT OF OCCUPATIONAL PERFORMANCE[de-identified]  
Activities of Daily Living:  
Basic ADLs (From Assessment) Complex ADLs (From Assessment) Feeding: Setup Oral Facial Hygiene/Grooming: Minimum assistance Bathing: Maximum assistance Upper Body Dressing: Moderate assistance Lower Body Dressing: Maximum assistance Toileting: Maximum assistance Instrumental ADL Meal Preparation: Total assistance Homemaking: Total assistance Grooming/Bathing/Dressing Activities of Daily Living Grooming Washing Face: Maximum assistance Cognitive Retraining Safety/Judgement: Fall prevention Bed/Mat Mobility Rolling: Maximum assistance; Total assistance;Assist x2 Supine to Sit: Maximum assistance;Assist x2 Sit to Supine: Total assistance;Assist x2 Scooting: Total assistance;Assist x2 Most Recent Physical Functioning:  
Gross Assessment: 
  
         
  
Posture: 
Posture (WDL): Exceptions to [facility] Posture Assessment: Cervical, Forward head, Rounded shoulders Balance: 
Sitting: Impaired Sitting - Static: Poor (constant support)(leaning R) Sitting - Dynamic: Poor (constant support) Bed Mobility: 
Rolling: Maximum assistance; Total assistance;Assist x2 Supine to Sit: Maximum assistance;Assist x2 Sit to Supine: Total assistance;Assist x2 Scooting: Total assistance;Assist x2 Wheelchair Mobility: 
  
Transfers: 
   
 
    
 
Patient Vitals for the past 6 hrs: 
 BP BP Patient Position SpO2 O2 Flow Rate (L/min) Pulse 02/03/20 0720 150/79 At rest 97 % 2 l/min 87  
02/03/20 1054    2 l/min   
02/03/20 1148 130/80 At rest 92 %  86 Mental Status Neurologic State: Drowsy, Eyes open to stimulus Orientation Level: Unable to verbalize(does not verbalize; nods head to name) Cognition: Decreased attention/concentration, Decreased command following Perception: Verbal, Tactile Perseveration: Tactile cues provided, Verbal cues provided Safety/Judgement: Fall prevention Physical Skills Involved: 
1. Balance 2. Strength 3. Activity Tolerance 4. Gross Motor Control Cognitive Skills Affected (resulting in the inability to perform in a timely and safe manner): 1. none  Psychosocial Skills Affected: 1. Habits/Routines 2. Environmental Adaptation Number of elements that affect the Plan of Care: 5+:  HIGH COMPLEXITY CLINICAL DECISION MAKIN[address] [place] [address] AM-PAC 6 Clicks Daily Activity Inpatient Short Form How much help from another person does the patient currently need. .. Total A Lot A Little None 1. Putting on and taking off regular lower body clothing? [] 1   [x] 2   [] 3   [] 4  
2. Bathing (including washing, rinsing, drying)? [] 1   [x] 2   [] 3   [] 4  
3. Toileting, which includes using toilet, bedpan or urinal?   [] 1   [x] 2   [] 3   [] 4  
4. Putting on and taking off regular upper body clothing? [] 1   [x] 2   [] 3   [] 4  
5. Taking care of personal grooming such as brushing teeth? [] 1   [] 2   [x] 3   [] 4  
6. Eating meals? [] 1   [] 2   [x] 3   [] 4  
© , Trustees of [address] [place] [address], under license to [employer]. All rights reserved Score:  Initial: 15 Most Recent:14 (Date: 2020) Interpretation of Tool:  Represents activities that are increasingly more difficult (i.e. Bed mobility, Transfers, Gait). Medical Necessity:    
· Patient demonstrates · good ·  rehab potential due to higher previous functional level. Reason for Services/Other Comments: 
· Patient continues to require skilled intervention due to · medical complications and patient unable to attend/participate in therapy as expected · . Use of outcome tool(s) and clinical judgement create a POC that gives a: LOW COMPLEXITY  
 
 
 
TREATMENT:  
(In addition to Assessment/Re-Assessment sessions the following treatments were rendered) Pre-treatment Symptoms/Complaints: \"It feels good being up. \" 
Pain: Initial:  
 /10 Post Session:  same Self Care: (10): Procedure(s) (per grid) utilized to improve and/or restore self-care/home management as related to grooming. Required max verbal and manual cueing to facilitate activities of daily living skills. Therapeutic Activity: (   16 ):  Therapeutic activities including bed mobility  to improve mobility, strength and balance. Required max to total A   to promote motor control of bilateral, upper extremity(s), lower extremity(s). Today's treatment session addressed Decreased Strength, Decreased ADL/Functional Activities, Decreased Transfer Abilities and Decreased Balance to progress towards achieving goal(s). During this session,  Physical Therapy addressed  Activity tolerance to progress towards their discipline specific goal(s). Co-treatment was necessary to improve patient's ability to increase activity demands. Braces/Orthotics/Lines/Etc:  
· hooker catheter · O2 Device: Hi flow nasal cannula Treatment/Session Assessment:   
· Response to Treatment:  tolerated well however decreased activity tolerance and 02 sats dropping with mobility. · Interdisciplinary Collaboration:  
o Physical Therapist 
o Certified Occupational Therapy Assistant 
o Registered Nurse · After treatment position/precautions:  
o Supine in bed 
o Bed/Chair-wheels locked 
o Bed in low position 
o Call light within reach 
o RN notified 
o Family at bedside 
o Side rails x 3  
· Compliance with Program/Exercises: Compliant all of the time, Will assess as treatment progresses. · Recommendations/Intent for next treatment session: \"Next visit will focus on advancements to more challenging activities and reduction in assistance provided\". Total Treatment Duration: OT Patient Time In/Time Out Time In: 1054 Time Out: 1120 Hudson Vera

## 2020-02-03 NOTE — PROGRESS NOTES
Problem: Falls - Risk of 
Goal: *Absence of Falls Description Document Fallon Ott Fall Risk and appropriate interventions in the flowsheet. Outcome: Progressing Towards Goal 
Note: Fall Risk Interventions: 
Mobility Interventions: Communicate number of staff needed for ambulation/transfer Mentation Interventions: Adequate sleep, hydration, pain control Medication Interventions: Evaluate medications/consider consulting pharmacy Elimination Interventions: Call light in reach History of Falls Interventions: Bed/chair exit alarm, Evaluate medications/consider consulting pharmacy, Investigate reason for fall, Room close to nurse's station

## 2020-02-03 NOTE — PROGRESS NOTES
END OF SHIFT NOTE: 
 
Intake/Output 02/03 0701 - 02/03 1900 In: -  
Out: 950 [[id number]] Voiding: YES Catheter: YES Drain:   
 
 
 
 
 
Stool:  0 occurrences. Stool Assessment Stool Color: (Have not observed) (01/28/20 0820) Stool Appearance: Formed (01/21/20 1650) Stool Amount: Medium (01/21/20 1650) Stool Source/Status: Rectum (01/21/20 1650) Emesis:  0 occurrences. VITAL SIGNS Patient Vitals for the past 12 hrs: 
 Temp Pulse Resp BP SpO2  
02/03/20 1613 98.6 °F (37 °C) 83 18 142/77 98 % 02/03/20 1444 98.4 °F (36.9 °C) 80 18 138/83 98 % 02/03/20 1148 96.2 °F (35.7 °C) 86 18 130/80 92 % 02/03/20 0720 96.1 °F (35.6 °C) 87 18 150/79 97 % Pain Assessment Pain 1 Pain Scale 1: Numeric (0 - 10) (02/03/20 1613) Pain Intensity 1: 0 (02/03/20 1613) Patient Stated Pain Goal: 0 (02/03/20 1148) Pain Reassessment 1: Patient resting w/respiratory rate greater than 10 (02/02/20 2101) Pain Onset 1: ongoing (01/30/20 1036) Pain Location 1: Hip (01/31/20 0801) Pain Orientation 1: Right (01/31/20 0801) Pain Description 1: Aching (01/31/20 0801) Pain Intervention(s) 1: Medication (see MAR) (02/01/20 1125) Ambulating No 
 
Additional Information: Lethargic all day. Went for CT to rule out stroke. CT was negative. Wife concerned of patient's status. VSS. No needs voiced. Shift report given to oncoming nurse at the bedside. Krishan Colbert

## 2020-02-03 NOTE — PROGRESS NOTES
Chart screened by  for discharge planning. Discharge plan is to go to [address] [place] [address] at time of discharge. Please consult  if any new issues arise Case management will continue to follow

## 2020-02-03 NOTE — PROGRESS NOTES
END OF SHIFT NOTE: 
 
Intake/Output 02/02 1901 - 02/03 0700 In: -  
Out: [address] Voiding: YES Catheter: YES Drain:   
 
 
 
 
 
Stool:  0 occurrences. Stool Assessment Stool Color: (Have not observed) (01/28/20 0820) Stool Appearance: Formed (01/21/20 1650) Stool Amount: Medium (01/21/20 1650) Stool Source/Status: Rectum (01/21/20 1650) Emesis:  0 occurrences. VITAL SIGNS Patient Vitals for the past 12 hrs: 
 Temp Pulse Resp BP SpO2  
02/03/20 0301 98.2 °F (36.8 °C) 78 16 167/86 98 % 02/02/20 2305 98.3 °F (36.8 °C) 76 18 168/83 98 % 02/02/20 1902 98.3 °F (36.8 °C) 80 18 155/76 98 % Pain Assessment Pain 1 Pain Scale 1: Adult Nonverbal Pain Scale (02/02/20 2101) Pain Intensity 1: 0 (02/02/20 2101) Patient Stated Pain Goal: 0 (02/02/20 2101) Pain Reassessment 1: Patient resting w/respiratory rate greater than 10 (02/02/20 2101) Pain Onset 1: ongoing (01/30/20 1036) Pain Location 1: Hip (01/31/20 0801) Pain Orientation 1: Right (01/31/20 0801) Pain Description 1: Aching (01/31/20 0801) Pain Intervention(s) 1: Medication (see MAR) (02/01/20 1125) Ambulating No 
 
Additional Information: all meds were held as patient was not alert enough to take medicines. However, patient became more alert as we completed mouthcare, pericare, and repositioned him. Patient has significant edema to RLE. Patient would benefit from a bariatric bed. Shift report given to oncoming nurse at the bedside. Theo Franco

## 2020-02-04 PROBLEM — I46.9 CARDIAC ARREST (HCC): Status: ACTIVE | Noted: 2020-01-01

## 2020-02-04 NOTE — PROGRESS NOTES
Patient was intubated with a number 7.5 ET Tube. Tube placement verified by auscultation and ETCO2 monitor. ET Tube is secured at the 24 cm tone at the teeth and on the right side. Patient was intubated by Isa Garcia RRT on the 1 attempt. Breath sounds are coarse. Patient is Negative for subcutaneous air and chest excursion is symmetric. Trachea is midline. Patient is also Negative for cyanosis and is Negative for pitting edema. Patient placed on ventilator on documented settings. All alarms are set and audible. Resuscitation bag is at the head of the bed. Ventilator Settings Mode FIO2 Rate Tidal Volume Pressure PEEP I:E Ratio Assist control  100 %   30 500 ml     8 cm H20 Peak airway pressure:    
Minute ventilation: 12 l/min

## 2020-02-04 NOTE — PROGRESS NOTES
Patient  after experiencing a Code Blue. The  ministered to the patient's wife and son who had arrived, other family members were on their way to the hospital. His family members are grieving appropriately. The  provided empathy, comfort, a spiritual presence, emotional support and prayer. KASSIE Chavez

## 2020-02-04 NOTE — DISCHARGE SUMMARY
Hospitalist Death Summary Name:  Johana Nicholson Age:  70 y.o. 
:   1940 MRN:   [id number] PCP:   Bunny Stephenson MD 
Admit date:  2020  3:33 PM 
Treatment Team: Attending Provider: Karishma Cheng MD; Utilization Review: Yulissa Jason RN; Utilization Review: Denise Hinson RN; Occupational Therapy Assistant: Kathi Evangelista; Care Manager: Susan Venegas RN 
 
Problem List for this Hospitalization: 
Hospital Problems as of 2020 Date Reviewed: 7/15/2019 Codes Class Noted - Resolved POA Cardiac arrest [facility]) ICD-10-CM: I46.9 ICD-9-CM: 427.5  2020 - Present Unknown Hyperkalemia ICD-10-CM: E87.5 ICD-9-CM: 276.7  2/3/2020 - Present Unknown MONTEZ (acute kidney injury) ([facility] [address] ICD-10-CM: N17.9 ICD-9-CM: 584.9  2020 - Present Unknown Septic shock (HCC) ICD-10-CM: A41.9, R65.21 ICD-9-CM: 038.9, 785.52, 995.92  2020 - Present Unknown UTI (urinary tract infection) ICD-10-CM: N39.0 ICD-9-CM: 599.0  2020 - Present Unknown Gram-negative bacteremia ICD-10-CM: R78.81 ICD-9-CM: 790.7, 041.85  2020 - Present Unknown Leukocytosis ICD-10-CM: N35.181 ICD-9-CM: 288.60  2020 - Present Chronic pulmonary edema ICD-10-CM: J81.1 ICD-9-CM: 897  2020 - Present Unknown * (Principal) Fall ICD-10-CM: W19. Dina Mendez ICD-9-CM: E888.9  2020 - Present Yes Hematoma of right chest wall ICD-10-CM: N21.655C ICD-9-CM: 922.1  2020 - Present Yes Coagulopathy ([facility] [address] from Eliquis adn Indocin use ICD-10-CM: D68.9 ICD-9-CM: 286.9  2020 - Present Yes Acute pain of right knee ICD-10-CM: M25.561 ICD-9-CM: 719.46  2020 - Present Yes Debility ICD-10-CM: R53.81 ICD-9-CM: 799.3  2020 - Present Yes Fluid overload ICD-10-CM: E87.70 ICD-9-CM: 276.69  2020 - Present Yes  A-fib [facility]) ICD-10-CM: I48.91 
 ICD-9-CM: 427.31  1/17/2020 - Present Unknown Hypotension ICD-10-CM: I95.9 ICD-9-CM: 458.9  1/17/2020 - Present Unknown Anticoagulated ICD-10-CM: Z79.01 
ICD-9-CM: V58.61  1/17/2020 - Present Unknown Chest wall hematoma ICD-10-CM: S20.219A 
ICD-9-CM: 922.1  1/17/2020 - Present Unknown Blood loss anemia ICD-10-CM: D50.0 ICD-9-CM: 280.0  1/17/2020 - Present Unknown Acute blood loss anemia ICD-10-CM: D62 
ICD-9-CM: 285.1  1/17/2020 - Present Unknown Subcutaneous hematoma ICD-10-CM: T14. 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 The patient has been having unsteady gait and frequent falls lately and for that reason he has been evaluated by cardiology in order to have a watchman device inserted. This afternoon the patient lost his balance and while he was walking with the help of his Rollator walker he felt on his right side. He hit the right side of his chest against a Rollator and he also suffered a minor trauma on the right knee. He called the EMS and by the time they arrived to the scene the patient was already up. He had a small hematoma in the right upper part of his chest and for that reason the EMS left his home after he told him that he was feeling fine. Unfortunately within the next hour his hematoma increased in size, it became very painful and hard and for that reason he called back to EMS.  
When he arrived to the emergency room his vital signs were blood pressure of 100/52, heart rate of 76 respiratory rate of 16 O2 saturation of 97%. The patient was awake and alert. His lungs were clear to auscultation. He had a hematoma the size of cantaloupe in the upper part of his right chest.  Initial blood work-up reported a white blood cell count of 14.5, hemoglobin of 7.3, platelets of 725, his INR was 1.5, his sodium was 138, potassium 4.5, glucose 77, creatinine 1.3, total bilirubin of 0.3. His proBNP was 2000. General surgery was consulted they assessed the patient and recommended conservative management for now. The hospitalist team was consulted. The patient was assessed in the emergency room and he was slightly hypotensive but awake and alert. Apparently he became hypotensive after receiving a dose of intravenous morphine. He is receiving 1 bolus of normal saline now. 2 units of packed RBCs have been ordered. The patient has important bleeding but is not or does not seem to be life-threatening bleeding. Isidro Southern will not be ordered for now. Tranexamic acid has been ordered. We will monitor his vital signs and his hemoglobin level closely. \" Hospital Course: Pt was admitted on 1/17 with hypotension and significant blood loss anemia secondary to large chest hematoma. Eliquis was stopped. Gen surg was consulted and recommended  No surgical intervention. Patient initially stabilized. However, around 1/22, patient became hypotensive again, also became hypoxic secondary to blood transfusions and IVF. Also developed encephalopathy secondary to UTI. Went into septic shock on 1/28 and was transferred to ICU. Treated with Rocephin, found to be bacteremic with E. coli. Improved and was able to be moved out of ICU to medical floor. Earlier this morning, CASSIE WEBSTER was called, as patient was found pulseless by nursing staff. He had been seen at baseline approx 1 hour prior.  Chest compressions, 3 rounds of Epinephrine, 2 g Calcium gluconate and 1 amp of bicarb were given, after which patient went into V Fib. 1 more round of epinephrine, 600 mg total of Amiodarone and 2 defibrillations were performed, at which point patient reattained ROSC. Pulse maintained for approx 3-5 minutes before patient became asystolic again. CODE BLUE resumed, patient given another 1 g Calcium gluconate, 1 amp bicarbonate, and 4 rounds of epinephrine were given before the patient went into V Fib yet again. 1 shock and 1 round of epinephrine later, patient reattained ROSC yet again. However, yet again, after approx 3-5 minutes, patient went asystolic and lost pulse again. Time of death was called at 8155. Time of Death:  
7018 Cause of Death:  
Cardiopulmonary arrest 
 
Significant Diagnostic Imaging/Tests:  
Ct Chest W Cont Result Date: 1/20/2020 CT chest with contrast History: Status post fall today, enlarging hematoma along right chest wall. . Technique: Helically acquired images were obtained from the lung apices to the domes of the diaphragms reconstructed at 5 mm thickness after the uneventful administration of 80 mL of intravenous Optiray-350 in order to better evaluate the mediastinal structures. Coronal reformatted images were submitted. Radiation dose reduction techniques were used for this study:  Our CT scanners use one or all of the following: Automated exposure control, adjustment of the mA and/or kVp according to patient's size, iterative reconstruction. Comparison: None. Correlation is made to the chest x-ray performed earlier on the same day. Findings: There is no pleural or pericardial effusion. The heart and great vessels are unremarkable. There are no airspace opacities. There is mild peripheral interstitial prominence suggesting underlying interstitial lung disease. Within the left lower lobe on image 30 is a 5 mm nodule. Few additional calcified nodules are present. No adenopathy is present within the thorax.  There is a large hematoma within the right breast measuring approximately 9.7 x 13.4 cm. There are areas of central decreased attenuation which can be seen as a result of active bleeding. There is diffuse edema with smaller nearby masses, presumably representing additional sites of hematoma more superficially. No acute rib fracture is present. Imaging of the upper abdomen reveals a low-density structure within the upper pole right kidney measuring 2.1 cm, not confidently characterized as a simple cyst. There is a 1.7 cm right adrenal gland nodule, incompletely characterized. IMPRESSION: 1. Large right chest/breast hematoma with findings raising suspicion for active bleeding. 2. Mild interstitial opacities suggesting underlying chronic change. 3. Left lower lobe nodule. If the patient is considered high-risk for malignancy, follow-up scanning in one year would be recommended. Otherwise no further follow-up is required. 4. Indeterminate low density upper pole left renal lesion. CT scanning using a renal mass protocol may be beneficial for further evaluation on a nonemergent basis. This could also assess the indeterminate right adrenal gland nodule. Xr Chest Shelbie Norwood Result Date: 1/17/2020 Clinical History: The patient is a 70years year old Male presenting with symptoms of cough Comparison:  Chest x-ray 12/22/2019 Findings:  Frontal view of the chest was obtained. There is mildly improved pulmonary vascular congestion since prior exam, however with continued central vascular congestion. Small basilar effusions are suggested particularly on the right. The cardiomediastinal silhouette is within normal limits. There are no acute osseous abnormalities. A left subclavian pacing device remains in place. Impression:  CHF/volume overload however improved over prior exam. CPT code(s) 02287 Xr Knee Rt 3 V Result Date: 1/17/2020 Clinical History: The patient is a 70years year old Male presenting with symptoms of pain. Comparison:  none Findings: 4 views of the right knee were obtained. No fracture or dislocation is identified. There is moderate to severe medial joint space loss. No evidence of joint effusion is demonstrated. There is incidental vascular calcification. Impression: Moderate to severe medial joint space loss. Results for orders placed or performed during the hospital encounter of 20  
2D ECHO COMPLETE ADULT (TTE) W OR WO CONTR Narrative [address] [place], [address] [facility] 
[phone] Transthoracic Echocardiogram 
2D, M-mode, Doppler, and Color Doppler Patient: Jo Humphreys 
MR #: [id number] : 1940 Age: 70 years Gender: Male Study date: 2020 Account #: [de-identified] Height: 75 in 
Weight: 334.4 lb 
BSA: 2.73 mï¾² Status:Routine Location: 237 BP: 142/ 80 Allergies: CLINDAMYCIN, MEPERIDINE, LOSARTAN-HYDROCHLOROTHIAZIDE, PENICILLINS Sonographer:  Leslye Desai [facility] Group:  [facility] Referring Physician:  Mari Knapp. Rad Cervantes MD 
Reading Physician:  Jimy Vaughn. Denis Franz MD [facility] INDICATIONS: Pulmonary Edema PROCEDURE: This was a routine study. A transthoracic echocardiogram was 
performed. The study included complete 2D imaging, M-mode, complete spectral 
Doppler, and color Doppler. Intravenous contrast (Definity) was administered. Echocardiographic views were limited by restricted patient mobility and poor 
acoustic window availability. This was a technically difficult study. LEFT VENTRICLE: Size was normal. Systolic function was normal. Ejection 
fraction was estimated in the range of 20 % to 25 %. There was severe 
hypokinesis of the entire anterior, mid anteroseptal, mid inferoseptal,  
entire 
inferior, mid anterolateral, apical septal, apical lateral, and apical  
wall(s). There was mild concentric hypertrophy. The study was not technically  
sufficient to allow evaluation of LV diastolic function. RIGHT VENTRICLE: The size was normal. Systolic function was normal. A pacing 
wire was present. The tricuspid jet envelope definition was inadequate for 
estimation of RV systolic pressure. LEFT ATRIUM: The atrium was mildly dilated. RIGHT ATRIUM: Not well visualized. SYSTEMIC VEINS: IVC: The inferior vena cava was mildly dilated. The 
respirophasic change in diameter was less than 50%. AORTIC VALVE: The valve was probably trileaflet. There was no evidence for 
stenosis. There was no insufficiency. MITRAL VALVE: Valve structure was normal. There was no evidence for stenosis. There was no regurgitation. TRICUSPID VALVE: The valve structure was normal. There was no evidence for 
stenosis. There was mild regurgitation. PULMONIC VALVE: Not well visualized. There was no evidence for stenosis. There 
was trivial regurgitation. There was no insufficiency. PERICARDIUM: A possible, small pericardial effusion was identified posterior  
to 
the heart. AORTA: The root exhibited normal size. SUMMARY: 
 
-  Left ventricle: Systolic function was normal. Ejection fraction was 
estimated in the range of 20 % to 25 %. There was severe hypokinesis of the 
entire anterior, mid anteroseptal, mid inferoseptal, entire inferior, mid 
anterolateral, apical septal, apical lateral, and apical wall(s). There was 
mild concentric hypertrophy. -  Left atrium: The atrium was mildly dilated. -  Inferior vena cava, hepatic veins: The inferior vena cava was mildly 
dilated. The respirophasic change in diameter was less than 50%. -  Tricuspid valve: There was mild regurgitation. 
 
-  Pericardium: A possible, small pericardial effusion was identified  
posterior 
to the heart. SYSTEM MEASUREMENT TABLES 
 
2D mode AoR Diam (2D): 3.5 cm 
LA Dimension (2D): 4.7 cm IVS/LVPW (2D): 0.9 IVSd (2D): 1.3 cm LVIDd (2D): 4.4 cm LVIDs (2D): 3.1 cm 
 LVOT Area (2D): 3.5 cm2 LVPWd (2D): 1.3 cm RVIDd (2D): 3.3 cm Unspecified Scan Mode LVOT Diam: 2.1 cm Prepared and signed by 
 
Janis Olson. Emerson Gaines MD [facility] Signed 22-Jan-2020 14:55:59 All Micro Results Procedure Component Value Units Date/Time CULTURE, BLOOD [[id number]]  (Abnormal)  (Susceptibility) Collected:  01/28/20 0034 Order Status:  Completed Specimen:  Blood Updated:  02/03/20 0756 Special Requests: --     
  RIGHT 
HAND 
  
  GRAM STAIN GRAM NEGATIVE RODS ANAEROBIC BOTTLE POSITIVE RESULTS VERIFIED, PHONED TO AND READ BACK BY [facility] Anuel RN @[facility] [place] [id number]  Culture result: ESCHERICHIA COLI     
   REFER TO BIOFIRE PANEL [id number] CULTURE, BLOOD [[id number]] Collected:  01/28/20 0034 Order Status:  Completed Specimen:  Blood Updated:  02/02/20 5265 Special Requests: --     
  LEFT 
HAND Culture result: NO GROWTH 5 DAYS     
 CULTURE, URINE [[id number]]  (Abnormal)  (Susceptibility) Collected:  01/28/20 0401 Order Status:  Completed Specimen:  Clean catch Updated:  01/31/20 2552 Special Requests: NO SPECIAL REQUESTS Culture result:    
  >100,000 COLONIES/mL ESCHERICHIA COLI  
     
      
  10,000 to 50,000 COLONIES/mL MIXED SKIN JENNIFER ISOLATED  
     
 BLOOD CULTURE ID PANEL [[id number]]  (Abnormal) Collected:  01/28/20 0034 Order Status:  Completed Specimen:  Blood Updated:  01/29/20 0754 Acc. no. from apomio [id number] Escherichia coli DETECTED Comment: RESULTS VERIFIED, PHONED TO AND READ BACK BY 
MOJGAN TELLES RN AT 5929 1.29.20  
  
  
  KPC (Carbapenem Resistance Gene) NOT DETECTED Comment: WARNING:  A Not Detected result for the KPC gene does not indicate susceptibility to carbapenems. Gram negative bacteria can be resistant to carbapenems by mechanisms other than carrying the KPC gene. INTERPRETATION Gram negative cheryle. Identified by realtime PCR as E. coli Labs: Results: BMP Recent Labs 02/03/20 
0340 02/02/20 
0355  140  
K 5.5* 5.4*  
* 106 CO2 21 21 AGAP 13 13 BUN 50* 56* CREA 1.44 1.72* CA 9.0 9.1 * 149* CBC w/Diff Recent Labs 02/03/20 
0340 02/02/20 
0355 WBC 25.3* 16.2*  
RBC 4.71 4.59 HGB 11.1* 10.9* HCT 38.1* 37.4*  
 241 GRANS 79* 83* LYMPH 6* 7* EOS 0* 0* LFTs No results for input(s): SGOT, ALT, TBIL, AP, TP, ALB, GLOB, AGRAT, GPT in the last 72 hours. Cardiac Markers Lab Results Component Value Date/Time Troponin-I, Qt. <0.02 (L) 12/21/2019 10:05 PM  
 Troponin-I, Qt. <0.04 07/14/2015 09:22 PM  
  05/07/2014 11:34 AM  
 BNP 92 04/03/2014 05:28 AM  
 BNP 83 04/02/2014 04:55 AM  
  
Coagulation No results for input(s): PTP, INR, APTT, INREXT in the last 72 hours. Last A1c Lab Results Component Value Date/Time Hemoglobin A1c 7.0 (H) 12/23/2019 04:58 AM  
  
Lipid Panel Lab Results Component Value Date/Time Cholesterol, total 132 04/01/2014 05:15 AM  
 HDL Cholesterol 31 (L) 04/01/2014 05:15 AM  
 LDL, calculated 77.4 04/01/2014 05:15 AM  
 VLDL, calculated 23.6 (H) 04/01/2014 05:15 AM  
 Triglyceride 118 04/01/2014 05:15 AM  
 CHOL/HDL Ratio 4.3 04/01/2014 05:15 AM  
  
Thyroid Studies No results found for: T4, T3U, TSH, TSHEXT Urinalysis Lab Results Component Value Date/Time Color YELLOW 01/28/2020 04:01 AM  
 Appearance CLEAR 01/28/2020 04:01 AM  
 Specific gravity 1.009 01/28/2020 04:01 AM  
 pH (UA) 6.0 01/28/2020 04:01 AM  
 Protein 30 (A) 01/28/2020 04:01 AM  
 Glucose NEGATIVE  01/28/2020 04:01 AM  
 Ketone NEGATIVE  01/28/2020 04:01 AM  
 Bilirubin NEGATIVE  01/28/2020 04:01 AM  
 Blood NEGATIVE  01/28/2020 04:01 AM  
 Urobilinogen 0.2 01/28/2020 04:01 AM  
 Nitrites NEGATIVE  01/28/2020 04:01 AM  
 Leukocyte Esterase SMALL (A) 01/28/2020 04:01 AM  
  
 
All Labs from Last 24 Hrs: 
Recent Results (from the past 24 hour(s)) GLUCOSE, POC  
 Collection Time: 20  7:18 AM  
Result Value Ref Range Glucose (POC) 141 (H) 65 - 100 mg/dL GLUCOSE, POC Collection Time: 20 11:41 AM  
Result Value Ref Range Glucose (POC) 123 (H) 65 - 100 mg/dL GLUCOSE, POC Collection Time: 20  4:11 PM  
Result Value Ref Range Glucose (POC) 143 (H) 65 - 100 mg/dL GLUCOSE, POC Collection Time: 20  8:55 PM  
Result Value Ref Range Glucose (POC) 141 (H) 65 - 100 mg/dL POC CG8I ISTAT, VENOUS Collection Time: 20  4:56 AM  
Result Value Ref Range Device: AMBU    
 FIO2 (POC) 100 %  
 pH, venous (POC) 7.053 (L) 7.32 - 7.42    
 pCO2, venous (POC) 82.4 (H) 41 - 51 MMHG  
 pO2, venous (POC) 17 (LL) mmHg HCO3, venous (POC) 22.9 (L) 23 - 28 MMOL/L  
 sO2, venous (POC) 13 (L) 65 - 88 % Allens test (POC) NOT APPLICABLE Site RIGHT FEMORAL Specimen type (POC) VENOUS BLOOD Glucose,  (H) 65 - 100 MG/DL Performed by Ernestina Critical value read back 04:56 COLLECT TIME 450 Discharge Exam: 
Eyes: Pupils fixed/nonreactive Lungs: No breath sounds. Heart:  No heart sounds Neurologic: unresponsive Disposition:  May his memory be eternal. 
 
Signed: 
Pato Bernardo MD

## 2020-04-06 NOTE — PROGRESS NOTES
[address] [facility] OUTREACH NURSE PROGRESS REPORT SUBJECTIVE: Called to assess patient secondary to transfer from critical care on 1/30. MEWS Score: 4 (01/31/20 1126) Vitals:  
 01/31/20 0124 01/31/20 0340 01/31/20 0720 01/31/20 1126 BP:  156/72 (!) 162/100 (!) 173/100 Pulse:  84 82 83 Resp:  18 20 21 Temp:  98.1 °F (36.7 °C) 97.5 °F (36.4 °C) 97.4 °F (36.3 °C) SpO2: 94% 95% 94% 98% Weight:      
Height:      
  
 
LAB DATA: 
 
Recent Labs  
  01/31/20 
0421 01/30/20 
0348 01/29/20 
0417  136 137  
K 4.7 4.7 5.3*  
 103 103 CO2 24 25 24 AGAP 9 8 10 * 181* 176* BUN 57* 55* 44* CREA 2.48* 3.21* 3.82* GFRAA 33* 24* 20* GFRNA 27* 20* 16*  
CA 8.2* 7.8* 8.1* ALB  --   --  1.9* TP  --   --  5.9*  
GLOB  --   --  4.0* AGRAT  --   --  0.5* ALT  --   --  93* Recent Labs  
  01/31/20 
0421 01/30/20 0348 01/29/20 
6744 WBC 14.8* 14.3* 23.8* HGB 8.9* 8.3* 9.1*  
HCT 29.4* 27.2* 30.1*  
 195 283 OBJECTIVE: On arrival to room, I found patient to be sitting up in bed, talking with nursing students Pain Assessment Pain Intensity 1: 0 (01/31/20 0900) Pain Location 1: Hip Pain Intervention(s) 1: Medication (see MAR) Patient Stated Pain Goal: 0 
 
  
  
  
  
 
  
  
  
   
 
ASSESSMENT:  Patient remains alert, oriented x4. SpO2 95% on 2L NC, HR 85. Goldman remains in place and draining PLAN:  Continue to follow per critical care outreach protocol. Janae

## 2023-07-17 NOTE — CONSULTS
CHIEF COMPLAINT  Chief Complaint   Patient presents with   • Cough     Entered by patient       HISTORY OF PRESENT ILLNESS:    HPI     Patient is a 56-year-old female who presents to urgent care with URI symptoms since Saturday.  Symptoms include nonproductive cough, body aches, chills/sweats, nasal congestion and loss of taste.  States she did have sore throat yesterday but this has since resolved.  She has not taken her temperature so no known fevers.  Denies headache, shortness of breath, wheezing, chest pain, GI symptoms.  Did take some ibuprofen today at around 10:00 a.m..  Tried taking DayQuil yesterday with minimal relief.  States her  was sick with similar symptoms and his orders starting to improve.  Also states that a co-worker was recently sick with COVID    The following portions of the patient's history were reviewed including the  nurses' notes and the following were updated as appropriate: allergies, current medications, past family history, past medical history, past social history and past surgical history.    Past Medical History:   Diagnosis Date   • Anxiety    • Depression    • GERD (gastroesophageal reflux disease)     none in years   • HTN (hypertension)    • Palpitations     benign work up; not recently bothersome as of 2019   • Recurrent sinusitis     no issues for a few yrs as of 2019        ALLERGIES:  Vicodin [hydrocodone-acetaminophen]    Past Surgical History:   Procedure Laterality Date   • Cholecystectomy      no complications       Family History   Problem Relation Age of Onset   • Hyperlipidemia Father    • Hypertension Father    • Patient is unaware of any medical problems Mother    • Hypertension Other    • Patient is unaware of any medical problems Sister    • Seizure Disorder Maternal Grandmother    • Seizure Disorder Maternal Grandfather    • Cancer, Breast Other 30        cousin   • Cancer, Colon Maternal Uncle    • Other Son          in    • Osteoporosis Neg  H&P/Consult Note/Progress Note/Office Note:  
Walter Alfred  MRN: [id number]  S:[birth year]  Age:79 y.o. 
 
HPI: Walter Alfred is a 70 y.o. male who on Eliquis for afib who came to the ER on 1/17/20 after a fall 1-2 hrs prior to arrival. 
He also takes Indocin He had swelling and hematoma of the right upper anterior chest 
He fell from a standing position while using a Rollator and landed on the device and hit is right anterior chest.  
He developed progressive severe swelling and hematoma He is on Eliquis for atrial fibrillation. Over the past 2 hours since the fall the hematoma has expanded to a small melon. Very painful and firm. No SOB. Denies any hip or back pain. Also has associated right knee pain No LOC or head trauma No blood loss. Point of impact: right chest wall. Pain location: right chest wall. The pain is moderate. Pertinent negatives include no visual change, no fever, no numbness, no abdominal pain, no nausea, no vomiting, no hematuria, no headaches The risk factors include being elderly. The symptoms are aggravated by pressure on injury. He has tried nothing for the symptoms. CXR and right knee xrays as below. Labs are pending 1/17/20 CXR, 1 views Hx: cough Comparison:  CXR 12/22/19 
  
There is mildly improved pulmonary vascular congestion since prior exam, however 
with continued central vascular congestion. Small basilar effusions are 
suggested particularly on the right. The cardiomediastinal silhouette is within 
normal limits. There are no acute osseous abnormalities. A left subclavian 
pacing device remains in place. 
  
Impression:  CHF/volume overload however improved over prior exam. 
  
 
  
1/17/20 right knee, 4 views No fracture or dislocation is identified. There is moderate to severe medial joint space loss. No evidence of joint effusion is demonstrated.   
There is incidental vascular calcification. 
  
 Hx    • Thyroid Neg Hx        Social History     Tobacco Use   • Smoking status: Never   • Smokeless tobacco: Never   Substance Use Topics   • Alcohol use: Yes     Alcohol/week: 0.0 - 1.0 standard drinks of alcohol     Comment: ~occasionally   • Drug use: No       MEDICATIONS:    Current Outpatient Medications   Medication Sig Dispense Refill   • metoPROLOL succinate (TOPROL-XL) 50 MG 24 hr tablet Take 1 tablet by mouth daily. 90 tablet 1   • venlafaxine (EFFEXOR) 100 MG tablet Take 1 tablet by mouth in the morning and 1 tablet in the evening. APPT NEEDED FOR FURTHER REFILLS 180 tablet 0   • cholecalciferol (VITAMIN D3) 1000 UNITS tablet Take 1 tablet by mouth daily.     • Multiple Vitamins-Minerals (MULTIVITAMIN ADULT) Tab      • fluticasone (FLONASE) 50 MCG/ACT nasal spray Spray 2 sprays in each nostril daily. (Patient taking differently: Spray 2 sprays in each nostril as needed.) 48 g 2     No current facility-administered medications for this visit.       REVIEW OF SYSTEMS:  Review of Systems   All other systems reviewed and are negative.     See HPI    PHYSICAL EXAM:  Visit Vitals  /84   Pulse 76   Temp 98.6 °F (37 °C) (Tympanic)   Resp 14   Ht 5' 2\" (1.575 m) Comment: ~per patient   Wt 77.1 kg (170 lb)   LMP 03/08/2023 (Approximate)   SpO2 98%   BMI 31.09 kg/m²       Physical Exam  Vitals and nursing note reviewed.   Constitutional:       General: She is not in acute distress.     Appearance: Normal appearance.   HENT:      Head: Normocephalic and atraumatic.      Right Ear: Tympanic membrane, ear canal and external ear normal.      Left Ear: Tympanic membrane, ear canal and external ear normal.      Nose: Congestion and rhinorrhea present.      Mouth/Throat:      Mouth: Mucous membranes are moist.      Pharynx: Oropharynx is clear. Posterior oropharyngeal erythema present. No oropharyngeal exudate.   Eyes:      Extraocular Movements: Extraocular movements intact.      Conjunctiva/sclera: Conjunctivae  Impression: Moderate to severe medial joint space loss. 
  
 
 
 
 
Past Medical History:  
Diagnosis Date  Arrhythmia Paroxysmal Atrial fibrillation  Arthritis   
 hip and knee  CAD (coronary artery disease)  Cardiac pacemaker 11/12/2015  Chronic diastolic heart failure ([facility] [address] 11/12/2015  Chronic pain   
 hips & knees  Diabetes ([facility] [address]  Diabetes mellitus type II, uncontrolled ([address] adult onset  Dyspnea/shortness of breath 11/12/2015  GERD (gastroesophageal reflux disease)  Heart failure ([facility] [address]  HTN - uncontrolled, malignant 8/8/2016  Hyperlipidemia 11/12/2015  Hypertension   
 malignant, uncontrolled  Malaise/fatigue/weakness/tiredness 11/12/2015  Mixed hyperlipidemia  Morbid obesity ([address]  Orthostatic hypotension 11/12/2015  Other ill-defined conditions(799.89) mixed hyperlipidemia  Paroxysmal atrial fibrillation (HCC)   
 spontaneously converted from Afib to sinus with sotalol, no eCV  Permanent atrial fibrillation ([facility] [address] 11/12/2015  Tendon injury R foot  Unspecified sleep apnea Past Surgical History:  
Procedure Laterality Date  HX HERNIA REPAIR    
 HX OTHER SURGICAL    
 hemorrhoidectomy  HX PACEMAKER    
 NE LEFT HEART CATH,PERCUTANEOUS  3/31/2014 BMS to mid LAD Current Facility-Administered Medications Medication Dose Route Frequency  sodium chloride 0.9 % bolus infusion 1,000 mL  1,000 mL IntraVENous ONCE  
 0.9% sodium chloride infusion 250 mL  250 mL IntraVENous PRN Current Outpatient Medications Medication Sig  cephALEXin (KEFLEX) 250 mg capsule Take 1 Cap by mouth four (4) times daily.  insulin lispro (HUMALOG) 100 unit/mL injection INITIATE INSULIN CORRECTIVE PROTOCOL: 
INITIATE INSULIN CORRECTIVE PROTOCOL: 
Normal Insulin Sensitivity For Blood Sugar (mg/dL) of:    
Less than 150 =   0 units 150 -199 =   2 units 200 -249 =   4 units 250 -299 =   6 units 300 -349 =   8 units 350 and above = 10 units and Call Physician Initiate Hypoglycemia protocol if blood glucose is <70 mg/dL Fast Acting - Administer Immediately - or within 15 minutes of start of meal, if mealtime coverage.  insulin detemir U-100 (LEVEMIR U-100 INSULIN) 100 unit/mL injection 6 Units by SubCUTAneous route nightly.  ondansetron (ZOFRAN ODT) 4 mg disintegrating tablet Take 1 Tab by mouth every eight (8) hours as needed for Nausea.  omeprazole (PRILOSEC) 20 mg capsule Take 20 mg by mouth.  sotalol (BETAPACE) 120 mg tablet Take 1 Tab by mouth every twelve (12) hours.  apixaban (ELIQUIS) 5 mg tablet Take 1 Tab by mouth two (2) times a day.  HYDROcodone-acetaminophen (NORCO) 7.5-325 mg per tablet Take 1 Tab by mouth.  probenecid (BENEMID) 500 mg tablet Take 500 mg by mouth two (2) times a day.  indomethacin (INDOCIN) 50 mg capsule Take  by mouth as needed.  traMADol (ULTRAM) 50 mg tablet Take 50 mg by mouth every six (6) hours as needed for Pain.  clotrimazole-betamethasone (LOTRISONE) topical cream Apply  to affected area two (2) times a day.  aspirin 81 mg chewable tablet Take 81 mg by mouth daily.  gabapentin (NEURONTIN) 300 mg capsule Take 300 mg by mouth two (2) times a day.  tamsulosin (FLOMAX) 0.4 mg capsule Take 0.4 mg by mouth daily.  ATORVASTATIN CALCIUM (ATORVASTATIN PO) Take 10 mg by mouth nightly.  furosemide (LASIX) 40 mg tablet Take 40 mg by mouth as needed. Clindamycin; Demerol [meperidine]; Losartan-hydrochlorothiazide; and Pcn [penicillins] Social History Socioeconomic History  Marital status:  Spouse name: Not on file  Number of children: Not on file  Years of education: Not on file  Highest education level: Not on file Tobacco Use  Smoking status: Former Smoker Years: 8.00 Last attempt to quit: 3/25/1970 Years since quittin.8  Smokeless tobacco: Never Used normal.   Cardiovascular:      Rate and Rhythm: Normal rate and regular rhythm.      Heart sounds: Normal heart sounds. No murmur heard.  Pulmonary:      Effort: Pulmonary effort is normal. No respiratory distress.      Breath sounds: Normal breath sounds. No wheezing, rhonchi or rales.   Musculoskeletal:      Cervical back: Neck supple.   Lymphadenopathy:      Cervical: No cervical adenopathy.   Skin:     General: Skin is warm and dry.   Neurological:      General: No focal deficit present.      Mental Status: She is alert.   Psychiatric:         Mood and Affect: Mood normal.         Behavior: Behavior normal.        Results for orders placed or performed in visit on 07/17/23   POCT SARS-COV-2 ANTIGEN   Result Value    POCT SARS-COV2-ANTIGEN (KRYSTIN) Detected (A)           ASSESSMENT/PLAN:    Barb was seen today for cough.    Diagnoses and all orders for this visit:    COVID-19 virus infection    Cough, unspecified type  -     POCT SARS-COV-2 ANTIGEN      Patient afebrile, nontoxic appearance.  Is not hypoxic, tachypneic, showing any increased work of breathing. No chest pain or SOB.  Patient has no focal infectious signs or symptoms of any other acute bacterial infection at this time.  Clinically consistent with viral illness.  +COVID-19 test     We discussed treatment for Paxlovid and reviewed the risks vs. Benefits.  Patient declines this treatment      CDC guidelines to discontinue public isolation are as follows:  • Stay home for 5 days   • If you have no symptoms or your symptoms are resolving after 5 days, you can leave your house.  • Continue to wear a mask around others for 5 additional days  • If you have a fever, continue to stay home until your fever resolves.    Work excuse provided    Precautions discussed of when to return or go to the Emergency Department.  Patient expressed understanding and in agreement with the plan at this time. Questions asked and answered.     Discharge plan: AVS printed,  Substance and Sexual Activity  Alcohol use: No  
 Drug use: No  
 
Social History Tobacco Use Smoking Status Former Smoker  Years: 8.00  Last attempt to quit: 3/25/1970  Years since quittin.8 Smokeless Tobacco Never Used Family History Problem Relation Age of Onset  Diabetes Mother  Cancer Father  Diabetes Brother  Hypertension Brother ROS: The patient has no difficulty with chest pain or shortness of breath. No fever or chills. Comprehensive review of systems was otherwise unremarkable except as noted above. Physical Exam:  
Visit Vitals BP (!) 87/52 Pulse 76 Temp 97.6 °F (36.4 °C) Resp 16 Wt 330 lb (149.7 kg) SpO2 91% BMI 41.25 kg/m² Vitals:  
 20 1541 20 1615 20 1635 BP: 100/52 (!) 89/44 (!) 87/52 Pulse: 76 Resp: 16 Temp: 97.6 °F (36.4 °C) SpO2: 97% 93% 91% Weight: 330 lb (149.7 kg) No intake/output data recorded. No intake/output data recorded. Constitutional: Alert, weak, cooperative patient in no acute distress; appears stated age Eyes:Sclera are clear. EOMs intact ENMT: no external lesions gross hearing normal; no obvious neck masses, no ear or lip lesions, nares normal 
CV: RRR. Normal perfusion Resp: No JVD. Breathing is  non-labored; no audible wheezing. Large hematoma of right anterior chest size of a azael No external bleeding GI: soft and non-distended Musculoskeletal: weak and deconditioned. No embolic signs or cyanosis. Right knee tender to palpation Neuro:  Oriented; moves all 4; no focal deficits Psychiatric: normal affect and mood, no memory impairment Recent vitals (if inpt): 
Patient Vitals for the past 24 hrs: 
 BP Temp Pulse Resp SpO2 Weight 20 1635 (!) 87/52    91 %   
20 1615 (!) 89/44    93 %   
20 1541 100/52 97.6 °F (36.4 °C) 76 16 97 % 330 lb (149.7 kg) Labs: 
Recent Labs  
  20 
1601 WBC 14.5* HGB 7.3*  
 reviewed and given to the patient    Note:  Patient was wearing a mask.  Writer was wearing gloves and a mask          
K 4.5  
 CO2 29 BUN 18 CREA 1.34  
GLU 77 TBILI 0.3 SGOT 20 ALT 11* AP 49* Lab Results Component Value Date/Time WBC 14.5 (H) 01/17/2020 04:01 PM  
 HGB 7.3 (L) 01/17/2020 04:01 PM  
 PLATELET 066 59/44/4672 04:01 PM  
 Sodium 138 01/17/2020 04:01 PM  
 Potassium 4.5 01/17/2020 04:01 PM  
 Chloride 107 01/17/2020 04:01 PM  
 CO2 29 01/17/2020 04:01 PM  
 BUN 18 01/17/2020 04:01 PM  
 Creatinine 1.34 01/17/2020 04:01 PM  
 Glucose 77 01/17/2020 04:01 PM  
 INR 1.0 07/14/2015 09:22 PM  
 aPTT 34.1 (H) 07/14/2015 09:22 PM  
 Bilirubin, total 0.3 01/17/2020 04:01 PM  
 AST (SGOT) 20 01/17/2020 04:01 PM  
 ALT (SGPT) 11 (L) 01/17/2020 04:01 PM  
 Alk. phosphatase 49 (L) 01/17/2020 04:01 PM  
 Troponin-I, Qt. <0.02 (L) 12/21/2019 10:05 PM  
 
 
CT Results  (Last 48 hours) None  
  
 
chest X-ray I reviewed recent labs, recent radiologic studies, and pertinent records including other doctor notes if needed. I independently reviewed radiology images for studies I described above or studies I have ordered. Admission date (for inpatients): 1/17/2020 * No surgery found *  * No surgery found * ASSESSMENT/PLAN: 
Problem List  Date Reviewed: 7/15/2019 Codes Class Noted * (Principal) Fall ICD-10-CM: W19. Meliza Hargrove ICD-9-CM: K653.2  1/17/2020 Hematoma of right chest wall ICD-10-CM: Z13.043M ICD-9-CM: 922.1  1/17/2020 Coagulopathy ([facility] [address] from Eliquis adn Indocin use ICD-10-CM: D68.9 ICD-9-CM: 286.9  1/17/2020 Acute pain of right knee ICD-10-CM: M25.561 ICD-9-CM: 719.46  1/17/2020 Debility ICD-10-CM: R53.81 ICD-9-CM: 799.3  1/17/2020 Fluid overload ICD-10-CM: E87.70 ICD-9-CM: 276.69  1/17/2020 Hypoglycemia ICD-10-CM: E16.2 ICD-9-CM: 251.2  12/22/2019 Syncope ICD-10-CM: R55 
ICD-9-CM: 780.2  12/22/2019 Morbid obesity ([facility] ICD-10-CM: E66.01 
ICD-9-CM: 278.01  Unknown Paroxysmal atrial fibrillation (HCC) ICD-10-CM: I48.0 ICD-9-CM: 427.31  Unknown Overview Signed 8/8/2016 11:37 AM by Aracely Solis  
  spontaneously converted from Afib to sinus with sotalol, no eCV HTN - uncontrolled, malignant ICD-10-CM: I10 
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 [address] ICD-10-CM: I50.32 
ICD-9-CM: 428.32  11/12/2015 Overview Signed 11/12/2015 10:24 AM by Lilian Rodriguez Echo 8/15:  EF 50-55%, mild MR Echo 3/14:  EF 50-55%, mild to mod MR and TR, RVSP 46 mmHg A/C DHF 3/2014 admission, before [facility] Malaise/fatigue/weakness/tiredness ICD-10-CM: J35.87 ICD-9-CM: 780.79  11/12/2015 Dyspnea/shortness of breath ICD-10-CM: R06.00 
ICD-9-CM: 786.09  11/12/2015 Post PTCA ICD-10-CM: D39.01 ICD-9-CM: V45.82  4/1/2014 Coronary atherosclerosis of native coronary artery ICD-10-CM: I25.10 ICD-9-CM: 414.01  4/1/2014 Overview Signed 11/12/2015 10:21 AM by Lilian Rodriguez [facility] 3/31/14: PCI of mid LAD with BMS, mild dz in other vessels Acute respiratory failure ([facility] [address] ICD-10-CM: J96.00 
ICD-9-CM: 518.81  3/28/2014 DM (diabetes mellitus) ([facility] [address] ICD-10-CM: E11.9 ICD-9-CM: 250.00  3/28/2014 Principal Problem: 
  Fall (1/17/2020) Active Problems: 
  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 [address] from Eliquis adn Indocin use (1/17/2020) Acute pain of right knee (1/17/2020) Debility (1/17/2020) Fluid overload (1/17/2020) Fall on anticoagulants (Eliquis and Indocin) with large right chest wall hematoma Debilitated patient with mult comorbidities Ask Hospitalist to admit Ice to chest 
I will follow Admit lab pending Serial Hgb 
check coags Coagulopathy secondary to Eliquis and NSAID use Hold Eliquis, NSAIS, ASA and all anticoagulants for now Debility/Fall Consult PT May need SNF Acute right knee pain after fall Xray with no fracture Pain meds prn I have personally performed a face-to-face diagnostic evaluation and management  service on this patient. I have independently seen the patient. I have independently obtained the above history from the patient/family. I have independently examined the patient with above findings. I have independently reviewed data/labs for this patient and developed the above plan of care (MDM). Signed: Thi Canales.  Chen Rodriguez MD, FACS

## 2024-05-13 NOTE — ED NOTES
Called patient and explained below results. No changes. Continue Lipitor 80mg daily. Reinforced smoking cessation and healthy diet for wt loss. Follow up scheduled for 5/13/2025 with Dr. Jose. Pt verbalizes understanding and has no further questions.     NM Stress Test (05/10/2024):  IMPRESSION:  1.   Myocardial perfusion scan does not show any reversible stress-induced ischemia.  Mild fixed defect in inferior septal wall probably represents diaphragmatic attenuation.  2.   Normal post-exercise left ventricular systolic function. LVEF:  54%  3. Cardiac Risk Assessment: Low  4. No previous study for comparison.    Stress test is negative for ischemia. No additional changes at this time. Continue Lipitor 80 mg daily. Continue with tobacco cessation and work on dietary modifications for gradual healthy weight loss.    Follow up with Dr. Jose in 1 year unless new issues arise in the meantime.    ANDRE Cervantes  Pager # [phone]  5/13/2024  8:39 AM     bgl recheck after snack was 58. Pt given d50 injection. Will recheck bgl in 15 minutes. Yes

## 2025-05-03 NOTE — PROGRESS NOTES
Patient admitted today after tripping and falling onto his rollator. Hit his right upper chest and developed a rapidly progressive hematoma to the area. Seen by surgery in the ER. Eliquis is held. Rec'd morphine and his BP dropped so he was sent to ICU rather than the floor. Hb at admission 7.3, repeat 6.7 (baseline probably 8-9). He was given tranexamic acid. Nurse notified me of pressures 60s/40s. Just finished his 3rd bolus of NS and currently being transfused. Now on NS maintenance at 125/hr. Patient evaluated at bedside and is A/O x3, no distress, RA O2 with /58, HR AV paced in the 70s. IV morphine held. He's not having much pain where his hematoma is. Monitor BPs tonight, con't serial H/H, con't maintenance fluids.  
 
Suze Manning MD 
 Surgery Progress Note  05/03/2025       Subjective:  No acute overnight events. Remains intubated, ventilated, and sedated. On levo 0.15, stable overnight . On insulin gtt. Drains remain in place. WBC downtrending from 26.7 to 18.1 (remains elevated). Hemoglobin stable at 8.5.      Objective:  Temp:  [98.4  F (36.9  C)-102.2  F (39  C)] 99.7  F (37.6  C)  Pulse:  [] 93  Resp:  [17-21] 19  MAP:  [59 mmHg-88 mmHg] 67 mmHg  Arterial Line BP: ()/(46-66) 108/52  FiO2 (%):  [35 %-45 %] 45 %  SpO2:  [92 %-100 %] 100 %    I/O last 3 completed shifts:  In: 3961.3 [I.V.:2156.85]  Out: 2836.3 [Urine:452; Drains:2356; Other:28.3]      Gen: Sedated, responsive to touch   Resp: Ventilated, trach  Abd: Rigid, tender to palpation diffusely. Abthera in place draining copious sanguinous output, SHANNON drains with stable sanguineous output in bulbs. Bulbs to suction. [place] drain with stool in bag.    Ext: WWP, no edema     Labs:  Recent Labs   Lab 05/03/25 0339 05/02/25 2359 05/02/25  1830 05/02/25  0703 05/02/25  0413 05/01/25  1322   WBC 18.1*  --   --   --  26.7* 16.2*   HGB 8.5*  8.5* 8.6* 9.0*   < > 9.8* 11.7*     --   --   --  169 123*    < > = values in this interval not displayed.       Recent Labs   Lab 05/03/25  0614 05/03/25 0339 05/03/25 0338 05/02/25 2359 05/02/25  2224 05/02/25  0418 05/02/25  0413   NA  --  136  136  --   --  136  --  136   POTASSIUM  --  4.2  4.2  --   --  4.6  --  5.1   CHLORIDE  --  105  105  --   --  105  --  105   CO2  --  18*  18*  --   --  17*  --  16*   BUN  --  86.7*  86.7*  --   --  101.0*  --  83.6*   CR  --  1.58*  1.58*  --   --  1.82*  --  2.08*   * 128*  128* 123*   < > 125*   < > 257*   KARINA  --  8.1*  8.1*  --   --  7.4*  --  7.3*   MAG  --  2.9*  --   --  1.9  --  2.0   PHOS  --  5.9*  --   --  7.6*  --  8.2*    < > = values in this interval not displayed.       [facility]    Impression    1.  Worsening sequela of necrotizing  pancreatitis. Dominant peripancreatic collection has increased in size, now measuring 19 x 13 cm, previously 17 x 11 cm; increasing gas within this collection is worrisome for infection. Large volume ascites and fat necrosis elsewhere throughout the abdomen and pelvis has also increased from prior.  2.  New presumed blood products in the dominant peripancreatic collection, as well as layering within the left paracolic gutter, suspicious for interval bleeding. Recommend trending hemoglobin levels; CTA abdomen and pelvis could be considered if there is concern for active bleeding.  3.  Mild small bowel wall thickening, possibly reactive or sequela of enteritis.  4.  Similar right lower lobe consolidation.     Assessment/Plan:   Sebastián Rodas is a 33-year-old male with a history of alcohol use disorder, anxiety, and bipolar disorder who was admitted on 3/30/2025 for alcohol withdrawal following a recent binge, presenting with diffuse abdominal pain. Initial workup revealed leukocytosis and elevated lipase concerning for acute pancreatitis. He developed acute encephalopathy and was transferred to the ICU on 3/31, requiring intubation and vasopressors by 4/1 due to shock. Hospital course has been complicated by acute renal failure requiring CRRT, cardiomyopathy (initial LVEF 20-25%, improved to 65-70% by 4/14), and necrotizing pancreatitis with unorganized fluid collections. He completed a 14-day course of meropenem but remains intermittently febrile and critically ill, requiring ongoing dialysis and vasopressor support. On 4/27, after clinical deterioration and imaging consistent with a likely perforated viscus, following family discussion, he underwent emergent exploratory laparotomy, necrosectomy, transverse colectomy, abdominal washout, and drain placement. Patient was transferred to [facility] on 4/30/25 for further surgical management. Went to OR 5/1 for re-open laparotomy, I&D, placement of colostomy tube in presumed  previous colectomy staple line, necrosectomy, and Abthera placement. On 5/2, increasing sanguineous output from drains concerning for bleeding from pancreatic bed. Family care conference held 5/2, plan for restorative cares at this time.     Plan:  - Tentative plan for takeback to OR 5/4, consent obtained for abdominal wash out, possible closure   - Other cares per SICU, we appreciate excellent cares.   - Surgery will continue to follow.     Seen, examined, and discussed with chief resident, who will discuss with staff.   - - - - - - - - - - - - - - - - - -  Vanita Saldana MD  General Surgery, PGY-1